# Patient Record
Sex: FEMALE | Race: WHITE | Employment: OTHER | URBAN - METROPOLITAN AREA
[De-identification: names, ages, dates, MRNs, and addresses within clinical notes are randomized per-mention and may not be internally consistent; named-entity substitution may affect disease eponyms.]

---

## 2018-05-30 ENCOUNTER — HOSPITAL ENCOUNTER (EMERGENCY)
Age: 63
Discharge: HOME | End: 2018-05-30
Attending: EMERGENCY MEDICINE
Payer: MEDICARE

## 2018-05-30 VITALS
SYSTOLIC BLOOD PRESSURE: 167 MMHG | DIASTOLIC BLOOD PRESSURE: 71 MMHG | OXYGEN SATURATION: 95 % | WEIGHT: 230 LBS | HEIGHT: 64 IN | TEMPERATURE: 99.1 F | BODY MASS INDEX: 39.27 KG/M2 | HEART RATE: 119 BPM

## 2018-05-30 DIAGNOSIS — F31.9 BIPOLAR 1 DISORDER (HCC): Primary | ICD-10-CM

## 2018-05-30 LAB
ALBUMIN SERPL-MCNC: 4.4 G/DL (ref 3.9–4.9)
ALP BLD-CCNC: 94 U/L (ref 40–130)
ALT SERPL-CCNC: 29 U/L (ref 0–33)
AMPHETAMINE SCREEN, URINE: NORMAL
ANION GAP SERPL CALCULATED.3IONS-SCNC: 16 MEQ/L (ref 7–13)
AST SERPL-CCNC: 26 U/L (ref 0–35)
BARBITURATE SCREEN URINE: NORMAL
BASOPHILS ABSOLUTE: 0.1 K/UL (ref 0–0.2)
BASOPHILS RELATIVE PERCENT: 0.6 %
BENZODIAZEPINE SCREEN, URINE: NORMAL
BILIRUB SERPL-MCNC: 0.4 MG/DL (ref 0–1.2)
BILIRUBIN URINE: NEGATIVE
BLOOD, URINE: NEGATIVE
BUN BLDV-MCNC: 4 MG/DL (ref 8–23)
CALCIUM SERPL-MCNC: 9 MG/DL (ref 8.6–10.2)
CANNABINOID SCREEN URINE: NORMAL
CHLORIDE BLD-SCNC: 95 MEQ/L (ref 98–107)
CLARITY: CLEAR
CO2: 24 MEQ/L (ref 22–29)
COCAINE METABOLITE SCREEN URINE: NORMAL
COLOR: YELLOW
CREAT SERPL-MCNC: 0.47 MG/DL (ref 0.5–0.9)
EOSINOPHILS ABSOLUTE: 0.1 K/UL (ref 0–0.7)
EOSINOPHILS RELATIVE PERCENT: 0.8 %
ETHANOL PERCENT: NORMAL G/DL
ETHANOL: <10 MG/DL (ref 0–0.08)
GFR AFRICAN AMERICAN: >60
GFR NON-AFRICAN AMERICAN: >60
GLOBULIN: 2.6 G/DL (ref 2.3–3.5)
GLUCOSE BLD-MCNC: 131 MG/DL (ref 74–109)
GLUCOSE URINE: NEGATIVE MG/DL
HCT VFR BLD CALC: 41.8 % (ref 37–47)
HEMOGLOBIN: 14.1 G/DL (ref 12–16)
KETONES, URINE: NEGATIVE MG/DL
LEUKOCYTE ESTERASE, URINE: NEGATIVE
LYMPHOCYTES ABSOLUTE: 3.3 K/UL (ref 1–4.8)
LYMPHOCYTES RELATIVE PERCENT: 27.1 %
Lab: NORMAL
MCH RBC QN AUTO: 29.2 PG (ref 27–31.3)
MCHC RBC AUTO-ENTMCNC: 33.8 % (ref 33–37)
MCV RBC AUTO: 86.5 FL (ref 82–100)
MONOCYTES ABSOLUTE: 0.7 K/UL (ref 0.2–0.8)
MONOCYTES RELATIVE PERCENT: 5.7 %
NEUTROPHILS ABSOLUTE: 8 K/UL (ref 1.4–6.5)
NEUTROPHILS RELATIVE PERCENT: 65.8 %
NITRITE, URINE: NEGATIVE
OPIATE SCREEN URINE: NORMAL
PDW BLD-RTO: 13.7 % (ref 11.5–14.5)
PH UA: 5.5 (ref 5–9)
PHENCYCLIDINE SCREEN URINE: NORMAL
PLATELET # BLD: 273 K/UL (ref 130–400)
POTASSIUM SERPL-SCNC: 3.7 MEQ/L (ref 3.5–5.1)
PROTEIN UA: NEGATIVE MG/DL
RBC # BLD: 4.84 M/UL (ref 4.2–5.4)
SODIUM BLD-SCNC: 135 MEQ/L (ref 132–144)
SPECIFIC GRAVITY UA: 1.01 (ref 1–1.03)
TOTAL CK: 128 U/L (ref 0–170)
TOTAL PROTEIN: 7 G/DL (ref 6.4–8.1)
TSH SERPL DL<=0.05 MIU/L-ACNC: 2.6 UIU/ML (ref 0.27–4.2)
URINE REFLEX TO CULTURE: NORMAL
UROBILINOGEN, URINE: 0.2 E.U./DL
WBC # BLD: 12.1 K/UL (ref 4.8–10.8)

## 2018-05-30 PROCEDURE — 80053 COMPREHEN METABOLIC PANEL: CPT

## 2018-05-30 PROCEDURE — 85025 COMPLETE CBC W/AUTO DIFF WBC: CPT

## 2018-05-30 PROCEDURE — 36415 COLL VENOUS BLD VENIPUNCTURE: CPT

## 2018-05-30 PROCEDURE — 6370000000 HC RX 637 (ALT 250 FOR IP): Performed by: EMERGENCY MEDICINE

## 2018-05-30 PROCEDURE — 99285 EMERGENCY DEPT VISIT HI MDM: CPT

## 2018-05-30 PROCEDURE — 81003 URINALYSIS AUTO W/O SCOPE: CPT

## 2018-05-30 PROCEDURE — 84443 ASSAY THYROID STIM HORMONE: CPT

## 2018-05-30 PROCEDURE — 82550 ASSAY OF CK (CPK): CPT

## 2018-05-30 PROCEDURE — G0480 DRUG TEST DEF 1-7 CLASSES: HCPCS

## 2018-05-30 PROCEDURE — 80307 DRUG TEST PRSMV CHEM ANLYZR: CPT

## 2018-05-30 RX ORDER — LORAZEPAM 1 MG/1
2 TABLET ORAL ONCE
Status: COMPLETED | OUTPATIENT
Start: 2018-05-30 | End: 2018-05-30

## 2018-05-30 RX ORDER — NICOTINE 21 MG/24HR
1 PATCH, TRANSDERMAL 24 HOURS TRANSDERMAL ONCE
Status: DISCONTINUED | OUTPATIENT
Start: 2018-05-30 | End: 2018-05-31 | Stop reason: HOSPADM

## 2018-05-30 RX ADMIN — LORAZEPAM 2 MG: 1 TABLET ORAL at 13:19

## 2018-05-30 ASSESSMENT — ENCOUNTER SYMPTOMS
RHINORRHEA: 0
PHOTOPHOBIA: 0
EYE DISCHARGE: 0
ABDOMINAL DISTENTION: 0
VOMITING: 0
SHORTNESS OF BREATH: 0
FACIAL SWELLING: 0
ABDOMINAL PAIN: 0
WHEEZING: 0
COLOR CHANGE: 0

## 2019-02-27 ENCOUNTER — HOSPITAL ENCOUNTER (INPATIENT)
Age: 64
LOS: 3 days | Discharge: HOME | DRG: 641 | End: 2019-03-03
Attending: EMERGENCY MEDICINE | Admitting: INTERNAL MEDICINE
Payer: MEDICARE

## 2019-02-27 DIAGNOSIS — R41.0 DELIRIUM: ICD-10-CM

## 2019-02-27 DIAGNOSIS — E87.1 HYPONATREMIA: Primary | ICD-10-CM

## 2019-02-27 LAB
ACETAMINOPHEN LEVEL: <5 UG/ML (ref 10–30)
ALBUMIN SERPL-MCNC: 4.5 G/DL (ref 3.5–4.6)
ALP BLD-CCNC: 108 U/L (ref 40–130)
ALT SERPL-CCNC: 27 U/L (ref 0–33)
AMPHETAMINE SCREEN, URINE: NORMAL
ANION GAP SERPL CALCULATED.3IONS-SCNC: 13 MEQ/L (ref 9–15)
ANION GAP SERPL CALCULATED.3IONS-SCNC: 14 MEQ/L (ref 9–15)
AST SERPL-CCNC: 23 U/L (ref 0–35)
BARBITURATE SCREEN URINE: NORMAL
BASOPHILS ABSOLUTE: 0.1 K/UL (ref 0–0.2)
BASOPHILS RELATIVE PERCENT: 0.8 %
BENZODIAZEPINE SCREEN, URINE: NORMAL
BILIRUB SERPL-MCNC: 0.6 MG/DL (ref 0.2–0.7)
BILIRUBIN URINE: NEGATIVE
BLOOD, URINE: NEGATIVE
BUN BLDV-MCNC: 10 MG/DL (ref 8–23)
BUN BLDV-MCNC: 8 MG/DL (ref 8–23)
CALCIUM SERPL-MCNC: 8.6 MG/DL (ref 8.5–9.9)
CALCIUM SERPL-MCNC: 9.7 MG/DL (ref 8.5–9.9)
CANNABINOID SCREEN URINE: NORMAL
CHLORIDE BLD-SCNC: 86 MEQ/L (ref 95–107)
CHLORIDE BLD-SCNC: 91 MEQ/L (ref 95–107)
CLARITY: CLEAR
CO2: 22 MEQ/L (ref 20–31)
CO2: 25 MEQ/L (ref 20–31)
COCAINE METABOLITE SCREEN URINE: NORMAL
COLOR: YELLOW
CREAT SERPL-MCNC: 0.66 MG/DL (ref 0.5–0.9)
CREAT SERPL-MCNC: 0.77 MG/DL (ref 0.5–0.9)
EOSINOPHILS ABSOLUTE: 0.1 K/UL (ref 0–0.7)
EOSINOPHILS RELATIVE PERCENT: 0.5 %
ETHANOL PERCENT: NORMAL G/DL
ETHANOL: <10 MG/DL (ref 0–0.08)
GFR AFRICAN AMERICAN: >60
GFR AFRICAN AMERICAN: >60
GFR NON-AFRICAN AMERICAN: >60
GFR NON-AFRICAN AMERICAN: >60
GLOBULIN: 3.3 G/DL (ref 2.3–3.5)
GLUCOSE BLD-MCNC: 139 MG/DL (ref 70–99)
GLUCOSE BLD-MCNC: 145 MG/DL (ref 70–99)
GLUCOSE URINE: NEGATIVE MG/DL
HCG(URINE) PREGNANCY TEST: NEGATIVE
HCT VFR BLD CALC: 36 % (ref 37–47)
HEMOGLOBIN: 12.5 G/DL (ref 12–16)
KETONES, URINE: NEGATIVE MG/DL
LEUKOCYTE ESTERASE, URINE: NEGATIVE
LYMPHOCYTES ABSOLUTE: 3.7 K/UL (ref 1–4.8)
LYMPHOCYTES RELATIVE PERCENT: 26.6 %
Lab: NORMAL
MCH RBC QN AUTO: 29.4 PG (ref 27–31.3)
MCHC RBC AUTO-ENTMCNC: 34.8 % (ref 33–37)
MCV RBC AUTO: 84.3 FL (ref 82–100)
MONOCYTES ABSOLUTE: 0.9 K/UL (ref 0.2–0.8)
MONOCYTES RELATIVE PERCENT: 6.6 %
NEUTROPHILS ABSOLUTE: 9.2 K/UL (ref 1.4–6.5)
NEUTROPHILS RELATIVE PERCENT: 65.5 %
NITRITE, URINE: NEGATIVE
OPIATE SCREEN URINE: NORMAL
PDW BLD-RTO: 13.7 % (ref 11.5–14.5)
PH UA: 5 (ref 5–9)
PHENCYCLIDINE SCREEN URINE: NORMAL
PLATELET # BLD: 336 K/UL (ref 130–400)
POTASSIUM SERPL-SCNC: 3.3 MEQ/L (ref 3.4–4.9)
POTASSIUM SERPL-SCNC: 3.6 MEQ/L (ref 3.4–4.9)
PROTEIN UA: NEGATIVE MG/DL
RBC # BLD: 4.27 M/UL (ref 4.2–5.4)
SALICYLATE, SERUM: <0.3 MG/DL (ref 15–30)
SODIUM BLD-SCNC: 125 MEQ/L (ref 135–144)
SODIUM BLD-SCNC: 126 MEQ/L (ref 135–144)
SPECIFIC GRAVITY UA: 1.01 (ref 1–1.03)
TOTAL CK: 134 U/L (ref 0–170)
TOTAL PROTEIN: 7.8 G/DL (ref 6.3–8)
TSH SERPL DL<=0.05 MIU/L-ACNC: 3.25 UIU/ML (ref 0.44–3.86)
URINE REFLEX TO CULTURE: NORMAL
UROBILINOGEN, URINE: 0.2 E.U./DL
WBC # BLD: 14 K/UL (ref 4.8–10.8)

## 2019-02-27 PROCEDURE — 81003 URINALYSIS AUTO W/O SCOPE: CPT

## 2019-02-27 PROCEDURE — G0480 DRUG TEST DEF 1-7 CLASSES: HCPCS

## 2019-02-27 PROCEDURE — 6360000002 HC RX W HCPCS: Performed by: PHYSICIAN ASSISTANT

## 2019-02-27 PROCEDURE — 96374 THER/PROPH/DIAG INJ IV PUSH: CPT

## 2019-02-27 PROCEDURE — 36415 COLL VENOUS BLD VENIPUNCTURE: CPT

## 2019-02-27 PROCEDURE — 2580000003 HC RX 258: Performed by: EMERGENCY MEDICINE

## 2019-02-27 PROCEDURE — 82550 ASSAY OF CK (CPK): CPT

## 2019-02-27 PROCEDURE — 99285 EMERGENCY DEPT VISIT HI MDM: CPT

## 2019-02-27 PROCEDURE — 2580000003 HC RX 258: Performed by: PHYSICIAN ASSISTANT

## 2019-02-27 PROCEDURE — 85025 COMPLETE CBC W/AUTO DIFF WBC: CPT

## 2019-02-27 PROCEDURE — 84703 CHORIONIC GONADOTROPIN ASSAY: CPT

## 2019-02-27 PROCEDURE — 84443 ASSAY THYROID STIM HORMONE: CPT

## 2019-02-27 PROCEDURE — 80053 COMPREHEN METABOLIC PANEL: CPT

## 2019-02-27 PROCEDURE — 80307 DRUG TEST PRSMV CHEM ANLYZR: CPT

## 2019-02-27 RX ORDER — SODIUM CHLORIDE 0.9 % (FLUSH) 0.9 %
3 SYRINGE (ML) INJECTION EVERY 8 HOURS
Status: DISCONTINUED | OUTPATIENT
Start: 2019-02-27 | End: 2019-02-28 | Stop reason: ALTCHOICE

## 2019-02-27 RX ORDER — OXCARBAZEPINE 300 MG/1
300 TABLET, FILM COATED ORAL 2 TIMES DAILY
Status: ON HOLD | COMMUNITY
End: 2019-03-07 | Stop reason: HOSPADM

## 2019-02-27 RX ORDER — LORAZEPAM 2 MG/ML
2 INJECTION INTRAMUSCULAR ONCE
Status: COMPLETED | OUTPATIENT
Start: 2019-02-27 | End: 2019-02-27

## 2019-02-27 RX ORDER — TRAZODONE HYDROCHLORIDE 50 MG/1
50 TABLET ORAL NIGHTLY
Status: ON HOLD | COMMUNITY
End: 2019-03-07 | Stop reason: HOSPADM

## 2019-02-27 RX ORDER — 0.9 % SODIUM CHLORIDE 0.9 %
1000 INTRAVENOUS SOLUTION INTRAVENOUS ONCE
Status: DISCONTINUED | OUTPATIENT
Start: 2019-02-27 | End: 2019-02-28

## 2019-02-27 RX ORDER — ARIPIPRAZOLE 20 MG/1
20 TABLET ORAL NIGHTLY
Status: ON HOLD | COMMUNITY
End: 2019-03-07 | Stop reason: SDUPTHER

## 2019-02-27 RX ORDER — 0.9 % SODIUM CHLORIDE 0.9 %
1500 INTRAVENOUS SOLUTION INTRAVENOUS ONCE
Status: COMPLETED | OUTPATIENT
Start: 2019-02-27 | End: 2019-02-27

## 2019-02-27 RX ADMIN — SODIUM CHLORIDE 1500 ML: 9 INJECTION, SOLUTION INTRAVENOUS at 20:31

## 2019-02-27 RX ADMIN — LORAZEPAM 2 MG: 2 INJECTION INTRAMUSCULAR; INTRAVENOUS at 22:26

## 2019-02-27 RX ADMIN — SODIUM CHLORIDE 1000 ML: 9 INJECTION, SOLUTION INTRAVENOUS at 23:48

## 2019-02-27 RX ADMIN — Medication 3 ML: at 20:33

## 2019-02-27 ASSESSMENT — ENCOUNTER SYMPTOMS
COUGH: 0
VOMITING: 0
SORE THROAT: 0
DIARRHEA: 0
SHORTNESS OF BREATH: 0
BACK PAIN: 0
NAUSEA: 0
ABDOMINAL PAIN: 0

## 2019-02-28 PROBLEM — E87.1 HYPONATREMIA: Status: ACTIVE | Noted: 2019-02-28

## 2019-02-28 LAB
ANION GAP SERPL CALCULATED.3IONS-SCNC: 12 MEQ/L (ref 9–15)
ANION GAP SERPL CALCULATED.3IONS-SCNC: 14 MEQ/L (ref 9–15)
BUN BLDV-MCNC: 7 MG/DL (ref 8–23)
BUN BLDV-MCNC: 8 MG/DL (ref 8–23)
C DIFFICILE TOXIN, EIA: NORMAL
CALCIUM SERPL-MCNC: 8.7 MG/DL (ref 8.5–9.9)
CALCIUM SERPL-MCNC: 8.9 MG/DL (ref 8.5–9.9)
CHLORIDE BLD-SCNC: 94 MEQ/L (ref 95–107)
CHLORIDE BLD-SCNC: 99 MEQ/L (ref 95–107)
CO2: 23 MEQ/L (ref 20–31)
CO2: 24 MEQ/L (ref 20–31)
CREAT SERPL-MCNC: 0.51 MG/DL (ref 0.5–0.9)
CREAT SERPL-MCNC: 0.55 MG/DL (ref 0.5–0.9)
CREATININE URINE: 219.6 MG/DL
GFR AFRICAN AMERICAN: >60
GFR AFRICAN AMERICAN: >60
GFR NON-AFRICAN AMERICAN: >60
GFR NON-AFRICAN AMERICAN: >60
GLUCOSE BLD-MCNC: 110 MG/DL (ref 70–99)
GLUCOSE BLD-MCNC: 132 MG/DL (ref 70–99)
POTASSIUM REFLEX MAGNESIUM: 3.8 MEQ/L (ref 3.4–4.9)
POTASSIUM REFLEX MAGNESIUM: 4.1 MEQ/L (ref 3.4–4.9)
RAPID INFLUENZA  B AGN: NEGATIVE
RAPID INFLUENZA A AGN: NEGATIVE
SODIUM BLD-SCNC: 131 MEQ/L (ref 135–144)
SODIUM BLD-SCNC: 135 MEQ/L (ref 135–144)
SODIUM URINE: <20 MEQ/L
UREA NITROGEN, UR: 834 MG/DL

## 2019-02-28 PROCEDURE — 6360000002 HC RX W HCPCS: Performed by: NURSE PRACTITIONER

## 2019-02-28 PROCEDURE — 84540 ASSAY OF URINE/UREA-N: CPT

## 2019-02-28 PROCEDURE — 6370000000 HC RX 637 (ALT 250 FOR IP): Performed by: NURSE PRACTITIONER

## 2019-02-28 PROCEDURE — 82570 ASSAY OF URINE CREATININE: CPT

## 2019-02-28 PROCEDURE — 87324 CLOSTRIDIUM AG IA: CPT

## 2019-02-28 PROCEDURE — 6370000000 HC RX 637 (ALT 250 FOR IP): Performed by: INTERNAL MEDICINE

## 2019-02-28 PROCEDURE — 84300 ASSAY OF URINE SODIUM: CPT

## 2019-02-28 PROCEDURE — 83935 ASSAY OF URINE OSMOLALITY: CPT

## 2019-02-28 PROCEDURE — 87449 NOS EACH ORGANISM AG IA: CPT

## 2019-02-28 PROCEDURE — 2580000003 HC RX 258: Performed by: NURSE PRACTITIONER

## 2019-02-28 PROCEDURE — 36415 COLL VENOUS BLD VENIPUNCTURE: CPT

## 2019-02-28 PROCEDURE — 1210000000 HC MED SURG R&B

## 2019-02-28 PROCEDURE — 81003 URINALYSIS AUTO W/O SCOPE: CPT

## 2019-02-28 PROCEDURE — 80048 BASIC METABOLIC PNL TOTAL CA: CPT

## 2019-02-28 PROCEDURE — 99222 1ST HOSP IP/OBS MODERATE 55: CPT | Performed by: PSYCHIATRY & NEUROLOGY

## 2019-02-28 PROCEDURE — 87804 INFLUENZA ASSAY W/OPTIC: CPT

## 2019-02-28 RX ORDER — SODIUM CHLORIDE 0.9 % (FLUSH) 0.9 %
10 SYRINGE (ML) INJECTION PRN
Status: DISCONTINUED | OUTPATIENT
Start: 2019-02-28 | End: 2019-03-03 | Stop reason: HOSPADM

## 2019-02-28 RX ORDER — LOPERAMIDE HYDROCHLORIDE 2 MG/1
2 CAPSULE ORAL 4 TIMES DAILY PRN
Status: DISCONTINUED | OUTPATIENT
Start: 2019-02-28 | End: 2019-03-03 | Stop reason: HOSPADM

## 2019-02-28 RX ORDER — ONDANSETRON 2 MG/ML
4 INJECTION INTRAMUSCULAR; INTRAVENOUS EVERY 6 HOURS PRN
Status: DISCONTINUED | OUTPATIENT
Start: 2019-02-28 | End: 2019-03-03 | Stop reason: HOSPADM

## 2019-02-28 RX ORDER — OXCARBAZEPINE 300 MG/1
300 TABLET, FILM COATED ORAL 2 TIMES DAILY
Status: DISCONTINUED | OUTPATIENT
Start: 2019-02-28 | End: 2019-03-03 | Stop reason: HOSPADM

## 2019-02-28 RX ORDER — CETIRIZINE HYDROCHLORIDE 10 MG/1
10 TABLET ORAL DAILY
Status: DISCONTINUED | OUTPATIENT
Start: 2019-02-28 | End: 2019-03-03 | Stop reason: HOSPADM

## 2019-02-28 RX ORDER — ACETAMINOPHEN 325 MG/1
650 TABLET ORAL EVERY 4 HOURS PRN
Status: DISCONTINUED | OUTPATIENT
Start: 2019-02-28 | End: 2019-03-03 | Stop reason: HOSPADM

## 2019-02-28 RX ORDER — CITALOPRAM 20 MG/1
20 TABLET ORAL DAILY
Status: DISCONTINUED | OUTPATIENT
Start: 2019-02-28 | End: 2019-03-03 | Stop reason: HOSPADM

## 2019-02-28 RX ORDER — NICOTINE 21 MG/24HR
1 PATCH, TRANSDERMAL 24 HOURS TRANSDERMAL DAILY
Status: DISCONTINUED | OUTPATIENT
Start: 2019-02-28 | End: 2019-03-03 | Stop reason: HOSPADM

## 2019-02-28 RX ORDER — FLUTICASONE PROPIONATE 50 MCG
2 SPRAY, SUSPENSION (ML) NASAL DAILY
Status: DISCONTINUED | OUTPATIENT
Start: 2019-02-28 | End: 2019-03-03 | Stop reason: HOSPADM

## 2019-02-28 RX ORDER — ARIPIPRAZOLE 10 MG/1
20 TABLET ORAL NIGHTLY
Status: DISCONTINUED | OUTPATIENT
Start: 2019-02-28 | End: 2019-03-03 | Stop reason: HOSPADM

## 2019-02-28 RX ORDER — TRAZODONE HYDROCHLORIDE 50 MG/1
50 TABLET ORAL NIGHTLY
Status: DISCONTINUED | OUTPATIENT
Start: 2019-02-28 | End: 2019-03-03 | Stop reason: HOSPADM

## 2019-02-28 RX ORDER — SODIUM CHLORIDE 0.9 % (FLUSH) 0.9 %
10 SYRINGE (ML) INJECTION EVERY 12 HOURS SCHEDULED
Status: DISCONTINUED | OUTPATIENT
Start: 2019-02-28 | End: 2019-03-03 | Stop reason: HOSPADM

## 2019-02-28 RX ADMIN — ACETAMINOPHEN 650 MG: 325 TABLET ORAL at 15:20

## 2019-02-28 RX ADMIN — NICOTINE POLACRILEX 2 MG: 2 GUM, CHEWING BUCCAL at 15:07

## 2019-02-28 RX ADMIN — LOPERAMIDE HYDROCHLORIDE 2 MG: 2 CAPSULE ORAL at 11:45

## 2019-02-28 RX ADMIN — CITALOPRAM HYDROBROMIDE 20 MG: 20 TABLET ORAL at 15:07

## 2019-02-28 RX ADMIN — Medication 10 ML: at 09:49

## 2019-02-28 RX ADMIN — TRAZODONE HYDROCHLORIDE 50 MG: 50 TABLET ORAL at 20:49

## 2019-02-28 RX ADMIN — ENOXAPARIN SODIUM 40 MG: 40 INJECTION SUBCUTANEOUS at 08:38

## 2019-02-28 RX ADMIN — LOPERAMIDE HYDROCHLORIDE 2 MG: 2 CAPSULE ORAL at 09:43

## 2019-02-28 RX ADMIN — ACETAMINOPHEN 650 MG: 325 TABLET ORAL at 06:50

## 2019-02-28 RX ADMIN — NICOTINE POLACRILEX 2 MG: 2 GUM, CHEWING BUCCAL at 17:40

## 2019-02-28 RX ADMIN — LOPERAMIDE HYDROCHLORIDE 2 MG: 2 CAPSULE ORAL at 18:58

## 2019-02-28 RX ADMIN — CETIRIZINE HYDROCHLORIDE 10 MG: 10 TABLET, FILM COATED ORAL at 16:09

## 2019-02-28 RX ADMIN — ONDANSETRON 4 MG: 2 INJECTION INTRAMUSCULAR; INTRAVENOUS at 14:22

## 2019-02-28 RX ADMIN — FLUTICASONE PROPIONATE 2 SPRAY: 50 SPRAY, METERED NASAL at 16:09

## 2019-02-28 RX ADMIN — ARIPIPRAZOLE 20 MG: 10 TABLET ORAL at 20:49

## 2019-02-28 RX ADMIN — ACETAMINOPHEN 650 MG: 325 TABLET ORAL at 09:17

## 2019-02-28 RX ADMIN — Medication 10 ML: at 20:50

## 2019-02-28 RX ADMIN — OXCARBAZEPINE 300 MG: 300 TABLET, FILM COATED ORAL at 20:49

## 2019-02-28 ASSESSMENT — PAIN DESCRIPTION - ORIENTATION: ORIENTATION: MID

## 2019-02-28 ASSESSMENT — PAIN SCALES - GENERAL
PAINLEVEL_OUTOF10: 5
PAINLEVEL_OUTOF10: 3
PAINLEVEL_OUTOF10: 5
PAINLEVEL_OUTOF10: 0

## 2019-02-28 ASSESSMENT — PAIN DESCRIPTION - PAIN TYPE: TYPE: CHRONIC PAIN

## 2019-02-28 ASSESSMENT — PAIN DESCRIPTION - DESCRIPTORS: DESCRIPTORS: ACHING

## 2019-02-28 ASSESSMENT — PAIN DESCRIPTION - FREQUENCY: FREQUENCY: INTERMITTENT

## 2019-02-28 ASSESSMENT — PAIN DESCRIPTION - LOCATION: LOCATION: BACK

## 2019-03-01 ENCOUNTER — APPOINTMENT (OUTPATIENT)
Dept: GENERAL RADIOLOGY | Age: 64
DRG: 641 | End: 2019-03-01
Payer: MEDICARE

## 2019-03-01 LAB
ALBUMIN SERPL-MCNC: 3.7 G/DL (ref 3.5–4.6)
ALP BLD-CCNC: 81 U/L (ref 40–130)
ALT SERPL-CCNC: 23 U/L (ref 0–33)
ANION GAP SERPL CALCULATED.3IONS-SCNC: 11 MEQ/L (ref 9–15)
AST SERPL-CCNC: 24 U/L (ref 0–35)
BACTERIA: ABNORMAL /HPF
BILIRUB SERPL-MCNC: <0.2 MG/DL (ref 0.2–0.7)
BILIRUBIN DIRECT: <0.2 MG/DL (ref 0–0.4)
BILIRUBIN URINE: NEGATIVE
BILIRUBIN, INDIRECT: NORMAL MG/DL (ref 0–0.6)
BLOOD, URINE: NEGATIVE
BUN BLDV-MCNC: 6 MG/DL (ref 8–23)
CALCIUM SERPL-MCNC: 8.7 MG/DL (ref 8.5–9.9)
CHLORIDE BLD-SCNC: 97 MEQ/L (ref 95–107)
CLARITY: ABNORMAL
CO2: 27 MEQ/L (ref 20–31)
COLOR: ABNORMAL
CREAT SERPL-MCNC: 0.54 MG/DL (ref 0.5–0.9)
EPITHELIAL CELLS, UA: ABNORMAL /HPF (ref 0–5)
GFR AFRICAN AMERICAN: >60
GFR NON-AFRICAN AMERICAN: >60
GLUCOSE BLD-MCNC: 124 MG/DL (ref 70–99)
GLUCOSE URINE: NEGATIVE MG/DL
HCT VFR BLD CALC: 31.6 % (ref 37–47)
HEMOGLOBIN: 10.8 G/DL (ref 12–16)
HYALINE CASTS: ABNORMAL /HPF (ref 0–5)
KETONES, URINE: ABNORMAL MG/DL
LEUKOCYTE ESTERASE, URINE: NEGATIVE
MCH RBC QN AUTO: 29.4 PG (ref 27–31.3)
MCHC RBC AUTO-ENTMCNC: 34 % (ref 33–37)
MCV RBC AUTO: 86.5 FL (ref 82–100)
NITRITE, URINE: NEGATIVE
OSMOLALITY URINE: 596 MOSM/KG (ref 300–900)
PDW BLD-RTO: 13.7 % (ref 11.5–14.5)
PH UA: 5.5 (ref 5–9)
PLATELET # BLD: 269 K/UL (ref 130–400)
POTASSIUM REFLEX MAGNESIUM: 4.4 MEQ/L (ref 3.4–4.9)
PROCALCITONIN: 0.06 NG/ML (ref 0–0.15)
PROTEIN UA: 30 MG/DL
RBC # BLD: 3.66 M/UL (ref 4.2–5.4)
SODIUM BLD-SCNC: 135 MEQ/L (ref 135–144)
SPECIFIC GRAVITY UA: 1.02 (ref 1–1.03)
SPECIFIC GRAVITY UA: 1.04 (ref 1–1.03)
TOTAL PROTEIN: 6.3 G/DL (ref 6.3–8)
URINE REFLEX TO CULTURE: YES
UROBILINOGEN, URINE: 1 E.U./DL
WBC # BLD: 8.5 K/UL (ref 4.8–10.8)
WBC UA: ABNORMAL /HPF (ref 0–5)

## 2019-03-01 PROCEDURE — 87040 BLOOD CULTURE FOR BACTERIA: CPT

## 2019-03-01 PROCEDURE — 6370000000 HC RX 637 (ALT 250 FOR IP): Performed by: NURSE PRACTITIONER

## 2019-03-01 PROCEDURE — 85027 COMPLETE CBC AUTOMATED: CPT

## 2019-03-01 PROCEDURE — 80076 HEPATIC FUNCTION PANEL: CPT

## 2019-03-01 PROCEDURE — 81001 URINALYSIS AUTO W/SCOPE: CPT

## 2019-03-01 PROCEDURE — 93010 ELECTROCARDIOGRAM REPORT: CPT | Performed by: INTERNAL MEDICINE

## 2019-03-01 PROCEDURE — 87077 CULTURE AEROBIC IDENTIFY: CPT

## 2019-03-01 PROCEDURE — 36415 COLL VENOUS BLD VENIPUNCTURE: CPT

## 2019-03-01 PROCEDURE — 93005 ELECTROCARDIOGRAM TRACING: CPT

## 2019-03-01 PROCEDURE — 87186 SC STD MICRODIL/AGAR DIL: CPT

## 2019-03-01 PROCEDURE — 87086 URINE CULTURE/COLONY COUNT: CPT

## 2019-03-01 PROCEDURE — 71045 X-RAY EXAM CHEST 1 VIEW: CPT

## 2019-03-01 PROCEDURE — 2580000003 HC RX 258: Performed by: NURSE PRACTITIONER

## 2019-03-01 PROCEDURE — 84145 PROCALCITONIN (PCT): CPT

## 2019-03-01 PROCEDURE — 6370000000 HC RX 637 (ALT 250 FOR IP): Performed by: INTERNAL MEDICINE

## 2019-03-01 PROCEDURE — 6360000002 HC RX W HCPCS: Performed by: NURSE PRACTITIONER

## 2019-03-01 PROCEDURE — 1210000000 HC MED SURG R&B

## 2019-03-01 PROCEDURE — 80048 BASIC METABOLIC PNL TOTAL CA: CPT

## 2019-03-01 RX ORDER — AZITHROMYCIN 250 MG/1
250 TABLET, FILM COATED ORAL DAILY
Status: DISCONTINUED | OUTPATIENT
Start: 2019-03-02 | End: 2019-03-03

## 2019-03-01 RX ORDER — AZITHROMYCIN 500 MG/1
500 TABLET, FILM COATED ORAL ONCE
Status: COMPLETED | OUTPATIENT
Start: 2019-03-01 | End: 2019-03-01

## 2019-03-01 RX ADMIN — ARIPIPRAZOLE 20 MG: 10 TABLET ORAL at 20:39

## 2019-03-01 RX ADMIN — CITALOPRAM HYDROBROMIDE 20 MG: 20 TABLET ORAL at 09:11

## 2019-03-01 RX ADMIN — TRAZODONE HYDROCHLORIDE 50 MG: 50 TABLET ORAL at 20:39

## 2019-03-01 RX ADMIN — ACETAMINOPHEN 650 MG: 325 TABLET ORAL at 18:10

## 2019-03-01 RX ADMIN — Medication 10 ML: at 20:40

## 2019-03-01 RX ADMIN — ACETAMINOPHEN 650 MG: 325 TABLET ORAL at 05:22

## 2019-03-01 RX ADMIN — Medication 10 ML: at 10:24

## 2019-03-01 RX ADMIN — AZITHROMYCIN MONOHYDRATE 500 MG: 500 TABLET ORAL at 14:20

## 2019-03-01 RX ADMIN — CETIRIZINE HYDROCHLORIDE 10 MG: 10 TABLET, FILM COATED ORAL at 09:11

## 2019-03-01 RX ADMIN — FLUTICASONE PROPIONATE 2 SPRAY: 50 SPRAY, METERED NASAL at 09:11

## 2019-03-01 RX ADMIN — NICOTINE POLACRILEX 2 MG: 2 GUM, CHEWING BUCCAL at 14:24

## 2019-03-01 RX ADMIN — OXCARBAZEPINE 300 MG: 300 TABLET, FILM COATED ORAL at 09:11

## 2019-03-01 RX ADMIN — NICOTINE POLACRILEX 2 MG: 2 GUM, CHEWING BUCCAL at 20:39

## 2019-03-01 RX ADMIN — ACETAMINOPHEN 650 MG: 325 TABLET ORAL at 10:23

## 2019-03-01 RX ADMIN — ENOXAPARIN SODIUM 40 MG: 40 INJECTION SUBCUTANEOUS at 09:14

## 2019-03-01 RX ADMIN — OXCARBAZEPINE 300 MG: 300 TABLET, FILM COATED ORAL at 20:39

## 2019-03-01 ASSESSMENT — PAIN SCALES - GENERAL
PAINLEVEL_OUTOF10: 10
PAINLEVEL_OUTOF10: 8
PAINLEVEL_OUTOF10: 0

## 2019-03-02 LAB
ANION GAP SERPL CALCULATED.3IONS-SCNC: 12 MEQ/L (ref 9–15)
BASOPHILS ABSOLUTE: 0.1 K/UL (ref 0–0.2)
BASOPHILS RELATIVE PERCENT: 0.9 %
BUN BLDV-MCNC: 6 MG/DL (ref 8–23)
CALCIUM SERPL-MCNC: 8.1 MG/DL (ref 8.5–9.9)
CHLORIDE BLD-SCNC: 97 MEQ/L (ref 95–107)
CO2: 26 MEQ/L (ref 20–31)
CREAT SERPL-MCNC: 0.5 MG/DL (ref 0.5–0.9)
EOSINOPHILS ABSOLUTE: 0.1 K/UL (ref 0–0.7)
EOSINOPHILS RELATIVE PERCENT: 1.2 %
GFR AFRICAN AMERICAN: >60
GFR NON-AFRICAN AMERICAN: >60
GLUCOSE BLD-MCNC: 120 MG/DL (ref 70–99)
HCT VFR BLD CALC: 29.5 % (ref 37–47)
HEMOGLOBIN: 10 G/DL (ref 12–16)
LYMPHOCYTES ABSOLUTE: 3.1 K/UL (ref 1–4.8)
LYMPHOCYTES RELATIVE PERCENT: 37.7 %
MAGNESIUM: 1.6 MG/DL (ref 1.7–2.4)
MCH RBC QN AUTO: 29.1 PG (ref 27–31.3)
MCHC RBC AUTO-ENTMCNC: 33.8 % (ref 33–37)
MCV RBC AUTO: 86 FL (ref 82–100)
MONOCYTES ABSOLUTE: 0.6 K/UL (ref 0.2–0.8)
MONOCYTES RELATIVE PERCENT: 7.7 %
NEUTROPHILS ABSOLUTE: 4.4 K/UL (ref 1.4–6.5)
NEUTROPHILS RELATIVE PERCENT: 52.5 %
PDW BLD-RTO: 13.5 % (ref 11.5–14.5)
PLATELET # BLD: 267 K/UL (ref 130–400)
POTASSIUM REFLEX MAGNESIUM: 3.7 MEQ/L (ref 3.4–4.9)
RBC # BLD: 3.42 M/UL (ref 4.2–5.4)
SODIUM BLD-SCNC: 135 MEQ/L (ref 135–144)
WBC # BLD: 8.3 K/UL (ref 4.8–10.8)

## 2019-03-02 PROCEDURE — 6360000002 HC RX W HCPCS: Performed by: NURSE PRACTITIONER

## 2019-03-02 PROCEDURE — 1210000000 HC MED SURG R&B

## 2019-03-02 PROCEDURE — 83735 ASSAY OF MAGNESIUM: CPT

## 2019-03-02 PROCEDURE — 6370000000 HC RX 637 (ALT 250 FOR IP): Performed by: INTERNAL MEDICINE

## 2019-03-02 PROCEDURE — 99233 SBSQ HOSP IP/OBS HIGH 50: CPT | Performed by: INTERNAL MEDICINE

## 2019-03-02 PROCEDURE — 6370000000 HC RX 637 (ALT 250 FOR IP): Performed by: NURSE PRACTITIONER

## 2019-03-02 PROCEDURE — 36415 COLL VENOUS BLD VENIPUNCTURE: CPT

## 2019-03-02 PROCEDURE — 93005 ELECTROCARDIOGRAM TRACING: CPT

## 2019-03-02 PROCEDURE — 2580000003 HC RX 258: Performed by: NURSE PRACTITIONER

## 2019-03-02 PROCEDURE — 85025 COMPLETE CBC W/AUTO DIFF WBC: CPT

## 2019-03-02 PROCEDURE — 80048 BASIC METABOLIC PNL TOTAL CA: CPT

## 2019-03-02 PROCEDURE — 6360000002 HC RX W HCPCS: Performed by: INTERNAL MEDICINE

## 2019-03-02 RX ORDER — OXCARBAZEPINE 300 MG/1
300 TABLET, FILM COATED ORAL 2 TIMES DAILY
Status: CANCELLED | OUTPATIENT
Start: 2019-03-02

## 2019-03-02 RX ORDER — CETIRIZINE HYDROCHLORIDE 10 MG/1
10 TABLET ORAL DAILY
Status: CANCELLED | OUTPATIENT
Start: 2019-03-03

## 2019-03-02 RX ORDER — AZITHROMYCIN 250 MG/1
250 TABLET, FILM COATED ORAL DAILY
Status: CANCELLED | OUTPATIENT
Start: 2019-03-03 | End: 2019-03-06

## 2019-03-02 RX ORDER — ONDANSETRON 2 MG/ML
4 INJECTION INTRAMUSCULAR; INTRAVENOUS EVERY 6 HOURS PRN
Status: CANCELLED | OUTPATIENT
Start: 2019-03-02

## 2019-03-02 RX ORDER — ACETAMINOPHEN 325 MG/1
650 TABLET ORAL EVERY 4 HOURS PRN
Status: CANCELLED | OUTPATIENT
Start: 2019-03-02

## 2019-03-02 RX ORDER — LOPERAMIDE HYDROCHLORIDE 2 MG/1
2 CAPSULE ORAL 4 TIMES DAILY PRN
Status: CANCELLED | OUTPATIENT
Start: 2019-03-02

## 2019-03-02 RX ORDER — POTASSIUM CHLORIDE 20 MEQ/1
40 TABLET, EXTENDED RELEASE ORAL ONCE
Status: COMPLETED | OUTPATIENT
Start: 2019-03-02 | End: 2019-03-02

## 2019-03-02 RX ORDER — MAGNESIUM SULFATE IN WATER 40 MG/ML
4 INJECTION, SOLUTION INTRAVENOUS ONCE
Status: COMPLETED | OUTPATIENT
Start: 2019-03-02 | End: 2019-03-02

## 2019-03-02 RX ORDER — FLUTICASONE PROPIONATE 50 MCG
2 SPRAY, SUSPENSION (ML) NASAL DAILY
Status: CANCELLED | OUTPATIENT
Start: 2019-03-03

## 2019-03-02 RX ORDER — TRAZODONE HYDROCHLORIDE 50 MG/1
50 TABLET ORAL NIGHTLY
Status: CANCELLED | OUTPATIENT
Start: 2019-03-02

## 2019-03-02 RX ORDER — ARIPIPRAZOLE 10 MG/1
20 TABLET ORAL NIGHTLY
Status: CANCELLED | OUTPATIENT
Start: 2019-03-02

## 2019-03-02 RX ORDER — CITALOPRAM 20 MG/1
20 TABLET ORAL DAILY
Status: CANCELLED | OUTPATIENT
Start: 2019-03-03

## 2019-03-02 RX ADMIN — OXCARBAZEPINE 300 MG: 300 TABLET, FILM COATED ORAL at 21:25

## 2019-03-02 RX ADMIN — TRAZODONE HYDROCHLORIDE 50 MG: 50 TABLET ORAL at 21:25

## 2019-03-02 RX ADMIN — AZITHROMYCIN 250 MG: 250 TABLET, FILM COATED ORAL at 07:33

## 2019-03-02 RX ADMIN — OXCARBAZEPINE 300 MG: 300 TABLET, FILM COATED ORAL at 07:33

## 2019-03-02 RX ADMIN — Medication 10 ML: at 21:25

## 2019-03-02 RX ADMIN — ENOXAPARIN SODIUM 40 MG: 40 INJECTION SUBCUTANEOUS at 07:33

## 2019-03-02 RX ADMIN — NICOTINE POLACRILEX 2 MG: 2 GUM, CHEWING BUCCAL at 16:37

## 2019-03-02 RX ADMIN — CITALOPRAM HYDROBROMIDE 20 MG: 20 TABLET ORAL at 07:33

## 2019-03-02 RX ADMIN — MAGNESIUM SULFATE HEPTAHYDRATE 4 G: 40 INJECTION, SOLUTION INTRAVENOUS at 15:03

## 2019-03-02 RX ADMIN — ARIPIPRAZOLE 20 MG: 10 TABLET ORAL at 21:24

## 2019-03-02 RX ADMIN — NICOTINE POLACRILEX 2 MG: 2 GUM, CHEWING BUCCAL at 00:15

## 2019-03-02 RX ADMIN — METOPROLOL TARTRATE 25 MG: 25 TABLET ORAL at 21:25

## 2019-03-02 RX ADMIN — Medication 10 ML: at 07:34

## 2019-03-02 RX ADMIN — MELATONIN TAB 3 MG 5 MG: 3 TAB at 23:51

## 2019-03-02 RX ADMIN — POTASSIUM CHLORIDE 40 MEQ: 20 TABLET, EXTENDED RELEASE ORAL at 11:09

## 2019-03-02 RX ADMIN — CETIRIZINE HYDROCHLORIDE 10 MG: 10 TABLET, FILM COATED ORAL at 07:33

## 2019-03-02 RX ADMIN — FLUTICASONE PROPIONATE 2 SPRAY: 50 SPRAY, METERED NASAL at 07:25

## 2019-03-02 ASSESSMENT — PAIN SCALES - GENERAL
PAINLEVEL_OUTOF10: 0

## 2019-03-03 ENCOUNTER — HOSPITAL ENCOUNTER (INPATIENT)
Age: 64
LOS: 4 days | Discharge: HOME | DRG: 885 | End: 2019-03-07
Attending: PSYCHIATRY & NEUROLOGY | Admitting: PSYCHIATRY & NEUROLOGY
Payer: MEDICARE

## 2019-03-03 VITALS
HEART RATE: 91 BPM | SYSTOLIC BLOOD PRESSURE: 155 MMHG | WEIGHT: 230 LBS | BODY MASS INDEX: 39.27 KG/M2 | TEMPERATURE: 97.9 F | RESPIRATION RATE: 18 BRPM | HEIGHT: 64 IN | OXYGEN SATURATION: 98 % | DIASTOLIC BLOOD PRESSURE: 68 MMHG

## 2019-03-03 PROBLEM — F31.9 BIPOLAR DISORDER (HCC): Status: ACTIVE | Noted: 2019-03-03

## 2019-03-03 LAB
ANION GAP SERPL CALCULATED.3IONS-SCNC: 12 MEQ/L (ref 9–15)
BUN BLDV-MCNC: 6 MG/DL (ref 8–23)
CALCIUM SERPL-MCNC: 8.8 MG/DL (ref 8.5–9.9)
CHLORIDE BLD-SCNC: 93 MEQ/L (ref 95–107)
CO2: 26 MEQ/L (ref 20–31)
CREAT SERPL-MCNC: 0.49 MG/DL (ref 0.5–0.9)
EKG ATRIAL RATE: 129 BPM
EKG ATRIAL RATE: 72 BPM
EKG ATRIAL RATE: 81 BPM
EKG P AXIS: 49 DEGREES
EKG P AXIS: 55 DEGREES
EKG P AXIS: 58 DEGREES
EKG P-R INTERVAL: 134 MS
EKG P-R INTERVAL: 150 MS
EKG P-R INTERVAL: 176 MS
EKG Q-T INTERVAL: 298 MS
EKG Q-T INTERVAL: 390 MS
EKG Q-T INTERVAL: 412 MS
EKG QRS DURATION: 82 MS
EKG QRS DURATION: 88 MS
EKG QRS DURATION: 88 MS
EKG QTC CALCULATION (BAZETT): 436 MS
EKG QTC CALCULATION (BAZETT): 451 MS
EKG QTC CALCULATION (BAZETT): 453 MS
EKG R AXIS: -10 DEGREES
EKG R AXIS: 17 DEGREES
EKG R AXIS: 8 DEGREES
EKG T AXIS: 35 DEGREES
EKG T AXIS: 49 DEGREES
EKG T AXIS: 68 DEGREES
EKG VENTRICULAR RATE: 129 BPM
EKG VENTRICULAR RATE: 72 BPM
EKG VENTRICULAR RATE: 81 BPM
GFR AFRICAN AMERICAN: >60
GFR NON-AFRICAN AMERICAN: >60
GLUCOSE BLD-MCNC: 132 MG/DL (ref 70–99)
LV EF: 60 %
LVEF MODALITY: NORMAL
MAGNESIUM: 2 MG/DL (ref 1.7–2.4)
POTASSIUM REFLEX MAGNESIUM: 4.1 MEQ/L (ref 3.4–4.9)
SODIUM BLD-SCNC: 131 MEQ/L (ref 135–144)

## 2019-03-03 PROCEDURE — 2580000003 HC RX 258: Performed by: NURSE PRACTITIONER

## 2019-03-03 PROCEDURE — 93306 TTE W/DOPPLER COMPLETE: CPT

## 2019-03-03 PROCEDURE — 83735 ASSAY OF MAGNESIUM: CPT

## 2019-03-03 PROCEDURE — 6370000000 HC RX 637 (ALT 250 FOR IP): Performed by: INTERNAL MEDICINE

## 2019-03-03 PROCEDURE — 6360000002 HC RX W HCPCS: Performed by: NURSE PRACTITIONER

## 2019-03-03 PROCEDURE — 6370000000 HC RX 637 (ALT 250 FOR IP): Performed by: NURSE PRACTITIONER

## 2019-03-03 PROCEDURE — 1240000000 HC EMOTIONAL WELLNESS R&B

## 2019-03-03 PROCEDURE — 93005 ELECTROCARDIOGRAM TRACING: CPT

## 2019-03-03 PROCEDURE — 36415 COLL VENOUS BLD VENIPUNCTURE: CPT

## 2019-03-03 PROCEDURE — 6370000000 HC RX 637 (ALT 250 FOR IP): Performed by: PSYCHIATRY & NEUROLOGY

## 2019-03-03 PROCEDURE — 93000 ELECTROCARDIOGRAM COMPLETE: CPT | Performed by: NURSE PRACTITIONER

## 2019-03-03 PROCEDURE — 80048 BASIC METABOLIC PNL TOTAL CA: CPT

## 2019-03-03 RX ORDER — SULFAMETHOXAZOLE AND TRIMETHOPRIM 800; 160 MG/1; MG/1
1 TABLET ORAL EVERY 12 HOURS SCHEDULED
Status: DISCONTINUED | OUTPATIENT
Start: 2019-03-03 | End: 2019-03-03 | Stop reason: HOSPADM

## 2019-03-03 RX ORDER — SULFAMETHOXAZOLE AND TRIMETHOPRIM 800; 160 MG/1; MG/1
1 TABLET ORAL EVERY 12 HOURS SCHEDULED
Status: COMPLETED | OUTPATIENT
Start: 2019-03-03 | End: 2019-03-06

## 2019-03-03 RX ORDER — ARIPIPRAZOLE 20 MG/1
20 TABLET ORAL NIGHTLY
Status: DISCONTINUED | OUTPATIENT
Start: 2019-03-03 | End: 2019-03-07 | Stop reason: HOSPADM

## 2019-03-03 RX ORDER — TRAZODONE HYDROCHLORIDE 50 MG/1
50 TABLET ORAL NIGHTLY
Status: DISCONTINUED | OUTPATIENT
Start: 2019-03-03 | End: 2019-03-07 | Stop reason: HOSPADM

## 2019-03-03 RX ORDER — CITALOPRAM 20 MG/1
20 TABLET ORAL DAILY
Status: DISCONTINUED | OUTPATIENT
Start: 2019-03-04 | End: 2019-03-06

## 2019-03-03 RX ORDER — CETIRIZINE HYDROCHLORIDE 10 MG/1
10 TABLET ORAL DAILY
Status: DISCONTINUED | OUTPATIENT
Start: 2019-03-04 | End: 2019-03-07 | Stop reason: HOSPADM

## 2019-03-03 RX ORDER — OXCARBAZEPINE 300 MG/1
300 TABLET, FILM COATED ORAL 2 TIMES DAILY
Status: DISCONTINUED | OUTPATIENT
Start: 2019-03-03 | End: 2019-03-04

## 2019-03-03 RX ORDER — SULFAMETHOXAZOLE AND TRIMETHOPRIM 800; 160 MG/1; MG/1
1 TABLET ORAL EVERY 12 HOURS SCHEDULED
Status: CANCELLED | OUTPATIENT
Start: 2019-03-03 | End: 2019-03-06

## 2019-03-03 RX ORDER — ONDANSETRON 2 MG/ML
4 INJECTION INTRAMUSCULAR; INTRAVENOUS EVERY 6 HOURS PRN
Status: DISCONTINUED | OUTPATIENT
Start: 2019-03-03 | End: 2019-03-07 | Stop reason: HOSPADM

## 2019-03-03 RX ORDER — LOPERAMIDE HYDROCHLORIDE 2 MG/1
2 CAPSULE ORAL 4 TIMES DAILY PRN
Status: DISCONTINUED | OUTPATIENT
Start: 2019-03-03 | End: 2019-03-07 | Stop reason: HOSPADM

## 2019-03-03 RX ORDER — ACETAMINOPHEN 325 MG/1
650 TABLET ORAL EVERY 4 HOURS PRN
Status: DISCONTINUED | OUTPATIENT
Start: 2019-03-03 | End: 2019-03-07 | Stop reason: HOSPADM

## 2019-03-03 RX ORDER — FLUTICASONE PROPIONATE 50 MCG
2 SPRAY, SUSPENSION (ML) NASAL DAILY
Status: DISCONTINUED | OUTPATIENT
Start: 2019-03-04 | End: 2019-03-07 | Stop reason: HOSPADM

## 2019-03-03 RX ADMIN — OXCARBAZEPINE 300 MG: 300 TABLET, FILM COATED ORAL at 08:00

## 2019-03-03 RX ADMIN — ARIPIPRAZOLE 20 MG: 20 TABLET ORAL at 20:33

## 2019-03-03 RX ADMIN — METOPROLOL TARTRATE 25 MG: 25 TABLET ORAL at 20:33

## 2019-03-03 RX ADMIN — ACETAMINOPHEN 650 MG: 325 TABLET ORAL at 18:41

## 2019-03-03 RX ADMIN — CETIRIZINE HYDROCHLORIDE 10 MG: 10 TABLET, FILM COATED ORAL at 08:01

## 2019-03-03 RX ADMIN — NICOTINE POLACRILEX 2 MG: 2 GUM, CHEWING BUCCAL at 14:38

## 2019-03-03 RX ADMIN — FLUTICASONE PROPIONATE 2 SPRAY: 50 SPRAY, METERED NASAL at 10:15

## 2019-03-03 RX ADMIN — Medication 10 ML: at 10:16

## 2019-03-03 RX ADMIN — SULFAMETHOXAZOLE AND TRIMETHOPRIM 1 TABLET: 800; 160 TABLET ORAL at 20:33

## 2019-03-03 RX ADMIN — METOPROLOL TARTRATE 25 MG: 25 TABLET ORAL at 08:00

## 2019-03-03 RX ADMIN — NICOTINE POLACRILEX 2 MG: 2 GUM, CHEWING BUCCAL at 10:16

## 2019-03-03 RX ADMIN — ACETAMINOPHEN 650 MG: 325 TABLET ORAL at 05:28

## 2019-03-03 RX ADMIN — NICOTINE POLACRILEX 4 MG: 4 GUM, CHEWING BUCCAL at 18:16

## 2019-03-03 RX ADMIN — AZITHROMYCIN 250 MG: 250 TABLET, FILM COATED ORAL at 08:00

## 2019-03-03 RX ADMIN — OXCARBAZEPINE 300 MG: 300 TABLET, FILM COATED ORAL at 20:33

## 2019-03-03 RX ADMIN — TRAZODONE HYDROCHLORIDE 50 MG: 50 TABLET ORAL at 20:33

## 2019-03-03 RX ADMIN — ENOXAPARIN SODIUM 40 MG: 40 INJECTION SUBCUTANEOUS at 08:00

## 2019-03-03 RX ADMIN — CITALOPRAM HYDROBROMIDE 20 MG: 20 TABLET ORAL at 08:01

## 2019-03-03 ASSESSMENT — SLEEP AND FATIGUE QUESTIONNAIRES
AVERAGE NUMBER OF SLEEP HOURS: 3
RESTFUL SLEEP: NO
DIFFICULTY FALLING ASLEEP: YES
DO YOU HAVE DIFFICULTY SLEEPING: YES
DO YOU USE A SLEEP AID: COMMENT
SLEEP PATTERN: INSOMNIA
DIFFICULTY STAYING ASLEEP: YES
DIFFICULTY ARISING: YES

## 2019-03-03 ASSESSMENT — PAIN SCALES - GENERAL
PAINLEVEL_OUTOF10: 0
PAINLEVEL_OUTOF10: 2
PAINLEVEL_OUTOF10: 6

## 2019-03-04 PROBLEM — F31.10 BIPOLAR AFFECTIVE DISORDER, CURRENT EPISODE MANIC (HCC): Status: ACTIVE | Noted: 2019-03-03

## 2019-03-04 LAB
ORGANISM: ABNORMAL
URINE CULTURE, ROUTINE: ABNORMAL
URINE CULTURE, ROUTINE: ABNORMAL

## 2019-03-04 PROCEDURE — 6370000000 HC RX 637 (ALT 250 FOR IP): Performed by: PSYCHIATRY & NEUROLOGY

## 2019-03-04 PROCEDURE — 99222 1ST HOSP IP/OBS MODERATE 55: CPT | Performed by: PSYCHIATRY & NEUROLOGY

## 2019-03-04 PROCEDURE — 1240000000 HC EMOTIONAL WELLNESS R&B

## 2019-03-04 PROCEDURE — 6370000000 HC RX 637 (ALT 250 FOR IP): Performed by: INTERNAL MEDICINE

## 2019-03-04 PROCEDURE — 93010 ELECTROCARDIOGRAM REPORT: CPT | Performed by: INTERNAL MEDICINE

## 2019-03-04 RX ORDER — HYDROXYZINE PAMOATE 25 MG/1
25 CAPSULE ORAL EVERY 6 HOURS PRN
Status: DISCONTINUED | OUTPATIENT
Start: 2019-03-04 | End: 2019-03-07 | Stop reason: HOSPADM

## 2019-03-04 RX ADMIN — HYDROXYZINE PAMOATE 25 MG: 25 CAPSULE ORAL at 22:38

## 2019-03-04 RX ADMIN — METOPROLOL TARTRATE 25 MG: 25 TABLET ORAL at 21:04

## 2019-03-04 RX ADMIN — OXCARBAZEPINE 300 MG: 300 TABLET, FILM COATED ORAL at 08:55

## 2019-03-04 RX ADMIN — ACETAMINOPHEN 650 MG: 325 TABLET ORAL at 12:13

## 2019-03-04 RX ADMIN — LOPERAMIDE HYDROCHLORIDE 2 MG: 2 CAPSULE ORAL at 10:40

## 2019-03-04 RX ADMIN — METOPROLOL TARTRATE 25 MG: 25 TABLET ORAL at 08:55

## 2019-03-04 RX ADMIN — TRAZODONE HYDROCHLORIDE 50 MG: 50 TABLET ORAL at 21:04

## 2019-03-04 RX ADMIN — ARIPIPRAZOLE 20 MG: 20 TABLET ORAL at 21:04

## 2019-03-04 RX ADMIN — OXCARBAZEPINE 450 MG: 300 TABLET, FILM COATED ORAL at 21:04

## 2019-03-04 RX ADMIN — SULFAMETHOXAZOLE AND TRIMETHOPRIM 1 TABLET: 800; 160 TABLET ORAL at 21:04

## 2019-03-04 RX ADMIN — SULFAMETHOXAZOLE AND TRIMETHOPRIM 1 TABLET: 800; 160 TABLET ORAL at 08:55

## 2019-03-04 RX ADMIN — FLUTICASONE PROPIONATE 2 SPRAY: 50 SPRAY, METERED NASAL at 08:56

## 2019-03-04 RX ADMIN — CITALOPRAM HYDROBROMIDE 20 MG: 20 TABLET ORAL at 08:56

## 2019-03-04 RX ADMIN — NICOTINE POLACRILEX 4 MG: 4 GUM, CHEWING BUCCAL at 18:15

## 2019-03-04 RX ADMIN — CETIRIZINE HYDROCHLORIDE 10 MG: 10 TABLET, FILM COATED ORAL at 09:18

## 2019-03-05 LAB
ANION GAP SERPL CALCULATED.3IONS-SCNC: 11 MEQ/L (ref 9–15)
BUN BLDV-MCNC: 5 MG/DL (ref 8–23)
CALCIUM SERPL-MCNC: 9.3 MG/DL (ref 8.5–9.9)
CHLORIDE BLD-SCNC: 96 MEQ/L (ref 95–107)
CO2: 28 MEQ/L (ref 20–31)
CREAT SERPL-MCNC: 0.59 MG/DL (ref 0.5–0.9)
GFR AFRICAN AMERICAN: >60
GFR NON-AFRICAN AMERICAN: >60
GLUCOSE BLD-MCNC: 121 MG/DL (ref 70–99)
POTASSIUM SERPL-SCNC: 4.8 MEQ/L (ref 3.4–4.9)
SODIUM BLD-SCNC: 135 MEQ/L (ref 135–144)

## 2019-03-05 PROCEDURE — 6370000000 HC RX 637 (ALT 250 FOR IP): Performed by: PSYCHIATRY & NEUROLOGY

## 2019-03-05 PROCEDURE — 36415 COLL VENOUS BLD VENIPUNCTURE: CPT

## 2019-03-05 PROCEDURE — 99232 SBSQ HOSP IP/OBS MODERATE 35: CPT | Performed by: PSYCHIATRY & NEUROLOGY

## 2019-03-05 PROCEDURE — 6370000000 HC RX 637 (ALT 250 FOR IP): Performed by: INTERNAL MEDICINE

## 2019-03-05 PROCEDURE — 80048 BASIC METABOLIC PNL TOTAL CA: CPT

## 2019-03-05 PROCEDURE — 1240000000 HC EMOTIONAL WELLNESS R&B

## 2019-03-05 RX ADMIN — METOPROLOL TARTRATE 25 MG: 25 TABLET ORAL at 21:30

## 2019-03-05 RX ADMIN — OXCARBAZEPINE 450 MG: 300 TABLET, FILM COATED ORAL at 21:29

## 2019-03-05 RX ADMIN — METOPROLOL TARTRATE 25 MG: 25 TABLET ORAL at 09:08

## 2019-03-05 RX ADMIN — OXCARBAZEPINE 450 MG: 300 TABLET, FILM COATED ORAL at 09:07

## 2019-03-05 RX ADMIN — SULFAMETHOXAZOLE AND TRIMETHOPRIM 1 TABLET: 800; 160 TABLET ORAL at 09:08

## 2019-03-05 RX ADMIN — NICOTINE POLACRILEX 4 MG: 4 GUM, CHEWING BUCCAL at 09:43

## 2019-03-05 RX ADMIN — FLUTICASONE PROPIONATE 2 SPRAY: 50 SPRAY, METERED NASAL at 09:11

## 2019-03-05 RX ADMIN — CITALOPRAM HYDROBROMIDE 20 MG: 20 TABLET ORAL at 09:08

## 2019-03-05 RX ADMIN — NICOTINE POLACRILEX 4 MG: 4 GUM, CHEWING BUCCAL at 16:38

## 2019-03-05 RX ADMIN — CETIRIZINE HYDROCHLORIDE 10 MG: 10 TABLET, FILM COATED ORAL at 11:01

## 2019-03-05 RX ADMIN — TRAZODONE HYDROCHLORIDE 50 MG: 50 TABLET ORAL at 21:30

## 2019-03-05 RX ADMIN — ARIPIPRAZOLE 20 MG: 20 TABLET ORAL at 21:29

## 2019-03-05 RX ADMIN — HYDROXYZINE PAMOATE 25 MG: 25 CAPSULE ORAL at 17:43

## 2019-03-05 RX ADMIN — SULFAMETHOXAZOLE AND TRIMETHOPRIM 1 TABLET: 800; 160 TABLET ORAL at 21:29

## 2019-03-05 RX ADMIN — ACETAMINOPHEN 650 MG: 325 TABLET ORAL at 19:44

## 2019-03-05 ASSESSMENT — LIFESTYLE VARIABLES: HISTORY_ALCOHOL_USE: NO

## 2019-03-05 ASSESSMENT — PAIN SCALES - GENERAL: PAINLEVEL_OUTOF10: 8

## 2019-03-06 LAB
BLOOD CULTURE, ROUTINE: NORMAL
CULTURE, BLOOD 2: NORMAL

## 2019-03-06 PROCEDURE — 6370000000 HC RX 637 (ALT 250 FOR IP): Performed by: INTERNAL MEDICINE

## 2019-03-06 PROCEDURE — 6370000000 HC RX 637 (ALT 250 FOR IP): Performed by: PSYCHIATRY & NEUROLOGY

## 2019-03-06 PROCEDURE — 1240000000 HC EMOTIONAL WELLNESS R&B

## 2019-03-06 PROCEDURE — 99232 SBSQ HOSP IP/OBS MODERATE 35: CPT | Performed by: PSYCHIATRY & NEUROLOGY

## 2019-03-06 RX ORDER — CITALOPRAM 10 MG/1
10 TABLET ORAL DAILY
Status: DISCONTINUED | OUTPATIENT
Start: 2019-03-07 | End: 2019-03-07 | Stop reason: HOSPADM

## 2019-03-06 RX ADMIN — CETIRIZINE HYDROCHLORIDE 10 MG: 10 TABLET, FILM COATED ORAL at 09:31

## 2019-03-06 RX ADMIN — CITALOPRAM HYDROBROMIDE 20 MG: 20 TABLET ORAL at 09:31

## 2019-03-06 RX ADMIN — HYDROXYZINE PAMOATE 25 MG: 25 CAPSULE ORAL at 22:36

## 2019-03-06 RX ADMIN — FLUTICASONE PROPIONATE 2 SPRAY: 50 SPRAY, METERED NASAL at 09:35

## 2019-03-06 RX ADMIN — ARIPIPRAZOLE 20 MG: 20 TABLET ORAL at 21:20

## 2019-03-06 RX ADMIN — TRAZODONE HYDROCHLORIDE 50 MG: 50 TABLET ORAL at 21:21

## 2019-03-06 RX ADMIN — METOPROLOL TARTRATE 25 MG: 25 TABLET ORAL at 09:31

## 2019-03-06 RX ADMIN — HYDROXYZINE PAMOATE 25 MG: 25 CAPSULE ORAL at 00:15

## 2019-03-06 RX ADMIN — METOPROLOL TARTRATE 25 MG: 25 TABLET ORAL at 21:20

## 2019-03-06 RX ADMIN — OXCARBAZEPINE 450 MG: 300 TABLET, FILM COATED ORAL at 09:31

## 2019-03-06 RX ADMIN — ACETAMINOPHEN 650 MG: 325 TABLET ORAL at 00:14

## 2019-03-06 RX ADMIN — OXCARBAZEPINE 450 MG: 300 TABLET, FILM COATED ORAL at 21:20

## 2019-03-06 RX ADMIN — SULFAMETHOXAZOLE AND TRIMETHOPRIM 1 TABLET: 800; 160 TABLET ORAL at 09:31

## 2019-03-06 RX ADMIN — NICOTINE POLACRILEX 4 MG: 4 GUM, CHEWING BUCCAL at 20:11

## 2019-03-06 ASSESSMENT — PAIN SCALES - GENERAL
PAINLEVEL_OUTOF10: 5
PAINLEVEL_OUTOF10: 0

## 2019-03-07 VITALS
WEIGHT: 229.94 LBS | SYSTOLIC BLOOD PRESSURE: 178 MMHG | DIASTOLIC BLOOD PRESSURE: 89 MMHG | OXYGEN SATURATION: 92 % | BODY MASS INDEX: 39.26 KG/M2 | TEMPERATURE: 97 F | HEIGHT: 64 IN | RESPIRATION RATE: 16 BRPM | HEART RATE: 70 BPM

## 2019-03-07 PROCEDURE — 6370000000 HC RX 637 (ALT 250 FOR IP): Performed by: INTERNAL MEDICINE

## 2019-03-07 PROCEDURE — 99239 HOSP IP/OBS DSCHRG MGMT >30: CPT | Performed by: PSYCHIATRY & NEUROLOGY

## 2019-03-07 PROCEDURE — 6370000000 HC RX 637 (ALT 250 FOR IP): Performed by: PSYCHIATRY & NEUROLOGY

## 2019-03-07 RX ORDER — ARIPIPRAZOLE 20 MG/1
20 TABLET ORAL NIGHTLY
Qty: 30 TABLET | Refills: 1 | Status: ON HOLD | OUTPATIENT
Start: 2019-03-07 | End: 2020-01-21 | Stop reason: HOSPADM

## 2019-03-07 RX ORDER — CITALOPRAM 10 MG/1
10 TABLET ORAL DAILY
Qty: 15 TABLET | Refills: 2 | Status: ON HOLD | OUTPATIENT
Start: 2019-03-07 | End: 2019-10-09 | Stop reason: HOSPADM

## 2019-03-07 RX ORDER — OXCARBAZEPINE 150 MG/1
450 TABLET, FILM COATED ORAL 2 TIMES DAILY
Qty: 90 TABLET | Refills: 2 | Status: ON HOLD | OUTPATIENT
Start: 2019-03-07 | End: 2020-01-21 | Stop reason: HOSPADM

## 2019-03-07 RX ADMIN — FLUTICASONE PROPIONATE 2 SPRAY: 50 SPRAY, METERED NASAL at 09:39

## 2019-03-07 RX ADMIN — METOPROLOL TARTRATE 25 MG: 25 TABLET ORAL at 08:57

## 2019-03-07 RX ADMIN — OXCARBAZEPINE 450 MG: 300 TABLET, FILM COATED ORAL at 08:56

## 2019-03-07 RX ADMIN — CITALOPRAM HYDROBROMIDE 10 MG: 10 TABLET ORAL at 08:56

## 2019-03-07 RX ADMIN — ACETAMINOPHEN 650 MG: 325 TABLET ORAL at 11:03

## 2019-03-07 RX ADMIN — CETIRIZINE HYDROCHLORIDE 10 MG: 10 TABLET, FILM COATED ORAL at 08:56

## 2019-03-07 ASSESSMENT — PAIN SCALES - GENERAL: PAINLEVEL_OUTOF10: 6

## 2019-10-04 ENCOUNTER — HOSPITAL ENCOUNTER (INPATIENT)
Age: 64
LOS: 5 days | Discharge: HOME | DRG: 885 | End: 2019-10-09
Attending: EMERGENCY MEDICINE | Admitting: PSYCHIATRY & NEUROLOGY
Payer: MEDICARE

## 2019-10-04 DIAGNOSIS — F31.10 BIPOLAR AFFECTIVE DISORDER, CURRENT EPISODE MANIC, CURRENT EPISODE SEVERITY UNSPECIFIED (HCC): ICD-10-CM

## 2019-10-04 DIAGNOSIS — I10 ESSENTIAL HYPERTENSION: Primary | ICD-10-CM

## 2019-10-04 PROBLEM — F31.12 BIPOLAR 1 DISORDER, MANIC, MODERATE (HCC): Status: ACTIVE | Noted: 2019-10-04

## 2019-10-04 LAB
ACETAMINOPHEN LEVEL: 7 UG/ML (ref 10–30)
ALBUMIN SERPL-MCNC: 4.4 G/DL (ref 3.5–4.6)
ALP BLD-CCNC: 106 U/L (ref 40–130)
ALT SERPL-CCNC: 39 U/L (ref 0–33)
AMPHETAMINE SCREEN, URINE: NORMAL
ANION GAP SERPL CALCULATED.3IONS-SCNC: 13 MEQ/L (ref 9–15)
AST SERPL-CCNC: 36 U/L (ref 0–35)
BACTERIA: NEGATIVE /HPF
BARBITURATE SCREEN URINE: NORMAL
BASOPHILS ABSOLUTE: 0.1 K/UL (ref 0–0.2)
BASOPHILS RELATIVE PERCENT: 1.2 %
BENZODIAZEPINE SCREEN, URINE: NORMAL
BILIRUB SERPL-MCNC: 0.4 MG/DL (ref 0.2–0.7)
BILIRUBIN URINE: NEGATIVE
BLOOD, URINE: NEGATIVE
BUN BLDV-MCNC: 5 MG/DL (ref 8–23)
CALCIUM SERPL-MCNC: 9.7 MG/DL (ref 8.5–9.9)
CANNABINOID SCREEN URINE: NORMAL
CHLORIDE BLD-SCNC: 97 MEQ/L (ref 95–107)
CLARITY: CLEAR
CO2: 26 MEQ/L (ref 20–31)
COCAINE METABOLITE SCREEN URINE: NORMAL
COLOR: YELLOW
CREAT SERPL-MCNC: 0.58 MG/DL (ref 0.5–0.9)
EOSINOPHILS ABSOLUTE: 0.1 K/UL (ref 0–0.7)
EOSINOPHILS RELATIVE PERCENT: 1.1 %
EPITHELIAL CELLS, UA: NORMAL /HPF (ref 0–5)
ETHANOL PERCENT: NORMAL G/DL
ETHANOL: <10 MG/DL (ref 0–0.08)
GFR AFRICAN AMERICAN: >60
GFR NON-AFRICAN AMERICAN: >60
GLOBULIN: 3.2 G/DL (ref 2.3–3.5)
GLUCOSE BLD-MCNC: 134 MG/DL (ref 70–99)
GLUCOSE URINE: NEGATIVE MG/DL
HCT VFR BLD CALC: 39 % (ref 37–47)
HEMOGLOBIN: 13.3 G/DL (ref 12–16)
HYALINE CASTS: NORMAL /HPF (ref 0–5)
KETONES, URINE: NEGATIVE MG/DL
LEUKOCYTE ESTERASE, URINE: ABNORMAL
LYMPHOCYTES ABSOLUTE: 3.8 K/UL (ref 1–4.8)
LYMPHOCYTES RELATIVE PERCENT: 32.4 %
Lab: NORMAL
MCH RBC QN AUTO: 29.3 PG (ref 27–31.3)
MCHC RBC AUTO-ENTMCNC: 34.2 % (ref 33–37)
MCV RBC AUTO: 85.7 FL (ref 82–100)
METHADONE SCREEN, URINE: NORMAL
MONOCYTES ABSOLUTE: 0.9 K/UL (ref 0.2–0.8)
MONOCYTES RELATIVE PERCENT: 7.2 %
NEUTROPHILS ABSOLUTE: 6.9 K/UL (ref 1.4–6.5)
NEUTROPHILS RELATIVE PERCENT: 58.1 %
NITRITE, URINE: NEGATIVE
OPIATE SCREEN URINE: NORMAL
OXYCODONE URINE: NORMAL
PDW BLD-RTO: 14 % (ref 11.5–14.5)
PH UA: 5.5 (ref 5–9)
PHENCYCLIDINE SCREEN URINE: NORMAL
PLATELET # BLD: 287 K/UL (ref 130–400)
POTASSIUM SERPL-SCNC: 3.9 MEQ/L (ref 3.4–4.9)
PROPOXYPHENE SCREEN: NORMAL
PROTEIN UA: NEGATIVE MG/DL
RBC # BLD: 4.55 M/UL (ref 4.2–5.4)
RBC UA: NORMAL /HPF (ref 0–5)
SALICYLATE, SERUM: <0.3 MG/DL (ref 15–30)
SODIUM BLD-SCNC: 136 MEQ/L (ref 135–144)
SPECIFIC GRAVITY UA: 1.01 (ref 1–1.03)
TOTAL CK: 71 U/L (ref 0–170)
TOTAL PROTEIN: 7.6 G/DL (ref 6.3–8)
URINE REFLEX TO CULTURE: YES
UROBILINOGEN, URINE: 0.2 E.U./DL
WBC # BLD: 11.9 K/UL (ref 4.8–10.8)
WBC UA: NORMAL /HPF (ref 0–5)

## 2019-10-04 PROCEDURE — 80307 DRUG TEST PRSMV CHEM ANLYZR: CPT

## 2019-10-04 PROCEDURE — 1240000000 HC EMOTIONAL WELLNESS R&B

## 2019-10-04 PROCEDURE — 82550 ASSAY OF CK (CPK): CPT

## 2019-10-04 PROCEDURE — 85025 COMPLETE CBC W/AUTO DIFF WBC: CPT

## 2019-10-04 PROCEDURE — 36415 COLL VENOUS BLD VENIPUNCTURE: CPT

## 2019-10-04 PROCEDURE — G0480 DRUG TEST DEF 1-7 CLASSES: HCPCS

## 2019-10-04 PROCEDURE — 87086 URINE CULTURE/COLONY COUNT: CPT

## 2019-10-04 PROCEDURE — 80053 COMPREHEN METABOLIC PANEL: CPT

## 2019-10-04 PROCEDURE — 99285 EMERGENCY DEPT VISIT HI MDM: CPT

## 2019-10-04 PROCEDURE — 6370000000 HC RX 637 (ALT 250 FOR IP): Performed by: EMERGENCY MEDICINE

## 2019-10-04 PROCEDURE — 81001 URINALYSIS AUTO W/SCOPE: CPT

## 2019-10-04 PROCEDURE — 6370000000 HC RX 637 (ALT 250 FOR IP): Performed by: PHYSICIAN ASSISTANT

## 2019-10-04 RX ORDER — METOPROLOL TARTRATE 50 MG/1
50 TABLET, FILM COATED ORAL ONCE
Status: COMPLETED | OUTPATIENT
Start: 2019-10-04 | End: 2019-10-04

## 2019-10-04 RX ORDER — ACETAMINOPHEN 500 MG
1000 TABLET ORAL ONCE
Status: COMPLETED | OUTPATIENT
Start: 2019-10-04 | End: 2019-10-04

## 2019-10-04 RX ORDER — LOPERAMIDE HYDROCHLORIDE 2 MG/1
2 CAPSULE ORAL ONCE
Status: COMPLETED | OUTPATIENT
Start: 2019-10-04 | End: 2019-10-04

## 2019-10-04 RX ADMIN — LOPERAMIDE HYDROCHLORIDE 2 MG: 2 CAPSULE ORAL at 21:09

## 2019-10-04 RX ADMIN — ACETAMINOPHEN 1000 MG: 500 TABLET ORAL at 15:08

## 2019-10-04 RX ADMIN — METOPROLOL TARTRATE 50 MG: 50 TABLET ORAL at 15:08

## 2019-10-05 LAB
CHOLESTEROL, TOTAL: 151 MG/DL (ref 0–199)
HDLC SERPL-MCNC: 34 MG/DL (ref 40–59)
LDL CHOLESTEROL CALCULATED: 96 MG/DL (ref 0–129)
TRIGL SERPL-MCNC: 105 MG/DL (ref 0–150)

## 2019-10-05 PROCEDURE — 6370000000 HC RX 637 (ALT 250 FOR IP): Performed by: PSYCHIATRY & NEUROLOGY

## 2019-10-05 PROCEDURE — 1240000000 HC EMOTIONAL WELLNESS R&B

## 2019-10-05 PROCEDURE — 6370000000 HC RX 637 (ALT 250 FOR IP): Performed by: NURSE PRACTITIONER

## 2019-10-05 PROCEDURE — 36415 COLL VENOUS BLD VENIPUNCTURE: CPT

## 2019-10-05 PROCEDURE — 80061 LIPID PANEL: CPT

## 2019-10-05 RX ORDER — HALOPERIDOL 5 MG/ML
5 INJECTION INTRAMUSCULAR EVERY 6 HOURS PRN
Status: DISCONTINUED | OUTPATIENT
Start: 2019-10-05 | End: 2019-10-09 | Stop reason: HOSPADM

## 2019-10-05 RX ORDER — ARIPIPRAZOLE 20 MG/1
20 TABLET ORAL NIGHTLY
Status: DISCONTINUED | OUTPATIENT
Start: 2019-10-05 | End: 2019-10-09 | Stop reason: HOSPADM

## 2019-10-05 RX ORDER — TRAZODONE HYDROCHLORIDE 50 MG/1
50 TABLET ORAL NIGHTLY PRN
Status: DISCONTINUED | OUTPATIENT
Start: 2019-10-05 | End: 2019-10-09 | Stop reason: HOSPADM

## 2019-10-05 RX ORDER — CITALOPRAM 10 MG/1
10 TABLET ORAL DAILY
Status: DISCONTINUED | OUTPATIENT
Start: 2019-10-05 | End: 2019-10-05

## 2019-10-05 RX ORDER — ACETAMINOPHEN 325 MG/1
650 TABLET ORAL EVERY 4 HOURS PRN
Status: DISCONTINUED | OUTPATIENT
Start: 2019-10-05 | End: 2019-10-09 | Stop reason: HOSPADM

## 2019-10-05 RX ORDER — FLUTICASONE PROPIONATE 50 MCG
1 SPRAY, SUSPENSION (ML) NASAL DAILY
COMMUNITY

## 2019-10-05 RX ORDER — FLUTICASONE PROPIONATE 50 MCG
1 SPRAY, SUSPENSION (ML) NASAL DAILY
Status: DISCONTINUED | OUTPATIENT
Start: 2019-10-05 | End: 2019-10-09 | Stop reason: HOSPADM

## 2019-10-05 RX ORDER — LOPERAMIDE HYDROCHLORIDE 2 MG/1
2 CAPSULE ORAL EVERY 8 HOURS PRN
Status: DISCONTINUED | OUTPATIENT
Start: 2019-10-05 | End: 2019-10-09 | Stop reason: HOSPADM

## 2019-10-05 RX ORDER — NICOTINE 21 MG/24HR
1 PATCH, TRANSDERMAL 24 HOURS TRANSDERMAL DAILY
Status: DISCONTINUED | OUTPATIENT
Start: 2019-10-05 | End: 2019-10-09 | Stop reason: HOSPADM

## 2019-10-05 RX ORDER — HALOPERIDOL 5 MG
5 TABLET ORAL EVERY 6 HOURS PRN
Status: DISCONTINUED | OUTPATIENT
Start: 2019-10-05 | End: 2019-10-09 | Stop reason: HOSPADM

## 2019-10-05 RX ORDER — HYDROXYZINE HYDROCHLORIDE 50 MG/ML
50 INJECTION, SOLUTION INTRAMUSCULAR EVERY 6 HOURS PRN
Status: DISCONTINUED | OUTPATIENT
Start: 2019-10-05 | End: 2019-10-09 | Stop reason: HOSPADM

## 2019-10-05 RX ORDER — HYDROXYZINE PAMOATE 50 MG/1
50 CAPSULE ORAL EVERY 6 HOURS PRN
Status: DISCONTINUED | OUTPATIENT
Start: 2019-10-05 | End: 2019-10-09 | Stop reason: HOSPADM

## 2019-10-05 RX ORDER — BENZTROPINE MESYLATE 1 MG/ML
2 INJECTION INTRAMUSCULAR; INTRAVENOUS 2 TIMES DAILY PRN
Status: DISCONTINUED | OUTPATIENT
Start: 2019-10-05 | End: 2019-10-09 | Stop reason: HOSPADM

## 2019-10-05 RX ADMIN — ACETAMINOPHEN 650 MG: 325 TABLET ORAL at 17:07

## 2019-10-05 RX ADMIN — OXCARBAZEPINE 450 MG: 300 TABLET, FILM COATED ORAL at 08:26

## 2019-10-05 RX ADMIN — ARIPIPRAZOLE 20 MG: 20 TABLET ORAL at 00:39

## 2019-10-05 RX ADMIN — OXCARBAZEPINE 450 MG: 300 TABLET, FILM COATED ORAL at 00:39

## 2019-10-05 RX ADMIN — CITALOPRAM HYDROBROMIDE 10 MG: 10 TABLET ORAL at 08:26

## 2019-10-05 RX ADMIN — OXCARBAZEPINE 450 MG: 300 TABLET, FILM COATED ORAL at 22:04

## 2019-10-05 RX ADMIN — FLUTICASONE PROPIONATE 1 SPRAY: 50 SPRAY, METERED NASAL at 17:07

## 2019-10-05 RX ADMIN — ARIPIPRAZOLE 20 MG: 20 TABLET ORAL at 22:04

## 2019-10-05 RX ADMIN — HYDROXYZINE PAMOATE 50 MG: 50 CAPSULE ORAL at 17:23

## 2019-10-05 RX ADMIN — METOPROLOL TARTRATE 25 MG: 25 TABLET ORAL at 13:24

## 2019-10-05 ASSESSMENT — ENCOUNTER SYMPTOMS
RESPIRATORY NEGATIVE: 1
ALLERGIC/IMMUNOLOGIC NEGATIVE: 1
EYES NEGATIVE: 1
GASTROINTESTINAL NEGATIVE: 1

## 2019-10-05 ASSESSMENT — SLEEP AND FATIGUE QUESTIONNAIRES
DO YOU HAVE DIFFICULTY SLEEPING: YES
DIFFICULTY STAYING ASLEEP: NO
SLEEP PATTERN: DIFFICULTY FALLING ASLEEP
DIFFICULTY ARISING: NO
AVERAGE NUMBER OF SLEEP HOURS: 0
DIFFICULTY FALLING ASLEEP: YES
RESTFUL SLEEP: NO
DO YOU USE A SLEEP AID: NO

## 2019-10-05 ASSESSMENT — PAIN SCALES - GENERAL
PAINLEVEL_OUTOF10: 4
PAINLEVEL_OUTOF10: 0

## 2019-10-05 ASSESSMENT — LIFESTYLE VARIABLES: HISTORY_ALCOHOL_USE: NO

## 2019-10-06 LAB
EKG ATRIAL RATE: 70 BPM
EKG P AXIS: 65 DEGREES
EKG P-R INTERVAL: 150 MS
EKG Q-T INTERVAL: 418 MS
EKG QRS DURATION: 92 MS
EKG QTC CALCULATION (BAZETT): 451 MS
EKG R AXIS: 21 DEGREES
EKG T AXIS: 55 DEGREES
EKG VENTRICULAR RATE: 70 BPM

## 2019-10-06 PROCEDURE — 1240000000 HC EMOTIONAL WELLNESS R&B

## 2019-10-06 PROCEDURE — 6370000000 HC RX 637 (ALT 250 FOR IP): Performed by: NURSE PRACTITIONER

## 2019-10-06 PROCEDURE — 93005 ELECTROCARDIOGRAM TRACING: CPT | Performed by: PSYCHIATRY & NEUROLOGY

## 2019-10-06 PROCEDURE — 6370000000 HC RX 637 (ALT 250 FOR IP): Performed by: PSYCHIATRY & NEUROLOGY

## 2019-10-06 RX ORDER — LIDOCAINE 4 G/G
1 PATCH TOPICAL DAILY PRN
Status: DISCONTINUED | OUTPATIENT
Start: 2019-10-06 | End: 2019-10-09 | Stop reason: HOSPADM

## 2019-10-06 RX ADMIN — FLUTICASONE PROPIONATE 1 SPRAY: 50 SPRAY, METERED NASAL at 22:22

## 2019-10-06 RX ADMIN — ACETAMINOPHEN 650 MG: 325 TABLET ORAL at 21:27

## 2019-10-06 RX ADMIN — OXCARBAZEPINE 450 MG: 300 TABLET, FILM COATED ORAL at 21:24

## 2019-10-06 RX ADMIN — OXCARBAZEPINE 450 MG: 300 TABLET, FILM COATED ORAL at 09:44

## 2019-10-06 RX ADMIN — ACETAMINOPHEN 650 MG: 325 TABLET ORAL at 11:26

## 2019-10-06 RX ADMIN — TRAZODONE HYDROCHLORIDE 50 MG: 50 TABLET ORAL at 23:51

## 2019-10-06 RX ADMIN — MAGNESIUM HYDROXIDE 30 ML: 400 SUSPENSION ORAL at 21:27

## 2019-10-06 RX ADMIN — HYDROXYZINE PAMOATE 50 MG: 50 CAPSULE ORAL at 13:22

## 2019-10-06 RX ADMIN — BENZOCAINE AND MENTHOL 1 LOZENGE: 15; 3.6 LOZENGE ORAL at 11:26

## 2019-10-06 RX ADMIN — ARIPIPRAZOLE 20 MG: 20 TABLET ORAL at 21:24

## 2019-10-06 ASSESSMENT — PAIN SCALES - GENERAL
PAINLEVEL_OUTOF10: 5
PAINLEVEL_OUTOF10: 7

## 2019-10-07 PROBLEM — E87.1 HYPONATREMIA: Status: RESOLVED | Noted: 2019-02-28 | Resolved: 2019-10-07

## 2019-10-07 PROBLEM — F31.2 SEVERE MANIC BIPOLAR 1 DISORDER WITH PSYCHOTIC BEHAVIOR (HCC): Status: ACTIVE | Noted: 2019-10-07

## 2019-10-07 PROBLEM — F31.12 BIPOLAR 1 DISORDER, MANIC, MODERATE (HCC): Status: RESOLVED | Noted: 2019-10-04 | Resolved: 2019-10-07

## 2019-10-07 PROBLEM — F31.10 BIPOLAR AFFECTIVE DISORDER, CURRENT EPISODE MANIC (HCC): Status: RESOLVED | Noted: 2019-03-03 | Resolved: 2019-10-07

## 2019-10-07 LAB — URINE CULTURE, ROUTINE: NORMAL

## 2019-10-07 PROCEDURE — 99222 1ST HOSP IP/OBS MODERATE 55: CPT | Performed by: INTERNAL MEDICINE

## 2019-10-07 PROCEDURE — 6370000000 HC RX 637 (ALT 250 FOR IP): Performed by: PSYCHIATRY & NEUROLOGY

## 2019-10-07 PROCEDURE — 99232 SBSQ HOSP IP/OBS MODERATE 35: CPT | Performed by: PSYCHIATRY & NEUROLOGY

## 2019-10-07 PROCEDURE — 93010 ELECTROCARDIOGRAM REPORT: CPT | Performed by: INTERNAL MEDICINE

## 2019-10-07 PROCEDURE — 1240000000 HC EMOTIONAL WELLNESS R&B

## 2019-10-07 PROCEDURE — 6370000000 HC RX 637 (ALT 250 FOR IP): Performed by: INTERNAL MEDICINE

## 2019-10-07 RX ADMIN — METOPROLOL TARTRATE 25 MG: 25 TABLET ORAL at 14:39

## 2019-10-07 RX ADMIN — FLUTICASONE PROPIONATE 1 SPRAY: 50 SPRAY, METERED NASAL at 10:04

## 2019-10-07 RX ADMIN — OXCARBAZEPINE 450 MG: 300 TABLET, FILM COATED ORAL at 10:02

## 2019-10-07 RX ADMIN — METOPROLOL TARTRATE 25 MG: 25 TABLET ORAL at 21:14

## 2019-10-07 RX ADMIN — ARIPIPRAZOLE 20 MG: 20 TABLET ORAL at 21:14

## 2019-10-07 RX ADMIN — HYDROXYZINE PAMOATE 50 MG: 50 CAPSULE ORAL at 03:06

## 2019-10-07 RX ADMIN — OXCARBAZEPINE 450 MG: 300 TABLET, FILM COATED ORAL at 21:13

## 2019-10-08 PROCEDURE — 1240000000 HC EMOTIONAL WELLNESS R&B

## 2019-10-08 PROCEDURE — 6370000000 HC RX 637 (ALT 250 FOR IP): Performed by: INTERNAL MEDICINE

## 2019-10-08 PROCEDURE — 6370000000 HC RX 637 (ALT 250 FOR IP): Performed by: PSYCHIATRY & NEUROLOGY

## 2019-10-08 PROCEDURE — 6370000000 HC RX 637 (ALT 250 FOR IP): Performed by: NURSE PRACTITIONER

## 2019-10-08 RX ADMIN — METOPROLOL TARTRATE 25 MG: 25 TABLET ORAL at 10:12

## 2019-10-08 RX ADMIN — ACETAMINOPHEN 650 MG: 325 TABLET ORAL at 10:11

## 2019-10-08 RX ADMIN — METOPROLOL TARTRATE 25 MG: 25 TABLET ORAL at 20:37

## 2019-10-08 RX ADMIN — OXCARBAZEPINE 450 MG: 300 TABLET, FILM COATED ORAL at 20:35

## 2019-10-08 RX ADMIN — HYDROXYZINE PAMOATE 50 MG: 50 CAPSULE ORAL at 20:37

## 2019-10-08 RX ADMIN — OXCARBAZEPINE 450 MG: 300 TABLET, FILM COATED ORAL at 10:11

## 2019-10-08 RX ADMIN — BENZOCAINE AND MENTHOL 1 LOZENGE: 15; 3.6 LOZENGE ORAL at 10:12

## 2019-10-08 RX ADMIN — BENZOCAINE AND MENTHOL 1 LOZENGE: 15; 3.6 LOZENGE ORAL at 02:43

## 2019-10-08 RX ADMIN — ARIPIPRAZOLE 20 MG: 20 TABLET ORAL at 20:37

## 2019-10-08 RX ADMIN — FLUTICASONE PROPIONATE 1 SPRAY: 50 SPRAY, METERED NASAL at 10:09

## 2019-10-08 ASSESSMENT — PAIN SCALES - GENERAL
PAINLEVEL_OUTOF10: 4
PAINLEVEL_OUTOF10: 0

## 2019-10-09 VITALS
DIASTOLIC BLOOD PRESSURE: 78 MMHG | SYSTOLIC BLOOD PRESSURE: 125 MMHG | TEMPERATURE: 98 F | RESPIRATION RATE: 18 BRPM | BODY MASS INDEX: 39.27 KG/M2 | WEIGHT: 230 LBS | HEART RATE: 83 BPM | HEIGHT: 64 IN | OXYGEN SATURATION: 93 %

## 2019-10-09 PROCEDURE — 6370000000 HC RX 637 (ALT 250 FOR IP): Performed by: INTERNAL MEDICINE

## 2019-10-09 PROCEDURE — 6370000000 HC RX 637 (ALT 250 FOR IP): Performed by: NURSE PRACTITIONER

## 2019-10-09 PROCEDURE — 6370000000 HC RX 637 (ALT 250 FOR IP): Performed by: PSYCHIATRY & NEUROLOGY

## 2019-10-09 PROCEDURE — 99239 HOSP IP/OBS DSCHRG MGMT >30: CPT | Performed by: PSYCHIATRY & NEUROLOGY

## 2019-10-09 RX ADMIN — FLUTICASONE PROPIONATE 1 SPRAY: 50 SPRAY, METERED NASAL at 09:51

## 2019-10-09 RX ADMIN — BENZOCAINE AND MENTHOL 1 LOZENGE: 15; 3.6 LOZENGE ORAL at 04:15

## 2019-10-09 RX ADMIN — METOPROLOL TARTRATE 25 MG: 25 TABLET ORAL at 09:51

## 2019-10-09 RX ADMIN — OXCARBAZEPINE 450 MG: 300 TABLET, FILM COATED ORAL at 09:51

## 2020-01-11 ENCOUNTER — HOSPITAL ENCOUNTER (INPATIENT)
Age: 65
LOS: 10 days | Discharge: HOME | DRG: 885 | End: 2020-01-21
Attending: EMERGENCY MEDICINE | Admitting: PSYCHIATRY & NEUROLOGY
Payer: MEDICARE

## 2020-01-11 PROBLEM — F31.9 BIPOLAR 1 DISORDER (HCC): Status: ACTIVE | Noted: 2020-01-11

## 2020-01-11 LAB
ACETAMINOPHEN LEVEL: <5 UG/ML (ref 10–30)
ALBUMIN SERPL-MCNC: 3.9 G/DL (ref 3.5–4.6)
ALP BLD-CCNC: 97 U/L (ref 40–130)
ALT SERPL-CCNC: 33 U/L (ref 0–33)
AMPHETAMINE SCREEN, URINE: NORMAL
ANION GAP SERPL CALCULATED.3IONS-SCNC: 13 MEQ/L (ref 9–15)
AST SERPL-CCNC: 30 U/L (ref 0–35)
BACTERIA: ABNORMAL /HPF
BARBITURATE SCREEN URINE: NORMAL
BASOPHILS ABSOLUTE: 0.1 K/UL (ref 0–0.2)
BASOPHILS RELATIVE PERCENT: 0.8 %
BENZODIAZEPINE SCREEN, URINE: NORMAL
BILIRUB SERPL-MCNC: 0.4 MG/DL (ref 0.2–0.7)
BILIRUBIN URINE: NEGATIVE
BLOOD, URINE: NEGATIVE
BUN BLDV-MCNC: 5 MG/DL (ref 8–23)
CALCIUM SERPL-MCNC: 9.5 MG/DL (ref 8.5–9.9)
CANNABINOID SCREEN URINE: NORMAL
CHLORIDE BLD-SCNC: 98 MEQ/L (ref 95–107)
CLARITY: ABNORMAL
CO2: 27 MEQ/L (ref 20–31)
COCAINE METABOLITE SCREEN URINE: NORMAL
COLOR: YELLOW
CREAT SERPL-MCNC: 0.58 MG/DL (ref 0.5–0.9)
EKG ATRIAL RATE: 99 BPM
EKG P AXIS: 56 DEGREES
EKG P-R INTERVAL: 136 MS
EKG Q-T INTERVAL: 362 MS
EKG QRS DURATION: 88 MS
EKG QTC CALCULATION (BAZETT): 464 MS
EKG R AXIS: 25 DEGREES
EKG T AXIS: 93 DEGREES
EKG VENTRICULAR RATE: 99 BPM
EOSINOPHILS ABSOLUTE: 0.1 K/UL (ref 0–0.7)
EOSINOPHILS RELATIVE PERCENT: 0.8 %
EPITHELIAL CELLS, UA: ABNORMAL /HPF (ref 0–5)
ETHANOL PERCENT: NORMAL G/DL
ETHANOL: <10 MG/DL (ref 0–0.08)
GFR AFRICAN AMERICAN: >60
GFR NON-AFRICAN AMERICAN: >60
GLOBULIN: 3.1 G/DL (ref 2.3–3.5)
GLUCOSE BLD-MCNC: 144 MG/DL (ref 70–99)
GLUCOSE URINE: NEGATIVE MG/DL
HCG(URINE) PREGNANCY TEST: NEGATIVE
HCT VFR BLD CALC: 37.9 % (ref 37–47)
HEMOGLOBIN: 12.7 G/DL (ref 12–16)
HYALINE CASTS: ABNORMAL /HPF (ref 0–5)
KETONES, URINE: NEGATIVE MG/DL
LEUKOCYTE ESTERASE, URINE: ABNORMAL
LYMPHOCYTES ABSOLUTE: 2.9 K/UL (ref 1–4.8)
LYMPHOCYTES RELATIVE PERCENT: 28.7 %
Lab: NORMAL
MCH RBC QN AUTO: 28.6 PG (ref 27–31.3)
MCHC RBC AUTO-ENTMCNC: 33.6 % (ref 33–37)
MCV RBC AUTO: 85.3 FL (ref 82–100)
METHADONE SCREEN, URINE: NORMAL
MONOCYTES ABSOLUTE: 0.7 K/UL (ref 0.2–0.8)
MONOCYTES RELATIVE PERCENT: 7 %
NEUTROPHILS ABSOLUTE: 6.4 K/UL (ref 1.4–6.5)
NEUTROPHILS RELATIVE PERCENT: 62.7 %
NITRITE, URINE: NEGATIVE
OPIATE SCREEN URINE: NORMAL
OXYCODONE URINE: NORMAL
PDW BLD-RTO: 13.9 % (ref 11.5–14.5)
PH UA: 5.5 (ref 5–9)
PHENCYCLIDINE SCREEN URINE: NORMAL
PLATELET # BLD: 275 K/UL (ref 130–400)
POTASSIUM SERPL-SCNC: 4 MEQ/L (ref 3.4–4.9)
PROPOXYPHENE SCREEN: NORMAL
PROTEIN UA: NEGATIVE MG/DL
RBC # BLD: 4.44 M/UL (ref 4.2–5.4)
RBC UA: ABNORMAL /HPF (ref 0–5)
SALICYLATE, SERUM: <0.3 MG/DL (ref 15–30)
SODIUM BLD-SCNC: 138 MEQ/L (ref 135–144)
SPECIFIC GRAVITY UA: 1.01 (ref 1–1.03)
TOTAL CK: 54 U/L (ref 0–170)
TOTAL PROTEIN: 7 G/DL (ref 6.3–8)
TSH SERPL DL<=0.05 MIU/L-ACNC: 3.04 UIU/ML (ref 0.44–3.86)
URINE REFLEX TO CULTURE: YES
UROBILINOGEN, URINE: 0.2 E.U./DL
WBC # BLD: 10.2 K/UL (ref 4.8–10.8)
WBC UA: ABNORMAL /HPF (ref 0–5)

## 2020-01-11 PROCEDURE — G0480 DRUG TEST DEF 1-7 CLASSES: HCPCS

## 2020-01-11 PROCEDURE — 6370000000 HC RX 637 (ALT 250 FOR IP): Performed by: EMERGENCY MEDICINE

## 2020-01-11 PROCEDURE — 6360000002 HC RX W HCPCS: Performed by: EMERGENCY MEDICINE

## 2020-01-11 PROCEDURE — 36415 COLL VENOUS BLD VENIPUNCTURE: CPT

## 2020-01-11 PROCEDURE — 82550 ASSAY OF CK (CPK): CPT

## 2020-01-11 PROCEDURE — 81003 URINALYSIS AUTO W/O SCOPE: CPT

## 2020-01-11 PROCEDURE — 84703 CHORIONIC GONADOTROPIN ASSAY: CPT

## 2020-01-11 PROCEDURE — 1240000000 HC EMOTIONAL WELLNESS R&B

## 2020-01-11 PROCEDURE — 96372 THER/PROPH/DIAG INJ SC/IM: CPT

## 2020-01-11 PROCEDURE — 93005 ELECTROCARDIOGRAM TRACING: CPT | Performed by: PSYCHIATRY & NEUROLOGY

## 2020-01-11 PROCEDURE — 87086 URINE CULTURE/COLONY COUNT: CPT

## 2020-01-11 PROCEDURE — 84443 ASSAY THYROID STIM HORMONE: CPT

## 2020-01-11 PROCEDURE — 99285 EMERGENCY DEPT VISIT HI MDM: CPT

## 2020-01-11 PROCEDURE — 80307 DRUG TEST PRSMV CHEM ANLYZR: CPT

## 2020-01-11 PROCEDURE — 80053 COMPREHEN METABOLIC PANEL: CPT

## 2020-01-11 PROCEDURE — 85025 COMPLETE CBC W/AUTO DIFF WBC: CPT

## 2020-01-11 PROCEDURE — 2580000003 HC RX 258

## 2020-01-11 RX ORDER — NICOTINE 21 MG/24HR
1 PATCH, TRANSDERMAL 24 HOURS TRANSDERMAL DAILY
Status: DISCONTINUED | OUTPATIENT
Start: 2020-01-12 | End: 2020-01-21 | Stop reason: HOSPADM

## 2020-01-11 RX ORDER — HYDROXYZINE PAMOATE 50 MG/1
50 CAPSULE ORAL EVERY 6 HOURS PRN
Status: DISCONTINUED | OUTPATIENT
Start: 2020-01-11 | End: 2020-01-21 | Stop reason: HOSPADM

## 2020-01-11 RX ORDER — CITALOPRAM 20 MG/1
20 TABLET ORAL DAILY
Status: ON HOLD | COMMUNITY
End: 2020-01-21 | Stop reason: HOSPADM

## 2020-01-11 RX ORDER — FLUTICASONE PROPIONATE 50 MCG
1 SPRAY, SUSPENSION (ML) NASAL DAILY
Status: DISCONTINUED | OUTPATIENT
Start: 2020-01-12 | End: 2020-01-21 | Stop reason: HOSPADM

## 2020-01-11 RX ORDER — ARIPIPRAZOLE 10 MG/1
10 TABLET ORAL NIGHTLY
Status: DISCONTINUED | OUTPATIENT
Start: 2020-01-11 | End: 2020-01-15

## 2020-01-11 RX ORDER — OXCARBAZEPINE 300 MG/1
300 TABLET, FILM COATED ORAL 2 TIMES DAILY
Status: DISCONTINUED | OUTPATIENT
Start: 2020-01-11 | End: 2020-01-21 | Stop reason: HOSPADM

## 2020-01-11 RX ORDER — DIPHENHYDRAMINE HYDROCHLORIDE 50 MG/ML
25 INJECTION INTRAMUSCULAR; INTRAVENOUS ONCE
Status: COMPLETED | OUTPATIENT
Start: 2020-01-11 | End: 2020-01-11

## 2020-01-11 RX ORDER — HYDROXYZINE HYDROCHLORIDE 50 MG/ML
50 INJECTION, SOLUTION INTRAMUSCULAR EVERY 6 HOURS PRN
Status: DISCONTINUED | OUTPATIENT
Start: 2020-01-11 | End: 2020-01-21 | Stop reason: HOSPADM

## 2020-01-11 RX ORDER — ACETAMINOPHEN 325 MG/1
650 TABLET ORAL EVERY 4 HOURS PRN
Status: DISCONTINUED | OUTPATIENT
Start: 2020-01-11 | End: 2020-01-21 | Stop reason: HOSPADM

## 2020-01-11 RX ORDER — ACETAMINOPHEN 500 MG
1000 TABLET ORAL ONCE
Status: COMPLETED | OUTPATIENT
Start: 2020-01-11 | End: 2020-01-11

## 2020-01-11 RX ORDER — ZIPRASIDONE MESYLATE 20 MG/ML
20 INJECTION, POWDER, LYOPHILIZED, FOR SOLUTION INTRAMUSCULAR ONCE
Status: COMPLETED | OUTPATIENT
Start: 2020-01-11 | End: 2020-01-11

## 2020-01-11 RX ORDER — LORAZEPAM 2 MG/ML
2 INJECTION INTRAMUSCULAR ONCE
Status: COMPLETED | OUTPATIENT
Start: 2020-01-11 | End: 2020-01-11

## 2020-01-11 RX ORDER — NICOTINE 21 MG/24HR
1 PATCH, TRANSDERMAL 24 HOURS TRANSDERMAL ONCE
Status: COMPLETED | OUTPATIENT
Start: 2020-01-11 | End: 2020-01-12

## 2020-01-11 RX ORDER — LANOLIN ALCOHOL/MO/W.PET/CERES
3 CREAM (GRAM) TOPICAL NIGHTLY PRN
Status: DISCONTINUED | OUTPATIENT
Start: 2020-01-11 | End: 2020-01-21 | Stop reason: HOSPADM

## 2020-01-11 RX ADMIN — WATER 1.2 ML: 1 INJECTION INTRAMUSCULAR; INTRAVENOUS; SUBCUTANEOUS at 14:27

## 2020-01-11 RX ADMIN — DIPHENHYDRAMINE HYDROCHLORIDE 25 MG: 50 INJECTION, SOLUTION INTRAMUSCULAR; INTRAVENOUS at 14:25

## 2020-01-11 RX ADMIN — LORAZEPAM 2 MG: 2 INJECTION INTRAMUSCULAR; INTRAVENOUS at 14:26

## 2020-01-11 RX ADMIN — ZIPRASIDONE MESYLATE 20 MG: 20 INJECTION, POWDER, LYOPHILIZED, FOR SOLUTION INTRAMUSCULAR at 14:27

## 2020-01-11 RX ADMIN — ACETAMINOPHEN 1000 MG: 500 TABLET ORAL at 14:24

## 2020-01-11 ASSESSMENT — ENCOUNTER SYMPTOMS
VOICE CHANGE: 0
ANAL BLEEDING: 0
TROUBLE SWALLOWING: 0
ABDOMINAL PAIN: 0
EYE DISCHARGE: 0
CHOKING: 0
SHORTNESS OF BREATH: 0
BLOOD IN STOOL: 0
CHEST TIGHTNESS: 0
WHEEZING: 0
STRIDOR: 0
VOMITING: 0
EYE PAIN: 0
NAUSEA: 0
PHOTOPHOBIA: 0
FACIAL SWELLING: 0
DIARRHEA: 0
COUGH: 0
EYE ITCHING: 0
ABDOMINAL DISTENTION: 0
CONSTIPATION: 0
EYE REDNESS: 0
COLOR CHANGE: 0
SORE THROAT: 0
RHINORRHEA: 0
BACK PAIN: 0
SINUS PRESSURE: 0

## 2020-01-11 ASSESSMENT — PAIN DESCRIPTION - PAIN TYPE: TYPE: ACUTE PAIN

## 2020-01-11 ASSESSMENT — PAIN SCALES - GENERAL
PAINLEVEL_OUTOF10: 1
PAINLEVEL_OUTOF10: 9

## 2020-01-11 ASSESSMENT — PAIN DESCRIPTION - FREQUENCY: FREQUENCY: CONTINUOUS

## 2020-01-11 ASSESSMENT — PAIN DESCRIPTION - LOCATION: LOCATION: HEAD

## 2020-01-11 ASSESSMENT — PAIN DESCRIPTION - DESCRIPTORS: DESCRIPTORS: HEADACHE

## 2020-01-11 NOTE — ED NOTES
Pt is in bed area quiet and cooperative with no problems and has even respirations with no C/O any kind expressed. Pt's medication given to her was effective and has reduced anxiety and no further acting out behaviors.      Storm Clement RN  01/11/20 0483

## 2020-01-11 NOTE — ED NOTES
Per Anthony Flower at [facility] if pt is admitted to [facility] they will issue a bed for her at [facility].  [facility] staff that pt has medicare but if it is not accurate then [facility] will pay for the indigent bed as [facility] assessed her and felt she needed to be hospitalized.      Reshma Barrow RN  01/11/20 3638

## 2020-01-11 NOTE — ED NOTES
Pt was awakened for dinner but she refused to get up helped pt to the middle of the bed as she was laying on the edge of the bed, she refused to move any further in the bed, and currently refused dinner  She has even respirations with no problems or C/O any kind expressed. Pt has even respirations with no SOB or respiratory distress noted.      Nuno Valdez RN  01/11/20 6724

## 2020-01-11 NOTE — ED TRIAGE NOTES
Pt states she was brought from the [facility] against her will. Per family pt has been increasingly aggressive verbally. Per pt she has been without her medication and she has been under a lot of stress and has not been sleeping well. There has been a lot of recent deaths in the family as well.  Pt all is taking care of daughter at home who has ALzheimers

## 2020-01-11 NOTE — ED NOTES
Pt has three bottles of OTC diarrhea medication and 3 Imodium tablets in individual packets and will send to the pharmacy no other medications was with the pt.      Chelsea Carson RN  01/11/20 4817

## 2020-01-11 NOTE — ED NOTES
Dinner tray received Pt remains sleeping soundly snoring respirations     Swathi Dewey [facility]  01/11/20 5477

## 2020-01-11 NOTE — ED NOTES
2nd attempt to contact Dr Renate Flores. Unable to leave  at this time.       Perry Escobedo RN  01/11/20 9294

## 2020-01-11 NOTE — ED NOTES
The chart states that the pt has BC/BS [employer] advised Uday Liu at [facility] that the pt has insurance     Gia Patel RN  01/11/20 9754

## 2020-01-11 NOTE — ED NOTES
Lab at bedside to draw labs.  Pt [facility]     [facility]  01/11/20 2719

## 2020-01-11 NOTE — ED NOTES
Dispo discussed with Dr Johan Bauer ok to admit to [address] Dx Bipolar      Mukund Meyer, DEVYN  01/11/20 1668

## 2020-01-11 NOTE — ED PROVIDER NOTES
urinating, dysuria, enuresis, flank pain, frequency, genital sores, hematuria and urgency. Musculoskeletal: Negative for arthralgias, back pain, gait problem, joint swelling, myalgias, neck pain and neck stiffness. Skin: Negative for color change, pallor, rash and wound. Allergic/Immunologic: Negative for environmental allergies and food allergies. Neurological: Negative for dizziness, tremors, seizures, syncope, facial asymmetry, speech difficulty, weakness, light-headedness, numbness and headaches. Hematological: Negative for adenopathy. Does not bruise/bleed easily. Psychiatric/Behavioral: Positive for agitation, behavioral problems, confusion and decreased concentration. Negative for dysphoric mood, hallucinations, self-injury, sleep disturbance and suicidal ideas. The patient is nervous/anxious and is hyperactive. Except as noted above the remainder of the review of systems was reviewed and negative. PAST MEDICAL HISTORY     Past Medical History:   Diagnosis Date    Allergic rhinitis     Bipolar 1 disorder ([facility] [address]     Depression     Hyperlipidemia     Hypertension     Irritable bowel syndrome     PTSD (post-traumatic stress disorder)          SURGICAL HISTORY       Past Surgical History:   Procedure Laterality Date    CHOLECYSTECTOMY      HYSTERECTOMY           CURRENT MEDICATIONS       Previous Medications    ARIPIPRAZOLE (ABILIFY) 20 MG TABLET    Take 1 tablet by mouth nightly    CITALOPRAM (CELEXA) 20 MG TABLET    Take 20 mg by mouth daily    FLUTICASONE (FLONASE) 50 MCG/ACT NASAL SPRAY    1 spray by Each Nostril route daily Indications: Per [facility] medication list 10/5/2019    METOPROLOL TARTRATE (LOPRESSOR) 25 MG TABLET    Take 1 tablet by mouth 2 times daily    OXCARBAZEPINE (TRILEPTAL) 150 MG TABLET    Take 3 tablets by mouth 2 times daily       ALLERGIES     Patient has no known allergies.     FAMILY HISTORY       Family History   Problem Relation Age of Onset    Cancer exudate. Eyes:      General: No scleral icterus. Right eye: No discharge. Left eye: No discharge. Conjunctiva/sclera: Conjunctivae normal.      Pupils: Pupils are equal, round, and reactive to light. Neck:      Musculoskeletal: Normal range of motion and neck supple. Thyroid: No thyromegaly. Vascular: No JVD. Trachea: No tracheal deviation. Cardiovascular:      Rate and Rhythm: Normal rate and regular rhythm. Heart sounds: Normal heart sounds. No murmur. No friction rub. No gallop. Pulmonary:      Effort: Pulmonary effort is normal. No respiratory distress. Breath sounds: Normal breath sounds. No stridor. No wheezing or rales. Chest:      Chest wall: No tenderness. Abdominal:      General: Bowel sounds are normal. There is no distension. Palpations: Abdomen is soft. There is no mass. Tenderness: There is no tenderness. There is no guarding or rebound. Musculoskeletal: Normal range of motion. General: No tenderness. Lymphadenopathy:      Cervical: No cervical adenopathy. Skin:     General: Skin is warm and dry. Coloration: Skin is not pale. Findings: No erythema or rash. Neurological:      Mental Status: She is alert and oriented to person, place, and time. Cranial Nerves: No cranial nerve deficit. Motor: No abnormal muscle tone. Coordination: Coordination normal.      Deep Tendon Reflexes: Reflexes are normal and symmetric. Reflexes normal.   Psychiatric:      Comments: Patient is extremely verbally abusive to me. Her thought content is absolutely not normal.  She is extremely manic, very verbose and will follow commands but protests verbally.            DIAGNOSTIC RESULTS     EKG: All EKG's are interpreted by the Emergency Department Physician who either signs or Co-signs this chart in the absence of a cardiologist.    No EKG was indicated or ordered    RADIOLOGY:   Non-plain film images such as CT, Ultrasound and MRI are read by the radiologist. Plain radiographic images are visualized and preliminarily interpreted by the emergency physician with the below findings:    Diagnostic imaging was indicated or ordered    Interpretation per the Radiologist below, if available at the time of this note:    No orders to display         ED BEDSIDE ULTRASOUND:   Performed by ED Physician - none    LABS:  Labs Reviewed   COMPREHENSIVE METABOLIC PANEL - Abnormal; Notable for the following components:       Result Value    Glucose 144 (*)     BUN 5 (*)     All other components within normal limits   ACETAMINOPHEN LEVEL - Abnormal; Notable for the following components:    Acetaminophen Level <5 (*)     All other components within normal limits   SALICYLATE LEVEL - Abnormal; Notable for the following components:    Salicylate, Serum <4.1 (*)     All other components within normal limits   URINE RT REFLEX TO CULTURE - Abnormal; Notable for the following components:    Clarity, UA CLOUDY (*)     Leukocyte Esterase, Urine TRACE (*)     All other components within normal limits   URINE CULTURE   ETHANOL   CK   CBC WITH AUTO DIFFERENTIAL   TSH WITHOUT REFLEX   URINE DRUG SCREEN   PREGNANCY, URINE   MICROSCOPIC URINALYSIS       All other labs were within normal range or not returned as of this dictation. EMERGENCY DEPARTMENT COURSE and DIFFERENTIAL DIAGNOSIS/MDM:   Vitals:    Vitals:    01/11/20 1212   BP: (!) 168/114   Pulse: 120   Resp: 18   Temp: 98 °F (36.7 °C)   TempSrc: Temporal   SpO2: 100%   Weight: 250 lb (113.4 kg)   Height: 5' 4\" (1.626 m)       Patient is medically cleared for psychiatric assessment. Patient was chemically restrained with Geodon Benadryl and Ativan for the protection of the staff and to minimize disruption by this patient to the other patients in the unit.     MDM      CRITICAL CARE TIME     CONSULTS:  None    PROCEDURES:  Unless otherwise noted below, none     Procedures    FINAL IMPRESSION      1.

## 2020-01-11 NOTE — ED NOTES
Pt is having an EKG completed on the pt and has been cooperative but remains asleep while the testing is being completed     Priscilla Garcia RN  01/11/20 9556

## 2020-01-11 NOTE — ED NOTES
This pt did accept the three injections into her left and right upper arm with a loud voice but did let the nursing staff medicate her ans security are here with her currently  Pt in bed area eating some of her lunch and drinking fluids     Cristhian Renee RN  01/11/20 2858

## 2020-01-11 NOTE — ED NOTES
Pt changed into [address] clothing observed for contraband. Pt refusing to provide urine at this time but verbalized understanding needs specimen for medical clearance.      Julissa Turner RN  01/11/20 4110
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
## 2020-01-11 NOTE — ED NOTES
Pt is in the bed area quiet and cooperative with no problems noted  Medication was effective no further loud or acting out behaviors expressed.   Pt is resting in bed quiet and cooperative with even respirations no problems or  C/O any kind expressed     Matt Alfred RN  01/11/20 8719

## 2020-01-12 PROCEDURE — 1240000000 HC EMOTIONAL WELLNESS R&B

## 2020-01-12 PROCEDURE — 6360000002 HC RX W HCPCS

## 2020-01-12 PROCEDURE — 6370000000 HC RX 637 (ALT 250 FOR IP): Performed by: PSYCHIATRY & NEUROLOGY

## 2020-01-12 PROCEDURE — 6370000000 HC RX 637 (ALT 250 FOR IP): Performed by: NURSE PRACTITIONER

## 2020-01-12 PROCEDURE — 93005 ELECTROCARDIOGRAM TRACING: CPT | Performed by: PSYCHIATRY & NEUROLOGY

## 2020-01-12 RX ORDER — LORAZEPAM 2 MG/ML
2 INJECTION INTRAMUSCULAR ONCE
Status: COMPLETED | OUTPATIENT
Start: 2020-01-12 | End: 2020-01-12

## 2020-01-12 RX ORDER — DIPHENHYDRAMINE HYDROCHLORIDE 50 MG/ML
INJECTION INTRAMUSCULAR; INTRAVENOUS
Status: COMPLETED
Start: 2020-01-12 | End: 2020-01-12

## 2020-01-12 RX ORDER — LORAZEPAM 2 MG/ML
INJECTION INTRAMUSCULAR
Status: COMPLETED
Start: 2020-01-12 | End: 2020-01-12

## 2020-01-12 RX ORDER — PRAVASTATIN SODIUM 10 MG
20 TABLET ORAL NIGHTLY
Status: DISCONTINUED | OUTPATIENT
Start: 2020-01-12 | End: 2020-01-21 | Stop reason: HOSPADM

## 2020-01-12 RX ORDER — DIPHENHYDRAMINE HYDROCHLORIDE 50 MG/ML
50 INJECTION INTRAMUSCULAR; INTRAVENOUS ONCE
Status: COMPLETED | OUTPATIENT
Start: 2020-01-12 | End: 2020-01-12

## 2020-01-12 RX ADMIN — DIPHENHYDRAMINE HYDROCHLORIDE 50 MG: 50 INJECTION, SOLUTION INTRAMUSCULAR; INTRAVENOUS at 09:58

## 2020-01-12 RX ADMIN — OXCARBAZEPINE 300 MG: 300 TABLET, FILM COATED ORAL at 09:01

## 2020-01-12 RX ADMIN — HYDROXYZINE PAMOATE 50 MG: 50 CAPSULE ORAL at 05:03

## 2020-01-12 RX ADMIN — LORAZEPAM 2 MG: 2 INJECTION INTRAMUSCULAR; INTRAVENOUS at 09:56

## 2020-01-12 RX ADMIN — ARIPIPRAZOLE 10 MG: 10 TABLET ORAL at 20:45

## 2020-01-12 RX ADMIN — PRAVASTATIN SODIUM 20 MG: 10 TABLET ORAL at 20:45

## 2020-01-12 RX ADMIN — LORAZEPAM 2 MG: 2 INJECTION INTRAMUSCULAR at 09:56

## 2020-01-12 RX ADMIN — DIPHENHYDRAMINE HYDROCHLORIDE 50 MG: 50 INJECTION INTRAMUSCULAR; INTRAVENOUS at 09:58

## 2020-01-12 RX ADMIN — HYDROXYZINE PAMOATE 50 MG: 50 CAPSULE ORAL at 20:44

## 2020-01-12 RX ADMIN — METOPROLOL TARTRATE 25 MG: 25 TABLET ORAL at 09:01

## 2020-01-12 RX ADMIN — OXCARBAZEPINE 300 MG: 300 TABLET, FILM COATED ORAL at 20:45

## 2020-01-12 RX ADMIN — ACETAMINOPHEN 650 MG: 325 TABLET ORAL at 05:03

## 2020-01-12 ASSESSMENT — PAIN SCALES - GENERAL: PAINLEVEL_OUTOF10: 4

## 2020-01-12 NOTE — PROGRESS NOTES
Pt. attended the 0900 community meeting.  Electronically signed by Mati Dawson on 1/12/2020 at 11:19 AM

## 2020-01-12 NOTE — PROGRESS NOTES
Report received from Adelina RN/ DENISE. Patient is 71 Female admitted under care of Dr. Toña Hawkins with Dx Bipolar Disorder. Patient pink slipped after her family reported patient was verbally aggressive with them on phone and patient was not taking her medications, they also stated she was paranoid. Patient presenting as hypomanic in ER and getting into verbal altercations with other patients. Pt medicated in ER. Precipitating stressors are patient cares for her daughter who has Alzheimer's and also has experienced recent losses. Medical Hx: HTN, IBS, Choley, and Hysterectomy. Labs WNL and Tox Screen Negative.

## 2020-01-12 NOTE — CONSULTS
[address] MEDICINE    HISTORY AND PHYSICAL EXAM    PATIENT NAME:  Juani Feldman    MRN:  [id number]  SERVICE DATE:  1/12/2020   SERVICE TIME:  12:13 PM    Primary Care Physician: Wilhemina Rubinstein, APRN - CNP         SUBJECTIVE  CHIEF COMPLAINT:  Medical clear for inpatient psychiatry admission. Consult for medical H/P encounter. HPI:  This is a 71 y.o. female admitted to  for violent outburst, agitation and maniac. She however denies nay SOB, CP, N/V, fever, chills, constipation or diarrhea.      PAST MEDICAL HISTORY:    Past Medical History:   Diagnosis Date    Allergic rhinitis     Bipolar 1 disorder ([facility] [address]     Depression     Hyperlipidemia     Hypertension     Irritable bowel syndrome     PTSD (post-traumatic stress disorder)      PAST SURGICAL HISTORY:    Past Surgical History:   Procedure Laterality Date    CHOLECYSTECTOMY      HYSTERECTOMY       FAMILY HISTORY:    Family History   Problem Relation Age of Onset    Cancer Father         Throat     SOCIAL HISTORY:    Social History     Socioeconomic History    Marital status:      Spouse name: Not on file    Number of children: Not on file    Years of education: Not on file    Highest education level: Not on file   Occupational History    Not on file   Social Needs    Financial resource strain: Not on file    Food insecurity:     Worry: Not on file     Inability: Not on file    Transportation needs:     Medical: Not on file     Non-medical: Not on file   Tobacco Use    Smoking status: Current Every Day Smoker     Packs/day: 1.00    Smokeless tobacco: Never Used    Tobacco comment: 1-2 ppd   Substance and Sexual Activity    Alcohol use: Yes     Comment: socially    Drug use: No    Sexual activity: Not on file   Lifestyle    Physical activity:     Days per week: Not on file     Minutes per session: Not on file    Stress: Not on file   Relationships    Social connections:     Talks on phone: Not on file     Gets together: Not on file     Attends Anabaptism service: Not on file     Active member of club or organization: Not on file     Attends meetings of clubs or organizations: Not on file     Relationship status: Not on file    Intimate partner violence:     Fear of current or ex partner: Not on file     Emotionally abused: Not on file     Physically abused: Not on file     Forced sexual activity: Not on file   Other Topics Concern    Not on file   Social History Narrative    Not on file     MEDICATIONS:    Current Facility-Administered Medications   Medication Dose Route Frequency Provider Last Rate Last Dose    nicotine (NICODERM CQ) 21 MG/24HR 1 patch  1 patch Transdermal Once Tracie Hartman MD   1 patch at 01/11/20 1430    ARIPiprazole (ABILIFY) tablet 10 mg  10 mg Oral Nightly Heidi Enriquez MD        fluticasone Toro Frederic) 50 MCG/ACT nasal spray 1 spray  1 spray Each Nostril Daily Heidi Enriquez MD        metoprolol tartrate (LOPRESSOR) tablet 25 mg  25 mg Oral Daily Heidi Enriquez MD   25 mg at 01/12/20 0901    OXcarbazepine (TRILEPTAL) tablet 300 mg  300 mg Oral BID Heidi Enriquez MD   300 mg at 01/12/20 0901    acetaminophen (TYLENOL) tablet 650 mg  650 mg Oral Q4H PRN Heidi Enriquez MD   650 mg at 01/12/20 0503    magnesium hydroxide (MILK OF MAGNESIA) 400 MG/5ML suspension 30 mL  30 mL Oral Daily PRN Heidi Enriquze MD        nicotine (NICODERM CQ) 21 MG/24HR 1 patch  1 patch Transdermal Daily Heidi Enriquez MD   1 patch at 01/12/20 0901    melatonin tablet 3 mg  3 mg Oral Nightly PRN Heidi Enriquez MD        hydrOXYzine (VISTARIL) capsule 50 mg  50 mg Oral Q6H PRN Heidi Enriquez MD   50 mg at 01/12/20 0503    Or    hydrOXYzine (VISTARIL) injection 50 mg  50 mg Intramuscular Q6H PRN Heidi Enriquez MD           ALLERGIES: Patient has no known allergies. REVIEW OF SYSTEM:   ROS as noted in HPI, 12 point ROS reviewed and otherwise negative.     OBJECTIVE  PHYSICAL EXAM: BP (!) 147/84 Comment: RN notified  Pulse 118   Temp 97 °F (36.1 °C) (Oral)   Resp 16   Ht 5' 4\" (1.626 m)   Wt 250 lb (113.4 kg)   LMP  (LMP Unknown)   SpO2 93%   BMI 42.91 kg/m²   CONSTITUTIONAL:  awake, alert, cooperative, no apparent distress, and appears stated age  EYES:  Lids and lashes normal, pupils equal, round and reactive to light, extra ocular muscles intact, sclera clear, conjunctiva normal  ENT:  Normocephalic, without obvious abnormality, atraumatic, sinuses nontender on palpation, external ears without lesions, oral pharynx with moist mucus membranes, tonsils without erythema or exudates, gums normal and good dentition. NECK:  Supple, symmetrical, trachea midline, no adenopathy, thyroid symmetric, not enlarged and no tenderness, skin normal  LUNGS:  No increased work of breathing, good air exchange, clear to auscultation bilaterally, no crackles or wheezing  CARDIOVASCULAR:  Normal apical impulse, regular rate and rhythm, normal S1 and S2, no S3 or S4, and no murmur noted  ABDOMEN:  No scars, normal bowel sounds, soft, non-distended, non-tender, no masses palpated, no hepatosplenomegally  MUSCULOSKELETAL:  There is no redness, warmth, or swelling of the joints. Full range of motion noted. Motor strength is 5 out of 5 all extremities bilaterally. Tone is normal.  NEUROLOGIC:  Awake, alert, oriented to name, place and time. Cranial nerves II-XII are grossly intact. Motor is 5 out of 5 bilaterally. Cerebellar finger to nose, heel to shin intact. Sensory is intact. Babinski down going, Romberg negative, and gait is normal.  SKIN:  no bruising or bleeding, normal skin color, texture, turgor, no redness, warmth, or swelling and no jaundice    DATA:     Diagnostic tests reviewed for today's visit:    Most recent labs and imaging results reviewed.        ASSESSMENT AND PLAN   Bipolar 1 disorder [facility]): continue current medication and management by psychiatrist    Encounter for other general exam     HTN: conitnue metoprolol and totrate per b/p reading     HLD: resume daily statin     VTE Prophylaxis: low risk, ambulation encouraged   Code status: full    This is only a history and physical examination and not medical management. The patient is to contact and follow up with their primary care physician and go over any abnormal labs, imaging, findings, medical concerns, or conditions that we have and have not addressed during this encounter.     Plan of care discussed with: patient    SIGNATURE: MAGALI Peng CNP  DATE: January 12, 2020  TIME: 12:13 PM
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
## 2020-01-12 NOTE — PROGRESS NOTES
Medicated with Vistaril and Tylenol.   Upset that she is in the hospital.  Feels like she was tricked to come to the hospital.

## 2020-01-12 NOTE — PROGRESS NOTES
Ct reports living in own home and had stopped taking medicine. Unable to care for sister when taking the medicine, reporting \"It is too much\". States, \"All I want to do is sleep and eat\". \"I do need the Celexa for depression but it is not ordered here\". Will speak to the psychiatrist tomorrow. Shares having a lot of family issues and has a .

## 2020-01-12 NOTE — PROGRESS NOTES
Pt. presents with rapid pressured speech. Tearful at times. Familiar with [facility] and this writer from past admissions. Reports many recent deaths in family and being overwhelmed with grief. Meds got mixed up and pt reached out to her brother for help and \"here I am, locked up and cant smoke. \"  vague with any other details of admission. 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 Reports many health issues and concerns. Poor appetite and poor sleep. Denies AH/VH and has no si/hi. Denies ETOH and does not smoke marijuana or use any other drugs.   Stressors include  1.recent deaths and grieving  2.can't smoke here  3.managing all  family members possessions  *sing karaoke and dance, go for drives, entertain and have parties  Electronically signed by JESSICA Melvin on 2020 at 8:21 AM
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
## 2020-01-12 NOTE — PROGRESS NOTES
Pt requests police visit. Notified Gabriel supervisor. [id number] [facility] police notified and will make a round up here.  Electronically signed by Sunitha Richardson RN on 1/12/2020 at 9:30 AM

## 2020-01-13 LAB — URINE CULTURE, ROUTINE: NORMAL

## 2020-01-13 PROCEDURE — 6370000000 HC RX 637 (ALT 250 FOR IP): Performed by: PSYCHIATRY & NEUROLOGY

## 2020-01-13 PROCEDURE — G0396 ALCOHOL/SUBS INTERV 15-30MN: HCPCS

## 2020-01-13 PROCEDURE — 99232 SBSQ HOSP IP/OBS MODERATE 35: CPT | Performed by: PSYCHIATRY & NEUROLOGY

## 2020-01-13 PROCEDURE — 93010 ELECTROCARDIOGRAM REPORT: CPT | Performed by: INTERNAL MEDICINE

## 2020-01-13 PROCEDURE — 6370000000 HC RX 637 (ALT 250 FOR IP): Performed by: NURSE PRACTITIONER

## 2020-01-13 PROCEDURE — 1240000000 HC EMOTIONAL WELLNESS R&B

## 2020-01-13 RX ORDER — HALOPERIDOL 5 MG/ML
5 INJECTION INTRAMUSCULAR EVERY 6 HOURS PRN
Status: DISCONTINUED | OUTPATIENT
Start: 2020-01-13 | End: 2020-01-21 | Stop reason: HOSPADM

## 2020-01-13 RX ORDER — HALOPERIDOL 5 MG
5 TABLET ORAL EVERY 6 HOURS PRN
Status: DISCONTINUED | OUTPATIENT
Start: 2020-01-13 | End: 2020-01-21 | Stop reason: HOSPADM

## 2020-01-13 RX ADMIN — PRAVASTATIN SODIUM 20 MG: 10 TABLET ORAL at 20:20

## 2020-01-13 RX ADMIN — ARIPIPRAZOLE 10 MG: 10 TABLET ORAL at 20:20

## 2020-01-13 RX ADMIN — MELATONIN 3 MG: at 20:20

## 2020-01-13 RX ADMIN — HYDROXYZINE PAMOATE 50 MG: 50 CAPSULE ORAL at 14:32

## 2020-01-13 RX ADMIN — HYDROXYZINE PAMOATE 50 MG: 50 CAPSULE ORAL at 06:13

## 2020-01-13 RX ADMIN — OXCARBAZEPINE 300 MG: 300 TABLET, FILM COATED ORAL at 20:20

## 2020-01-13 RX ADMIN — OXCARBAZEPINE 300 MG: 300 TABLET, FILM COATED ORAL at 11:04

## 2020-01-13 RX ADMIN — METOPROLOL TARTRATE 25 MG: 25 TABLET ORAL at 11:03

## 2020-01-13 RX ADMIN — HALOPERIDOL 5 MG: 5 TABLET ORAL at 14:32

## 2020-01-13 RX ADMIN — FLUTICASONE PROPIONATE 1 SPRAY: 50 SPRAY, METERED NASAL at 11:03

## 2020-01-13 ASSESSMENT — SLEEP AND FATIGUE QUESTIONNAIRES
DO YOU HAVE DIFFICULTY SLEEPING: YES
DIFFICULTY FALLING ASLEEP: NO
DO YOU USE A SLEEP AID: YES
DIFFICULTY STAYING ASLEEP: YES
RESTFUL SLEEP: NO
SLEEP PATTERN: DISTURBED/INTERRUPTED SLEEP
DIFFICULTY ARISING: NO

## 2020-01-13 ASSESSMENT — LIFESTYLE VARIABLES: HISTORY_ALCOHOL_USE: NO

## 2020-01-13 NOTE — PROGRESS NOTES
Pt. refused to attend the 1000 skills group, despite staff encouragement.  Electronically signed by Mj Chandra on 1/13/2020 at 1:23 PM

## 2020-01-13 NOTE — PROGRESS NOTES
Pt up to desk requesting to be \"knocked out. \" When asked why, pt states she is sick of this place. Pt states angrily \"I am manic depressive, have panic disorder, anxiety. I am going through withdrawal from cigarettes and caffeine. You won't give me coffee or pop. \" Explained to pt that she can have coffee or pop with meals and at snack time. Pt not due for PRN anxiety medication.  Pt let into shower upon request. Electronically signed by Avtar Kearney RN on 1/13/20 at 7:20 AM

## 2020-01-13 NOTE — PROGRESS NOTES
[facility] FOLLOW-UP NOTE     1/13/2020     Patient was seen and examined in person, Chart reviewed   Patient's case discussed with staff/team    Chief Complaint: angry psychosis    Interim History:     Pt is very angry and irritable  Demanding discharge  Was non compliant with medication  Pt is very labile and report that she is w/d from nicotine  Denies alcohol use  Racing thoughts, hyperactive  Minimizing all symptoms  Poor insight and Judgment  Appetite:   [] Normal/Unchanged  [] Increased  [x] Decreased      Sleep:       [] Normal/Unchanged  [] Fair       [x] Poor              Energy:    [] Normal/Unchanged  [] Increased  [x] Decreased        SI [] Present  [] Absent    HI  []Present  [] Absent     Aggression:  [x] yes  [] no    Patient is [] able  [x] unable to CONTRACT FOR SAFETY     PAST MEDICAL/PSYCHIATRIC HISTORY:   Past Medical History:   Diagnosis Date    Allergic rhinitis     Bipolar 1 disorder ([facility] [address]     Depression     Hyperlipidemia     Hypertension     Irritable bowel syndrome     PTSD (post-traumatic stress disorder)        FAMILY/SOCIAL HISTORY:  Family History   Problem Relation Age of Onset    Cancer Father         Throat     Social History     Socioeconomic History    Marital status:      Spouse name: Not on file    Number of children: Not on file    Years of education: Not on file    Highest education level: Not on file   Occupational History    Not on file   Social Needs    Financial resource strain: Not on file    Food insecurity:     Worry: Not on file     Inability: Not on file    Transportation needs:     Medical: Not on file     Non-medical: Not on file   Tobacco Use    Smoking status: Current Every Day Smoker     Packs/day: 1.00    Smokeless tobacco: Never Used    Tobacco comment: 1-2 ppd   Substance and Sexual Activity    Alcohol use: Yes     Comment: socially    Drug use: No    Sexual activity: Not on file   Lifestyle    Physical activity:

## 2020-01-13 NOTE — PROGRESS NOTES
Patient went over medication education paper with this nurse. Patient stated, \"I am also taking citalopram, 20 mg. If I take too much of it I get manic. If I don't take it, I get severely depressed. Maybe that's why I have been feeling depressed lately if I haven't been taking it. \" Patient is still focused on not being able to have pop, coffee, or cigarettes.

## 2020-01-13 NOTE — BH NOTE
Leidy Ruggiero has been labile and loud at times. She voices anger with being hospitalized and feels she is being kept here unjustly. She admits that she was going through a lot but felt that she was handling it. She reports multiple deaths recently, the most impactful being her sister who had pancreatic cancer. She also cares caring for a sister currently who has Alzheimer's. She reports she was depressed because of all she is going through. The final blow was learning that she was being \"catfished\" (decieved). She is upset with family for contributing to her admission. Her largest complaint was not having cigarettes, coffee or pop to the quantity she is used too. She also C/O feeling closed in. She also reports eating large quantities of food. She denies being suicidal or homicidal. She reports high anxiety. She asked to speak with and met with an advocate. She is disheveled and retreats to her room and saves snacks and beverages. She is evasive at times and demonstrated no obvious delusional thought to this writer.

## 2020-01-13 NOTE — PROGRESS NOTES
Pt accepted PRN vistaril for complaint of anxiety at 6395.  Electronically signed by Dariel Fischer RN on 1/13/20 at 6:48 AM

## 2020-01-14 PROCEDURE — 1240000000 HC EMOTIONAL WELLNESS R&B

## 2020-01-14 PROCEDURE — 6370000000 HC RX 637 (ALT 250 FOR IP): Performed by: PSYCHIATRY & NEUROLOGY

## 2020-01-14 PROCEDURE — 6370000000 HC RX 637 (ALT 250 FOR IP): Performed by: INTERNAL MEDICINE

## 2020-01-14 PROCEDURE — 99232 SBSQ HOSP IP/OBS MODERATE 35: CPT | Performed by: PSYCHIATRY & NEUROLOGY

## 2020-01-14 PROCEDURE — 6370000000 HC RX 637 (ALT 250 FOR IP): Performed by: NURSE PRACTITIONER

## 2020-01-14 RX ORDER — IBUPROFEN 400 MG/1
400 TABLET ORAL EVERY 6 HOURS PRN
Status: DISCONTINUED | OUTPATIENT
Start: 2020-01-14 | End: 2020-01-21 | Stop reason: HOSPADM

## 2020-01-14 RX ADMIN — ACETAMINOPHEN 650 MG: 325 TABLET ORAL at 20:59

## 2020-01-14 RX ADMIN — OXCARBAZEPINE 300 MG: 300 TABLET, FILM COATED ORAL at 21:00

## 2020-01-14 RX ADMIN — IBUPROFEN 400 MG: 400 TABLET, FILM COATED ORAL at 23:09

## 2020-01-14 RX ADMIN — OXCARBAZEPINE 300 MG: 300 TABLET, FILM COATED ORAL at 09:06

## 2020-01-14 RX ADMIN — HALOPERIDOL 5 MG: 5 TABLET ORAL at 10:29

## 2020-01-14 RX ADMIN — METOPROLOL TARTRATE 25 MG: 25 TABLET ORAL at 09:06

## 2020-01-14 RX ADMIN — ACETAMINOPHEN 650 MG: 325 TABLET ORAL at 13:56

## 2020-01-14 RX ADMIN — HYDROXYZINE PAMOATE 50 MG: 50 CAPSULE ORAL at 16:49

## 2020-01-14 RX ADMIN — PRAVASTATIN SODIUM 20 MG: 10 TABLET ORAL at 21:00

## 2020-01-14 RX ADMIN — HYDROXYZINE PAMOATE 50 MG: 50 CAPSULE ORAL at 09:46

## 2020-01-14 RX ADMIN — ARIPIPRAZOLE 10 MG: 10 TABLET ORAL at 21:00

## 2020-01-14 RX ADMIN — MELATONIN 3 MG: at 21:00

## 2020-01-14 RX ADMIN — FLUTICASONE PROPIONATE 1 SPRAY: 50 SPRAY, METERED NASAL at 09:05

## 2020-01-14 ASSESSMENT — PAIN SCALES - GENERAL
PAINLEVEL_OUTOF10: 5
PAINLEVEL_OUTOF10: 7
PAINLEVEL_OUTOF10: 2
PAINLEVEL_OUTOF10: 3
PAINLEVEL_OUTOF10: 8

## 2020-01-14 NOTE — PROGRESS NOTES
Pt expressed being upset agitated stated her insurance wont pay for her stay, all am meds were explained and given this am, pt is unsure if the trileptal is new, offered med print out, pt wondered if trileptal would help with her nerves, pt stated she would try it and wanted a vistaril if it doesn't work, vistaril 50mg was just given as pt is waiting to see the doctor.

## 2020-01-14 NOTE — PROGRESS NOTES
[facility] FOLLOW-UP NOTE     1/14/2020     Patient was seen and examined in person, Chart reviewed   Patient's case discussed with staff/team    Chief Complaint: Psychosis    Interim History:     Pt continue to remain angry and irritable  Disorganized thinking  Going through nicotine W/D  Has been smoking about 5 PPD  Pt lacks insight  Does not believe that she needed any treatment    Appetite:   [] Normal/Unchanged  [] Increased  [x] Decreased      Sleep:       [] Normal/Unchanged  [] Fair       [x] Poor              Energy:    [] Normal/Unchanged  [] Increased  [x] Decreased        SI [] Present  [] Absent    HI  []Present  [] Absent     Aggression:  [x] verbal- yes  [] no    Patient is [] able  [x] unable to CONTRACT FOR SAFETY     PAST MEDICAL/PSYCHIATRIC HISTORY:   Past Medical History:   Diagnosis Date    Allergic rhinitis     [facility]     Depression     Hyperlipidemia     Hypertension     Irritable bowel syndrome     PTSD (post-traumatic stress disorder)        FAMILY/SOCIAL HISTORY:  Family History   Problem Relation Age of Onset    Cancer Father         Throat     Social History     Socioeconomic History    Marital status:      Spouse name: Not on file    Number of children: Not on file    Years of education: Not on file    Highest education level: Not on file   Occupational History    Not on file   Social Needs    Financial resource strain: Not on file    Food insecurity:     Worry: Not on file     Inability: Not on file    Transportation needs:     Medical: Not on file     Non-medical: Not on file   Tobacco Use    Smoking status: Current Every Day Smoker     Packs/day: 1.00    Smokeless tobacco: Never Used    Tobacco comment: 1-2 ppd   Substance and Sexual Activity    Alcohol use: Yes     Comment: socially    Drug use: No    Sexual activity: Not on file   Lifestyle    Physical activity:     Days per week: Not on file     Minutes per session: Not on file    Stress: Not on file   Relationships    Social connections:     Talks on phone: Not on file     Gets together: Not on file     Attends Sikh service: Not on file     Active member of club or organization: Not on file     Attends meetings of clubs or organizations: Not on file     Relationship status: Not on file    Intimate partner violence:     Fear of current or ex partner: Not on file     Emotionally abused: Not on file     Physically abused: Not on file     Forced sexual activity: Not on file   Other Topics Concern    Not on file   Social History Narrative    Not on file           ROS:  [x] All negative/unchanged except if checked.  Explain positive(checked items) below:  [] Constitutional  [] Eyes  [] Ear/Nose/Mouth/Throat  [] Respiratory  [] CV  [] GI  []   [] Musculoskeletal  [] Skin/Breast  [] Neurological  [] Endocrine  [] Heme/Lymph  [] Allergic/Immunologic    Explanation:     MEDICATIONS:    Current Facility-Administered Medications:     haloperidol (HALDOL) tablet 5 mg, 5 mg, Oral, Q6H PRN, 5 mg at 01/14/20 1029 **OR** haloperidol lactate (HALDOL) injection 5 mg, 5 mg, Intramuscular, Q6H PRN, Arie Guo MD    pravastatin (PRAVACHOL) tablet 20 mg, 20 mg, Oral, Nightly, MAGALI Murray - CNP, 20 mg at 01/13/20 2020    ARIPiprazole (ABILIFY) tablet 10 mg, 10 mg, Oral, Nightly, Luci Gilbert MD, 10 mg at 01/13/20 2020    fluticasone (FLONASE) 50 MCG/ACT nasal spray 1 spray, 1 spray, Each Nostril, Daily, Luci Gilbert MD, 1 spray at 01/14/20 0905    metoprolol tartrate (LOPRESSOR) tablet 25 mg, 25 mg, Oral, Daily, Luci Gilbert MD, 25 mg at 01/14/20 7314    OXcarbazepine (TRILEPTAL) tablet 300 mg, 300 mg, Oral, BID, Luci Gilbert MD, 300 mg at 01/14/20 2908    acetaminophen (TYLENOL) tablet 650 mg, 650 mg, Oral, Q4H PRN, Luci Gilbert MD, 650 mg at 01/14/20 1356    magnesium hydroxide (MILK OF MAGNESIA) 400 MG/5ML suspension 30 mL, 30 mL, Oral, Daily PRN, Venecia Templeton No results found for: LITHIUM  Lab Results   Component Value Date    VALPROATE 53.3 06/30/2016       Treatment Plan:  Reviewed current Medications with the patient. Medication as ordered  Risks, benefits, side effects, drug-to-drug interactions and alternatives to treatment were discussed. Collateral information: pending  CD evaluation  Encourage patient to attend group and other milieu activities.   Discharge planning discussed with the patient and treatment team.    PSYCHOTHERAPY/COUNSELING:  [x] Therapeutic interview  [x] Supportive  [] CBT  [] Ongoing  [] Other    [x] Patient continues to need, on a daily basis, active treatment furnished directly by or requiring the supervision of inpatient psychiatric personnel      Anticipated Length of stay:            Electronically signed by Collette Aguayo MD on 1/14/2020 at 3:31 PM

## 2020-01-14 NOTE — GROUP NOTE
Group Therapy Note    Date: 1/13/2020    Group Start Time: 1900  Group End Time: 1945  Group Topic: Recreational    [facility]    Lucian Mclean        Group Therapy Note    Attendees: 7         Patient's Goal:  To participate in group activity. Notes:  Patient played game with the group.     Status After Intervention:  Unchanged    Participation Level: Interactive    Participation Quality: Appropriate and Attentive      Speech:  normal      Thought Process/Content: Linear      Affective Functioning: Flat      Mood: euthymic      Level of consciousness:  Alert and Attentive      Response to Learning: Progressing to goal      Endings: None Reported    Modes of Intervention: Activity      Discipline Responsible: [address], [employer]      Signature:  Lucian Mclean

## 2020-01-14 NOTE — PROGRESS NOTES
Pt expressed still feeling anxious about being on the unit, offered haldol 5mg for agitation and anxiety, encouraged pt to give new meds a chance to help her with her mood.

## 2020-01-14 NOTE — PROGRESS NOTES
Pt. declined to attend the 0900 community meeting, despite staff encouragement. Electronically signed by Chip Miller, [address] on 1/14/2020 at 11:24 AM

## 2020-01-15 PROCEDURE — 6370000000 HC RX 637 (ALT 250 FOR IP): Performed by: PSYCHIATRY & NEUROLOGY

## 2020-01-15 PROCEDURE — 6370000000 HC RX 637 (ALT 250 FOR IP): Performed by: INTERNAL MEDICINE

## 2020-01-15 PROCEDURE — 99232 SBSQ HOSP IP/OBS MODERATE 35: CPT | Performed by: PSYCHIATRY & NEUROLOGY

## 2020-01-15 PROCEDURE — 6370000000 HC RX 637 (ALT 250 FOR IP): Performed by: NURSE PRACTITIONER

## 2020-01-15 PROCEDURE — 1240000000 HC EMOTIONAL WELLNESS R&B

## 2020-01-15 RX ORDER — ARIPIPRAZOLE 15 MG/1
15 TABLET ORAL NIGHTLY
Status: DISCONTINUED | OUTPATIENT
Start: 2020-01-15 | End: 2020-01-21 | Stop reason: HOSPADM

## 2020-01-15 RX ADMIN — OXCARBAZEPINE 300 MG: 300 TABLET, FILM COATED ORAL at 20:57

## 2020-01-15 RX ADMIN — IBUPROFEN 400 MG: 400 TABLET, FILM COATED ORAL at 06:04

## 2020-01-15 RX ADMIN — PRAVASTATIN SODIUM 20 MG: 10 TABLET ORAL at 20:57

## 2020-01-15 RX ADMIN — HYDROXYZINE PAMOATE 50 MG: 50 CAPSULE ORAL at 00:10

## 2020-01-15 RX ADMIN — ARIPIPRAZOLE 15 MG: 15 TABLET ORAL at 20:57

## 2020-01-15 RX ADMIN — HYDROXYZINE PAMOATE 50 MG: 50 CAPSULE ORAL at 09:07

## 2020-01-15 RX ADMIN — MELATONIN 3 MG: at 20:57

## 2020-01-15 RX ADMIN — HALOPERIDOL 5 MG: 5 TABLET ORAL at 00:10

## 2020-01-15 RX ADMIN — ACETAMINOPHEN 650 MG: 325 TABLET ORAL at 02:24

## 2020-01-15 RX ADMIN — FLUTICASONE PROPIONATE 1 SPRAY: 50 SPRAY, METERED NASAL at 09:06

## 2020-01-15 RX ADMIN — IBUPROFEN 400 MG: 400 TABLET, FILM COATED ORAL at 21:00

## 2020-01-15 RX ADMIN — ACETAMINOPHEN 650 MG: 325 TABLET ORAL at 09:42

## 2020-01-15 RX ADMIN — METOPROLOL TARTRATE 25 MG: 25 TABLET ORAL at 09:07

## 2020-01-15 RX ADMIN — OXCARBAZEPINE 300 MG: 300 TABLET, FILM COATED ORAL at 09:06

## 2020-01-15 ASSESSMENT — PAIN SCALES - GENERAL
PAINLEVEL_OUTOF10: 8
PAINLEVEL_OUTOF10: 5
PAINLEVEL_OUTOF10: 6
PAINLEVEL_OUTOF10: 8

## 2020-01-15 NOTE — PROGRESS NOTES
Group Therapy Note    Date: 1/15/2020  Start Time: 1430  End Time:  0476    Number of Participants:6    Type of Group: Cognitive Skills    Patient's Goal:  To participate in mood management group. Notes: Patient declined to attend psychoeducation group at 1430 despite encouragement by staff.      Discipline Responsible: /Counselor    CHIQUITA Mccabe

## 2020-01-15 NOTE — PROGRESS NOTES
Pt out on unit and social with select peers, short time periods. Pt reports showering today. Pt presents with clean and well kept appearance. Pt reports good appetite, increasing  Pt reports poor sleep, due to trouble falling asleep and staying asleep. Pt states, preocupied with discharge, not smoking cigarettes, restless legs. Pt rates anxiety,4 /10. Pt rates depression,5 / 10. Pt reports attending groups. Pt denies SI, HI and A/V hallucinations. No voiced delusions at this time. Pt alert and oriented x 4. No s/s of distress noted. Will continue to monitor.

## 2020-01-15 NOTE — PROGRESS NOTES
Pt to desk, screaming toward staff \"He's a mother fucker. Fuck that doctor. Haul me off to long-term for threatening him. I'd rather be in long-term than be here\". Pt cursing Dr. Joni Franco and [facility]. Pt verbalizes that she will be filing a lawsuit d/t Dr. Joni Franco adjusting/ changing her medications. Pt offered and accepted willingly @ 0010 PRN vistaril 50 mg PO and PRN haldol 5 mg PO for anxiety and agitation. Pt continues yelling in dayroom for a few more moments before retreating to assigned room.

## 2020-01-15 NOTE — PROGRESS NOTES
Patient did not attend 1600 group despite staff encouragement. Electronically signed by Kaleb Walker on 1/15/2020 at 4:47 PM

## 2020-01-15 NOTE — PROGRESS NOTES
Pt. refused to attend the 1000 skills group, despite staff encouragement. Electronically signed by Kale Currie[address] on 1/15/2020 at 1:20 PM

## 2020-01-15 NOTE — PROGRESS NOTES
Pt. declined to attend the 0900 community meeting, despite staff encouragement. Electronically signed by Emeka Mendoza, [address] on 1/15/2020 at 9:51 AM

## 2020-01-15 NOTE — PROGRESS NOTES
[facility] FOLLOW-UP NOTE     1/15/2020     Patient was seen and examined in person, Chart reviewed   Patient's case discussed with staff/team    Chief Complaint: Psychosis    Interim History:   No change in presentation  Focused on getting discharged and getting celexa, which makes her happy\" manic\"  Pt continue to remain angry and irritable  Less Disorganized thinking  Pt lacks insight  Does not believe that she needed any treatment  Mood swings and labile  Pt got catfished for 10 days but she did not loose any money    Appetite:   [] Normal/Unchanged  [] Increased  [x] Decreased      Sleep:       [] Normal/Unchanged  [] Fair       [x] Poor              Energy:    [] Normal/Unchanged  [] Increased  [x] Decreased        SI [] Present  [] Absent    HI  []Present  [] Absent     Aggression:  [x] verbal- yes  [] no    Patient is [x] able  [] unable to CONTRACT FOR SAFETY     PAST MEDICAL/PSYCHIATRIC HISTORY:   Past Medical History:   Diagnosis Date    Allergic rhinitis     B[facility] 1 [facility] [address]     Depression     Hyperlipidemia     Hypertension     Irritable bowel syndrome     PTSD (post-traumatic stress disorder)        FAMILY/SOCIAL HISTORY:  Family History   Problem Relation Age of Onset    Cancer Father         Throat     Social History     Socioeconomic History    Marital status:      Spouse name: Not on file    Number of children: Not on file    Years of education: Not on file    Highest education level: Not on file   Occupational History    Not on file   Social Needs    Financial resource strain: Not on file    Food insecurity:     Worry: Not on file     Inability: Not on file    Transportation needs:     Medical: Not on file     Non-medical: Not on file   Tobacco Use    Smoking status: Current Every Day Smoker     Packs/day: 1.00    Smokeless tobacco: Never Used    Tobacco comment: 1-2 ppd   Substance and Sexual Activity    Alcohol use: Yes     Comment: socially    Drug

## 2020-01-15 NOTE — PROGRESS NOTES
Pt is resting in bed, awaken to take am meds , they were explained and pt took them, stated anxiety is a #5, and wanted vistaril for anxiety, 50 mg was given.   Pt was encouraged to take the emptied cups out of her room, and she removed most of them,

## 2020-01-15 NOTE — PROGRESS NOTES
Patient request Tylenol 650mg by mouth, C/O bilateral lower extremity pain @ 7.  Will continue to monitor

## 2020-01-16 PROBLEM — F25.8 OTHER SCHIZOAFFECTIVE DISORDERS (HCC): Status: ACTIVE | Noted: 2019-10-07

## 2020-01-16 PROCEDURE — 99232 SBSQ HOSP IP/OBS MODERATE 35: CPT | Performed by: PSYCHIATRY & NEUROLOGY

## 2020-01-16 PROCEDURE — 6370000000 HC RX 637 (ALT 250 FOR IP): Performed by: PSYCHIATRY & NEUROLOGY

## 2020-01-16 PROCEDURE — 6370000000 HC RX 637 (ALT 250 FOR IP): Performed by: NURSE PRACTITIONER

## 2020-01-16 PROCEDURE — 1240000000 HC EMOTIONAL WELLNESS R&B

## 2020-01-16 PROCEDURE — 6370000000 HC RX 637 (ALT 250 FOR IP): Performed by: PHYSICIAN ASSISTANT

## 2020-01-16 RX ORDER — CITALOPRAM 10 MG/1
10 TABLET ORAL DAILY
Status: DISCONTINUED | OUTPATIENT
Start: 2020-01-16 | End: 2020-01-21 | Stop reason: HOSPADM

## 2020-01-16 RX ORDER — LOPERAMIDE HYDROCHLORIDE 2 MG/1
2 CAPSULE ORAL 4 TIMES DAILY PRN
Status: DISCONTINUED | OUTPATIENT
Start: 2020-01-16 | End: 2020-01-21 | Stop reason: HOSPADM

## 2020-01-16 RX ADMIN — CITALOPRAM HYDROBROMIDE 10 MG: 10 TABLET ORAL at 11:04

## 2020-01-16 RX ADMIN — LOPERAMIDE HYDROCHLORIDE 2 MG: 2 CAPSULE ORAL at 18:55

## 2020-01-16 RX ADMIN — HYDROXYZINE PAMOATE 50 MG: 50 CAPSULE ORAL at 01:43

## 2020-01-16 RX ADMIN — METOPROLOL TARTRATE 25 MG: 25 TABLET ORAL at 10:26

## 2020-01-16 RX ADMIN — OXCARBAZEPINE 300 MG: 300 TABLET, FILM COATED ORAL at 21:31

## 2020-01-16 RX ADMIN — ARIPIPRAZOLE 15 MG: 15 TABLET ORAL at 21:31

## 2020-01-16 RX ADMIN — FLUTICASONE PROPIONATE 1 SPRAY: 50 SPRAY, METERED NASAL at 10:25

## 2020-01-16 RX ADMIN — OXCARBAZEPINE 300 MG: 300 TABLET, FILM COATED ORAL at 10:26

## 2020-01-16 RX ADMIN — HYDROXYZINE PAMOATE 50 MG: 50 CAPSULE ORAL at 10:26

## 2020-01-16 RX ADMIN — MELATONIN 3 MG: at 21:31

## 2020-01-16 RX ADMIN — PRAVASTATIN SODIUM 20 MG: 10 TABLET ORAL at 21:31

## 2020-01-16 NOTE — PROGRESS NOTES
Patient accepted PRN vistaril for complaint of anxiety.  Electronically signed by Izabela Calixto RN on 1/16/20 at 1:45 AM

## 2020-01-16 NOTE — PROGRESS NOTES
Pt. declined to attend the 0900 community meeting, despite staff encouragement. Electronically signed by Chip Miller, [facility] on 1/16/2020 at 10:29 AM

## 2020-01-16 NOTE — GROUP NOTE
Group Therapy Note    Date: 1/16/2020    Group Start Time: 1600  Group End Time: 1640  Group Topic: Healthy Living/Wellness    [facility]    Cosmo Christina        Group Therapy Note    Attendees: 10         Patient's Goal:  To learn about different kinds of coping skills. Notes:  Patient participated in group and learned about positive and negative coping skills.     Status After Intervention:  Unchanged    Participation Level: Minimal    Participation Quality: Attentive and Supportive      Speech:  hesitant      Thought Process/Content: Logical      Affective Functioning: Flat      Mood: euthymic      Level of consciousness:  Alert      Response to Learning: Able to verbalize current knowledge/experience      Endings: None Reported    Modes of Intervention: Education      Discipline Responsible: [address], [facility] [employer]      Signature:  Cosmo Christina

## 2020-01-16 NOTE — FLOWSHEET NOTE
Pt has been visible on unit. Labile mood. Tearful initially and angry/irritable towards end of interview. Feels trapped on unit. Believes the doctor is preventing her from seeing her own doctor. Denies SI but says if she left today the way she feels she would \"probably blow my brains out. \" Admits to mood swinging today. Anxiety is high Denies HI/AVH. Reports sleeping well and eating well. Occasionally comes to the desk to yell about the doctor. Easily redirected.

## 2020-01-16 NOTE — GROUP NOTE
Group Therapy Note    Date: 1/16/2020    Group Start Time: 1100  Group End Time: 1200  Group Topic: Psychotherapy    [facility] Gupta        Group Therapy Note    Attendees: 12         Patient's Goal:  Patient did not share goal with group    Notes:  Patient left group shortly after it began    Status After Intervention:  Unchanged    Participation Level: Minimal    Participation Quality: Resistant      Speech:  normal      Thought Process/Content: Logical      Affective Functioning: Congruent      Mood: quiet      Level of consciousness:  Alert and Oriented x4      Response to Learning: Able to verbalize current knowledge/experience      Endings: None Reported    Modes of Intervention: Support      Discipline Responsible: /Counselor      Signature:  Omer Gupta

## 2020-01-16 NOTE — PROGRESS NOTES
Patient did not attend 2100 wrap up group despite staff encouragement.  Electronically signed by Donn Nieves on 1/15/2020 at 9:29 PM

## 2020-01-16 NOTE — PROGRESS NOTES
[facility] FOLLOW-UP NOTE     1/16/2020     Patient was seen and examined in person, Chart reviewed   Patient's case discussed with staff/team    Chief Complaint: Psychosis    Interim History:     Pt continue to remain labile but less irritable  Pt is focused on getting her celexa back  Help with her panic feeling   Pt is agreeable to take mood stabilizer along with celexa  Pt is discharge focused  Racing thoughts  Sleep better last night    Appetite:   [] Normal/Unchanged  [] Increased  [x] Decreased      Sleep:       [] Normal/Unchanged  [] Fair       [x] Poor              Energy:    [] Normal/Unchanged  [] Increased  [x] Decreased        SI [] Present  [] Absent    HI  []Present  [] Absent     Aggression:  [x] verbal- yes  [] no    Patient is [x] able  [] unable to CONTRACT FOR SAFETY     PAST MEDICAL/PSYCHIATRIC HISTORY:   Past Medical History:   Diagnosis Date    Allergic rhinitis     Bipolar 1 disorder ([facility] [address]     Depression     Hyperlipidemia     Hypertension     Irritable bowel syndrome     PTSD (post-traumatic stress disorder)        FAMILY/SOCIAL HISTORY:  Family History   Problem Relation Age of Onset    Cancer Father         Throat     Social History     Socioeconomic History    Marital status:      Spouse name: Not on file    Number of children: Not on file    Years of education: Not on file    Highest education level: Not on file   Occupational History    Not on file   Social Needs    Financial resource strain: Not on file    Food insecurity:     Worry: Not on file     Inability: Not on file    Transportation needs:     Medical: Not on file     Non-medical: Not on file   Tobacco Use    Smoking status: Current Every Day Smoker     Packs/day: 1.00    Smokeless tobacco: Never Used    Tobacco comment: 1-2 ppd   Substance and Sexual Activity    Alcohol use: Yes     Comment: socially    Drug use: No    Sexual activity: Not on file   Lifestyle    Physical activity: Days per week: Not on file     Minutes per session: Not on file    Stress: Not on file   Relationships    Social connections:     Talks on phone: Not on file     Gets together: Not on file     Attends [employer] service: Not on file     Active member of club or organization: Not on file     Attends meetings of clubs or organizations: Not on file     Relationship status: Not on file    Intimate partner violence:     Fear of current or ex partner: Not on file     Emotionally abused: Not on file     Physically abused: Not on file     Forced sexual activity: Not on file   Other Topics Concern    Not on file   Social History Narrative    Not on file           ROS:  [x] All negative/unchanged except if checked.  Explain positive(checked items) below:  [] Constitutional  [] Eyes  [] Ear/Nose/Mouth/Throat  [] Respiratory  [] CV  [] GI  []   [] Musculoskeletal  [] Skin/Breast  [] Neurological  [] Endocrine  [] Heme/Lymph  [] Allergic/Immunologic    Explanation:     MEDICATIONS:    Current Facility-Administered Medications:     citalopram (CELEXA) tablet 10 mg, 10 mg, Oral, Daily, Kelley Eller MD    ARIPiprazole (ABILIFY) tablet 15 mg, 15 mg, Oral, Nightly, Kelley Eller MD, 15 mg at 01/15/20 2057    ibuprofen (ADVIL;MOTRIN) tablet 400 mg, 400 mg, Oral, Q6H PRN, [facility] DO, 400 mg at 01/15/20 2100    haloperidol (HALDOL) tablet 5 mg, 5 mg, Oral, Q6H PRN, 5 mg at 01/15/20 0010 **OR** haloperidol lactate (HALDOL) injection 5 mg, 5 mg, Intramuscular, Q6H PRN, Kelley Eller MD    pravastatin (PRAVACHOL) tablet 20 mg, 20 mg, Oral, Nightly, MAGALI Murray - CNP, 20 mg at 01/15/20 2057    fluticasone (FLONASE) 50 MCG/ACT nasal spray 1 spray, 1 spray, Each Nostril, Daily, Verona Brock MD, 1 spray at 01/16/20 1025    metoprolol tartrate (LOPRESSOR) tablet 25 mg, 25 mg, Oral, Daily, Verona Brock MD, 25 mg at 01/16/20 1026    OXcarbazepine (TRILEPTAL) tablet 300 mg, 300 mg, Oral, BID, Chaim Yao Luzma Dee MD, 300 mg at 01/16/20 1026    acetaminophen (TYLENOL) tablet 650 mg, 650 mg, Oral, Q4H PRN, Rigo Torres MD, 650 mg at 01/15/20 6634    magnesium hydroxide (MILK OF MAGNESIA) 400 MG/5ML suspension 30 mL, 30 mL, Oral, Daily PRN, Rigo Torres MD    nicotine (NICODERM CQ) 21 MG/24HR 1 patch, 1 patch, Transdermal, Daily, Rigo Torres MD, 1 patch at 01/16/20 1027    melatonin tablet 3 mg, 3 mg, Oral, Nightly PRN, Rigo Torres MD, 3 mg at 01/15/20 2057    hydrOXYzine (VISTARIL) capsule 50 mg, 50 mg, Oral, Q6H PRN, 50 mg at 01/16/20 1026 **OR** hydrOXYzine (VISTARIL) injection 50 mg, 50 mg, Intramuscular, Q6H PRN, Rigo Torres MD      Examination:  BP (!) 151/92 Comment: RN notified  Pulse 98   Temp 97 °F (36.1 °C) (Oral)   Resp 20   Ht 5' 4\" (1.626 m)   Wt 250 lb (113.4 kg)   LMP  (LMP Unknown)   SpO2 92%   BMI 42.91 kg/m²   Gait - steady  Medication side effects(SE): no    Mental Status Examination:    Level of consciousness:  within normal limits   Appearance:  poor grooming and poor hygiene  Behavior/Motor:  psychomotor agitation  Attitude toward examiner:  evasive  Speech:  rapid   Mood: irritable  Affect:  mood incongruent  Thought processes:  illogical   Thought content:  Delusions:  paranoid  Perceptual Disturbance:  auditory  Cognition:  oriented to person, place, and time   Concentration poor  Insight poor   Judgement poor     ASSESSMENT:   Patient symptoms are:  [] Well controlled  [] Improving  [] Worsening  [x] No change      Diagnosis:   Principal Problem:    Other schizoaffective disorders ([facility] [address]  Resolved Problems:    * No resolved hospital problems. *      LABS:    No results for input(s): WBC, HGB, PLT in the last 72 hours. No results for input(s): NA, K, CL, CO2, BUN, CREATININE, GLUCOSE in the last 72 hours. No results for input(s): BILITOT, ALKPHOS, AST, ALT in the last 72 hours.   Lab Results   Component Value Date    LABAMPH Neg 01/11/2020    VI Jimenez 01/11/2020    LABBENZ Neg 01/11/2020    LABMETH Neg 01/11/2020    OPIATESCREENURINE Neg 01/11/2020    PHENCYCLIDINESCREENURINE Neg 01/11/2020    ETOH <10 01/11/2020     Lab Results   Component Value Date    TSH 3.040 01/11/2020     No results found for: LITHIUM  Lab Results   Component Value Date    VALPROATE 53.3 06/30/2016       Treatment Plan:  Reviewed current Medications with the patient. Medication as ordered  Started Celexa 10 mg daily  Pt declined to sign voluntary and not stable to be discharged  Will complete probate papers for hearing  Risks, benefits, side effects, drug-to-drug interactions and alternatives to treatment were discussed. Collateral information: pending  CD evaluation  Encourage patient to attend group and other milieu activities.   Discharge planning discussed with the patient and treatment team.    PSYCHOTHERAPY/COUNSELING:  [x] Therapeutic interview  [x] Supportive  [] CBT  [] Ongoing  [] Other    [x] Patient continues to need, on a daily basis, active treatment furnished directly by or requiring the supervision of inpatient psychiatric personnel      Anticipated Length of stay:            Electronically signed by Meryle Lone, MD on 1/16/2020 at 10:55 AM

## 2020-01-16 NOTE — GROUP NOTE
Group Therapy Note    Date: 1/15/2020    Group Start Time: 1900  Group End Time: 1950  Group Topic: Recreational    [facility]    Isidoro Gowers        Group Therapy Note    Attendees: 10         Patient's Goal:  To participate in group activity. Notes:  Patient played game with the group.     Status After Intervention:  Improved    Participation Level: Interactive    Participation Quality: Appropriate and Attentive      Speech:  normal      Thought Process/Content: Logical      Affective Functioning: Flat      Mood: euthymic      Level of consciousness:  Alert and Attentive      Response to Learning: Progressing to goal      Endings: None Reported    Modes of Intervention: Activity      Discipline Responsible: [employer]      Signature:  Isidoro Gowers

## 2020-01-17 PROCEDURE — 1240000000 HC EMOTIONAL WELLNESS R&B

## 2020-01-17 PROCEDURE — 99232 SBSQ HOSP IP/OBS MODERATE 35: CPT | Performed by: PSYCHIATRY & NEUROLOGY

## 2020-01-17 PROCEDURE — 6370000000 HC RX 637 (ALT 250 FOR IP): Performed by: NURSE PRACTITIONER

## 2020-01-17 PROCEDURE — 6370000000 HC RX 637 (ALT 250 FOR IP): Performed by: PSYCHIATRY & NEUROLOGY

## 2020-01-17 PROCEDURE — 6370000000 HC RX 637 (ALT 250 FOR IP): Performed by: PHYSICIAN ASSISTANT

## 2020-01-17 RX ADMIN — FLUTICASONE PROPIONATE 1 SPRAY: 50 SPRAY, METERED NASAL at 09:03

## 2020-01-17 RX ADMIN — METOPROLOL TARTRATE 25 MG: 25 TABLET ORAL at 09:16

## 2020-01-17 RX ADMIN — MELATONIN 3 MG: at 21:35

## 2020-01-17 RX ADMIN — ACETAMINOPHEN 650 MG: 325 TABLET ORAL at 03:15

## 2020-01-17 RX ADMIN — HYDROXYZINE PAMOATE 50 MG: 50 CAPSULE ORAL at 03:15

## 2020-01-17 RX ADMIN — ARIPIPRAZOLE 15 MG: 15 TABLET ORAL at 21:35

## 2020-01-17 RX ADMIN — OXCARBAZEPINE 300 MG: 300 TABLET, FILM COATED ORAL at 21:35

## 2020-01-17 RX ADMIN — PRAVASTATIN SODIUM 20 MG: 10 TABLET ORAL at 21:35

## 2020-01-17 RX ADMIN — HYDROXYZINE PAMOATE 50 MG: 50 CAPSULE ORAL at 09:04

## 2020-01-17 RX ADMIN — OXCARBAZEPINE 300 MG: 300 TABLET, FILM COATED ORAL at 09:04

## 2020-01-17 RX ADMIN — CITALOPRAM HYDROBROMIDE 10 MG: 10 TABLET ORAL at 09:04

## 2020-01-17 RX ADMIN — LOPERAMIDE HYDROCHLORIDE 2 MG: 2 CAPSULE ORAL at 14:40

## 2020-01-17 ASSESSMENT — PAIN SCALES - GENERAL
PAINLEVEL_OUTOF10: 4
PAINLEVEL_OUTOF10: 2

## 2020-01-17 NOTE — GROUP NOTE
Group Therapy Note    Date: 1/17/2020    Group Start Time: 1000  Group End Time: 1100  Group Topic: Recreational    [facility]    Lizzy Johansen        Group Therapy Note    Attendees: 12         Patient's Goal: To go to groups. Notes:  Pt was attentive and socialized. Status After Intervention:  Improved    Participation Level: Active Listener    Participation Quality: Appropriate      Speech:  normal      Thought Process/Content: Logical      Affective Functioning: Congruent      Mood: calm      Level of consciousness:  Alert      Response to Learning: Able to verbalize current knowledge/experience      Endings: None Reported    Modes of Intervention: Activity      Discipline Responsible: PCA      Signature:   Jonathan Pleitez

## 2020-01-17 NOTE — PROGRESS NOTES
Pt requested and given prn vistaril for increased anxiety and prn tylenol for bilateral leg pain 4/10

## 2020-01-17 NOTE — PROGRESS NOTES
Patient did not attend Wrap-Up Group despite staff encouragement.  Electronically signed by Kaitlin Patel on 1/16/2020 at 9:16 PM

## 2020-01-17 NOTE — CARE COORDINATION
Brief Intervention and Referral to Treatment Summary    Patient was provided PHQ-9, AUDIT and DAST Screening:      PHQ-9 Score: NC  AUDIT Score:  0  DAST Score:  0    Patients substance use is considered     Low Risk/Healthy x  Moderate Risk  Harmful  Dependent    Patients depression is considered: NC    Minimal   Mild   Moderate  Moderately Severe  Severe    Brief Education Was Provided    Patient was receptive x  Patient was not receptive      Brief Intervention Is Provided (Only for AUDIT or DAST)     Patient reports readiness to decrease and/or stop use and a plan was discussed   Patient denies readiness to decrease and/or stop use and a plan was not discussed      Recommendations/Referrals for Brief and/or Specialized Treatment Provided to Patient   Patient denies use of alcohol or drugs. Patients tox was negative. Patient will follow up with [facility].    Electronically signed by Aron Fernando [facility] on 1/13/2020 at 12:21 PM
Patient did not attend group despite staff encouragement.   Electronically signed by Lydia Childs on 1/17/2020 at 11:51 AM
Probate request faxed to probate court
PM

## 2020-01-17 NOTE — GROUP NOTE
Group Therapy Note    Date: 1/16/2020    Group Start Time: 1900  Group End Time: 1950  Group Topic: Recreational    [facility]    Aleksandar Mohan        Group Therapy Note    Attendees: 13         Patient's Goal:  To participate in group recreational activity. Notes:  Patient presented to group late and left early but did engage with group while present.     Status After Intervention:  Improved    Participation Level: Minimal    Participation Quality: Appropriate      Speech:  normal      Thought Process/Content: Logical      Affective Functioning: Congruent      Mood: euthymic      Level of consciousness:  Alert      Response to Learning: Able to verbalize current knowledge/experience      Endings: None Reported    Modes of Intervention: Activity      Discipline Responsible: [address]      Signature:  Aleksandar Mohan

## 2020-01-17 NOTE — PROGRESS NOTES
Patient did not participate in [facility] despite staff encouragement.  Electronically signed by Mirna Duckworth on 1/17/2020 at 5:05 PM

## 2020-01-17 NOTE — PROGRESS NOTES
Pt resting in bed quietly reading a book. Pt denies depression, rates anxiety 1/10. Pt is pleasant and cooperative. Pt denies SI/HI or AVH. Pt reports eating well and fair sleep. Subjective:  As per nursing, no acute issues.    Vital Signs Last 24 Hrs  T(C): 36.9 (2018 08:00), Max: 37 (2018 05:00)  T(F): 98.4 (2018 08:00), Max: 98.6 (2018 05:00)  HR: 70 (2018 11:00) (58 - 80)  BP: --  BP(mean): --  RR: 18 (2018 11:00) (18 - 18)  SpO2: 100% (2018 11:00) (100% - 100%)    I&O's Summary  2018 07:  -  2018 07:00  --------------------------------------------------------  IN: 2531 mL / OUT: 3425 mL / NET: -894 mL    2018 07:01  -  2018 12:06  --------------------------------------------------------  IN: 305.2 mL / OUT: 323 mL / NET: -17.8 mL      Physical Exam:  General: Awake, NAD  Pulmonary: Intubated  Abdominal: soft, mildly distended  Neuro: Motor exam grossly intact in b/l UE and LE.     LABS:                        10.9   8.9   )-----------( 55       ( 2018 10:16 )             31.2     07-28    132<L>  |  94<L>  |  34<H>  ----------------------------<  149<H>  4.3   |  25  |  2.06<H>    Ca    8.9      2018 10:16  Phos  6.5       Mg     2.7         TPro  5.6<L>  /  Alb  3.1<L>  /  TBili  1.8<H>  /  DBili  0.4<H>  /  AST  84<H>  /  ALT  41  /  AlkPhos  70  07-28    PT/INR - ( 2018 10:16 )   PT: 12.1 sec;   INR: 1.09          PTT - ( 2018 10:16 )  PTT:31.3 sec  Urinalysis Basic - ( 2018 16:45 )    Color: Yellow / Appearance: Clear / S.015 / pH: x  Gluc: x / Ketone: NEGATIVE  / Bili: Negative / Urobili: 0.2 E.U./dL   Blood: x / Protein: 100 mg/dL / Nitrite: NEGATIVE   Leuk Esterase: NEGATIVE / RBC: > 10 /HPF / WBC < 5 /HPF   Sq Epi: x / Non Sq Epi: 0-5 /HPF / Bacteria: Present /HPF      LIVER FUNCTIONS - ( 2018 10:16 )  Alb: 3.1 g/dL / Pro: 5.6 g/dL / ALK PHOS: 70 U/L / ALT: 41 U/L / AST: 84 U/L / GGT: x           CAPILLARY BLOOD GLUCOSE      POCT Blood Glucose.: 143 mg/dL (2018 10:13)  POCT Blood Glucose.: 107 mg/dL (2018 05:49)  POCT Blood Glucose.: 110 mg/dL (2018 00:53)  POCT Blood Glucose.: 129 mg/dL (2018 21:37)  POCT Blood Glucose.: 122 mg/dL (2018 18:29)  POCT Blood Glucose.: 106 mg/dL (2018 15:59)  POCT Blood Glucose.: 120 mg/dL (2018 13:24)    70yo F s/p Thoracoadominal aortic aneurysm repair on 18    - As per Dr. Burch and Dr. Monson, keep MAP >110, Spinal drain @ 15ml/hr  - Frequent neuro checks

## 2020-01-17 NOTE — PROGRESS NOTES
[facility] FOLLOW-UP NOTE     1/17/2020     Patient was seen and examined in person, Chart reviewed   Patient's case discussed with staff/team    Chief Complaint: Psychosis    Interim History:     Pt report feeling less depressed and anxious  Feel Celexa is starting to help her  Pt is less irritable and angry  Does not want to be probated  Willing to take depot medication  Denies active SI or HI  Denies hopeless and worthless feeling  Sleep has been disturbed  Appetite:   [] Normal/Unchanged  [] Increased  [x] Decreased      Sleep:       [] Normal/Unchanged  [] Fair       [x] Poor              Energy:    [] Normal/Unchanged  [] Increased  [x] Decreased        SI [] Present  [] Absent    HI  []Present  [] Absent     Aggression:  [x] verbal- yes  [] no    Patient is [x] able  [] unable to CONTRACT FOR SAFETY     PAST MEDICAL/PSYCHIATRIC HISTORY:   Past Medical History:   Diagnosis Date    Allergic rhinitis     Bipolar 1 disorder ([facility] [address]     Depression     Hyperlipidemia     Hypertension     Irritable bowel syndrome     PTSD (post-traumatic stress disorder)        FAMILY/SOCIAL HISTORY:  Family History   Problem Relation Age of Onset    Cancer Father         Throat     Social History     Socioeconomic History    Marital status:      Spouse name: Not on file    Number of children: Not on file    Years of education: Not on file    Highest education level: Not on file   Occupational History    Not on file   Social Needs    Financial resource strain: Not on file    Food insecurity:     Worry: Not on file     Inability: Not on file    Transportation needs:     Medical: Not on file     Non-medical: Not on file   Tobacco Use    Smoking status: Current Every Day Smoker     Packs/day: 1.00    Smokeless tobacco: Never Used    Tobacco comment: 1-2 ppd   Substance and Sexual Activity    Alcohol use: Yes     Comment: socially    Drug use: No    Sexual activity: Not on file   Lifestyle   

## 2020-01-18 PROCEDURE — 1240000000 HC EMOTIONAL WELLNESS R&B

## 2020-01-18 PROCEDURE — 6370000000 HC RX 637 (ALT 250 FOR IP): Performed by: PSYCHIATRY & NEUROLOGY

## 2020-01-18 PROCEDURE — 6370000000 HC RX 637 (ALT 250 FOR IP): Performed by: NURSE PRACTITIONER

## 2020-01-18 RX ADMIN — MELATONIN 3 MG: at 21:40

## 2020-01-18 RX ADMIN — CITALOPRAM HYDROBROMIDE 10 MG: 10 TABLET ORAL at 08:56

## 2020-01-18 RX ADMIN — METOPROLOL TARTRATE 25 MG: 25 TABLET ORAL at 08:56

## 2020-01-18 RX ADMIN — OXCARBAZEPINE 300 MG: 300 TABLET, FILM COATED ORAL at 21:40

## 2020-01-18 RX ADMIN — PRAVASTATIN SODIUM 20 MG: 10 TABLET ORAL at 21:41

## 2020-01-18 RX ADMIN — OXCARBAZEPINE 300 MG: 300 TABLET, FILM COATED ORAL at 08:56

## 2020-01-18 RX ADMIN — ARIPIPRAZOLE 15 MG: 15 TABLET ORAL at 21:40

## 2020-01-18 NOTE — PROGRESS NOTES
Patient did not attend wrap up group despite staff encouragement.  Electronically signed by Patricia Davidson on 1/17/2020 at 10:29 PM

## 2020-01-18 NOTE — PROGRESS NOTES
Pt. declined to attend the 0900 community meeting, despite staff encouragement. Electronically signed by TNT Crowd, [address] on 1/18/2020 at 10:44 AM

## 2020-01-18 NOTE — GROUP NOTE
Group Therapy Note    Date: 1/18/2020    Group Start Time: 1100  Group End Time: 1200  Group Topic: Psychotherapy    [facility] Watson        Group Therapy Note    Attendees: 12         Patient's Goal:  To participate in group therapy process    Notes:  Patient was supportive of others in the group    Status After Intervention:  Improved    Participation Level: Interactive    Participation Quality: Appropriate      Speech:  normal      Thought Process/Content: Logical      Affective Functioning: Congruent      Mood: euthymic      Level of consciousness:  Alert and Oriented x4      Response to Learning: Able to verbalize current knowledge/experience      Endings: None Reported    Modes of Intervention: Support      Discipline Responsible: /Counselor      Signature:  Bhupendra Watson

## 2020-01-18 NOTE — PROGRESS NOTES
Pt. refused to attend the 1000 skills group, despite staff encouragement. Electronically signed by Eva Yip, [address] on 1/18/2020 at 11:29 AM

## 2020-01-18 NOTE — PROGRESS NOTES
tartrate (LOPRESSOR) tablet 25 mg, 25 mg, Oral, Daily, Bard Vitor MD, 25 mg at 01/18/20 0856    OXcarbazepine (TRILEPTAL) tablet 300 mg, 300 mg, Oral, BID, Bard Vitor MD, 300 mg at 01/18/20 0856    acetaminophen (TYLENOL) tablet 650 mg, 650 mg, Oral, Q4H PRN, Bard Vitor MD, 650 mg at 01/17/20 0315    magnesium hydroxide (MILK OF MAGNESIA) 400 MG/5ML suspension 30 mL, 30 mL, Oral, Daily PRN, Bard Vitor MD    nicotine (NICODERM CQ) 21 MG/24HR 1 patch, 1 patch, Transdermal, Daily, Bard Vitor MD, 1 patch at 01/18/20 0856    melatonin tablet 3 mg, 3 mg, Oral, Nightly PRN, Bard Vitor MD, 3 mg at 01/17/20 2135    hydrOXYzine (VISTARIL) capsule 50 mg, 50 mg, Oral, Q6H PRN, 50 mg at 01/17/20 0904 **OR** hydrOXYzine (VISTARIL) injection 50 mg, 50 mg, Intramuscular, Q6H PRN, Bard Vitor MD      Examination:  /82   Pulse 96   Temp 97.5 °F (36.4 °C) (Oral)   Resp 14   Ht 5' 4\" (1.626 m)   Wt 250 lb (113.4 kg)   LMP  (LMP Unknown)   SpO2 96%   BMI 42.91 kg/m²   Gait - steady  Medication side effects(SE): no    Mental Status Examination:    Level of consciousness:  within normal limits   Appearance:  poor grooming and poor hygiene  Behavior/Motor:  psychomotor agitation  Attitude toward examiner:  evasive  Speech:  rapid   Mood: irritable  Affect:  mood incongruent  Thought processes:  illogical   Thought content:  Delusions:  Less paranoid  Perceptual Disturbance:  auditory  Cognition:  oriented to person, place, and time   Concentration poor  Insight poor   Judgement poor     ASSESSMENT:   Patient symptoms are:  [] Well controlled  [x] Improving  [] Worsening  [] No change      Diagnosis:   Principal Problem:    Other schizoaffective disorders ([facility] [address]  Resolved Problems:    * No resolved hospital problems. *      LABS:    No results for input(s): WBC, HGB, PLT in the last 72 hours. No results for input(s): NA, K, CL, CO2, BUN, CREATININE, GLUCOSE in the last 72 hours.   No

## 2020-01-19 PROCEDURE — 6370000000 HC RX 637 (ALT 250 FOR IP): Performed by: PSYCHIATRY & NEUROLOGY

## 2020-01-19 PROCEDURE — 1240000000 HC EMOTIONAL WELLNESS R&B

## 2020-01-19 PROCEDURE — 6370000000 HC RX 637 (ALT 250 FOR IP): Performed by: NURSE PRACTITIONER

## 2020-01-19 RX ADMIN — FLUTICASONE PROPIONATE 1 SPRAY: 50 SPRAY, METERED NASAL at 09:41

## 2020-01-19 RX ADMIN — CITALOPRAM HYDROBROMIDE 10 MG: 10 TABLET ORAL at 09:40

## 2020-01-19 RX ADMIN — OXCARBAZEPINE 300 MG: 300 TABLET, FILM COATED ORAL at 20:56

## 2020-01-19 RX ADMIN — HYDROXYZINE PAMOATE 50 MG: 50 CAPSULE ORAL at 10:55

## 2020-01-19 RX ADMIN — PRAVASTATIN SODIUM 20 MG: 10 TABLET ORAL at 20:56

## 2020-01-19 RX ADMIN — METOPROLOL TARTRATE 25 MG: 25 TABLET ORAL at 09:40

## 2020-01-19 RX ADMIN — ARIPIPRAZOLE 15 MG: 15 TABLET ORAL at 20:56

## 2020-01-19 RX ADMIN — OXCARBAZEPINE 300 MG: 300 TABLET, FILM COATED ORAL at 09:40

## 2020-01-19 RX ADMIN — MELATONIN 3 MG: at 20:56

## 2020-01-19 NOTE — PROGRESS NOTES
[facility] FOLLOW-UP NOTE     1/19/2020     Patient was seen and examined in person, Chart reviewed   Patient's case discussed with staff/team    Chief Complaint: Psychosis    Interim History:       Pt continue to do better  Mood better  Agree to take the abilify shot  No agitation  Less irritable  Sleep better  Appetite:   [] Normal/Unchanged  [] Increased  [x] Decreased      Sleep:       [] Normal/Unchanged  [x] Fair       [] Poor              Energy:    [] Normal/Unchanged  [] Increased  [x] Decreased        SI [] Present  [] Absent    HI  []Present  [] Absent     Aggression:  [x] verbal- yes  [] no    Patient is [x] able  [] unable to CONTRACT FOR SAFETY     PAST MEDICAL/PSYCHIATRIC HISTORY:   Past Medical History:   Diagnosis Date    Allergic rhinitis     Bipolar 1 disorder ([facility] [address]     Depression     Hyperlipidemia     Hypertension     Irritable bowel syndrome     PTSD (post-traumatic stress disorder)        FAMILY/SOCIAL HISTORY:  Family History   Problem Relation Age of Onset    Cancer Father         Throat     Social History     Socioeconomic History    Marital status:      Spouse name: Not on file    Number of children: Not on file    Years of education: Not on file    Highest education level: Not on file   Occupational History    Not on file   Social Needs    Financial resource strain: Not on file    Food insecurity:     Worry: Not on file     Inability: Not on file    Transportation needs:     Medical: Not on file     Non-medical: Not on file   Tobacco Use    Smoking status: Current Every Day Smoker     Packs/day: 1.00    Smokeless tobacco: Never Used    Tobacco comment: 1-2 ppd   Substance and Sexual Activity    Alcohol use: Yes     Comment: socially    Drug use: No    Sexual activity: Not on file   Lifestyle    Physical activity:     Days per week: Not on file     Minutes per session: Not on file    Stress: Not on file   Relationships    Social connections: Talks on phone: Not on file     Gets together: Not on file     Attends Latter day service: Not on file     Active member of club or organization: Not on file     Attends meetings of clubs or organizations: Not on file     Relationship status: Not on file    Intimate partner violence:     Fear of current or ex partner: Not on file     Emotionally abused: Not on file     Physically abused: Not on file     Forced sexual activity: Not on file   Other Topics Concern    Not on file   Social History Narrative    Not on file           ROS:  [x] All negative/unchanged except if checked.  Explain positive(checked items) below:  [] Constitutional  [] Eyes  [] Ear/Nose/Mouth/Throat  [] Respiratory  [] CV  [] GI  []   [] Musculoskeletal  [] Skin/Breast  [] Neurological  [] Endocrine  [] Heme/Lymph  [] Allergic/Immunologic    Explanation:     MEDICATIONS:    Current Facility-Administered Medications:     citalopram (CELEXA) tablet 10 mg, 10 mg, Oral, Daily, Suzan Bansal MD, 10 mg at 01/19/20 0940    loperamide (IMODIUM) capsule 2 mg, 2 mg, Oral, 4x Daily PRN, BEREKET Santos, 2 mg at 01/17/20 1440    ARIPiprazole (ABILIFY) tablet 15 mg, 15 mg, Oral, Nightly, Suzan Bansal MD, 15 mg at 01/18/20 2140    ibuprofen (ADVIL;MOTRIN) tablet 400 mg, 400 mg, Oral, Q6H PRN, Jonathan Luna DO, 400 mg at 01/15/20 2100    haloperidol (HALDOL) tablet 5 mg, 5 mg, Oral, Q6H PRN, 5 mg at 01/15/20 0010 **OR** haloperidol lactate (HALDOL) injection 5 mg, 5 mg, Intramuscular, Q6H PRN, Suzan Bansal MD    pravastatin (PRAVACHOL) tablet 20 mg, 20 mg, Oral, Nightly, MAGALI Murray CNP, 20 mg at 01/18/20 2141    fluticasone (FLONASE) 50 MCG/ACT nasal spray 1 spray, 1 spray, Each Nostril, Daily, Lee Dutton MD, 1 spray at 01/19/20 0941    metoprolol tartrate (LOPRESSOR) tablet 25 mg, 25 mg, Oral, Daily, Lee Dutton MD, 25 mg at 01/19/20 0940    OXcarbazepine (TRILEPTAL) tablet 300 mg, 300 mg, Oral, BID, Antonio Escalera MD, 300 mg at 01/19/20 0940    acetaminophen (TYLENOL) tablet 650 mg, 650 mg, Oral, Q4H PRN, Antonio Escalera MD, 650 mg at 01/17/20 0315    magnesium hydroxide (MILK OF MAGNESIA) 400 MG/5ML suspension 30 mL, 30 mL, Oral, Daily PRN, Antonio Escalera MD    nicotine (NICODERM CQ) 21 MG/24HR 1 patch, 1 patch, Transdermal, Daily, Antonio Escalera MD, 1 patch at 01/19/20 0940    melatonin tablet 3 mg, 3 mg, Oral, Nightly PRN, Antonio Escalera MD, 3 mg at 01/18/20 2140    hydrOXYzine (VISTARIL) capsule 50 mg, 50 mg, Oral, Q6H PRN, 50 mg at 01/17/20 0904 **OR** hydrOXYzine (VISTARIL) injection 50 mg, 50 mg, Intramuscular, Q6H PRN, Antonio Escalera MD      Examination:  BP (!) 146/67   Pulse 97   Temp 97.8 °F (36.6 °C) (Oral)   Resp 18   Ht 5' 4\" (1.626 m)   Wt 250 lb (113.4 kg)   LMP  (LMP Unknown)   SpO2 97%   BMI 42.91 kg/m²   Gait - steady  Medication side effects(SE): no    Mental Status Examination:    Level of consciousness:  within normal limits   Appearance:  poor grooming and poor hygiene  Behavior/Motor:  psychomotor agitation  Attitude toward examiner:  evasive  Speech:  rapid   Mood: irritable  Affect:  mood incongruent  Thought processes:  illogical   Thought content:  Delusions:  Less paranoid  Perceptual Disturbance:  auditory  Cognition:  oriented to person, place, and time   Concentration poor  Insight poor   Judgement poor     ASSESSMENT:   Patient symptoms are:  [] Well controlled  [x] Improving  [] Worsening  [] No change      Diagnosis:   Principal Problem:    Other schizoaffective disorders ([facility] [address]  Resolved Problems:    * No resolved hospital problems. *      LABS:    No results for input(s): WBC, HGB, PLT in the last 72 hours. No results for input(s): NA, K, CL, CO2, BUN, CREATININE, GLUCOSE in the last 72 hours. No results for input(s): BILITOT, ALKPHOS, AST, ALT in the last 72 hours.   Lab Results   Component Value Date    LABAMPH Neg 01/11/2020    ROLANNU Neg 01/11/2020 LABBENZ Neg 01/11/2020    LABMETH Neg 01/11/2020    OPIATESCREENURINE Neg 01/11/2020    PHENCYCLIDINESCREENURINE Neg 01/11/2020    ETOH <10 01/11/2020     Lab Results   Component Value Date    TSH 3.040 01/11/2020     No results found for: LITHIUM  Lab Results   Component Value Date    VALPROATE 53.3 06/30/2016       Treatment Plan:  Reviewed current Medications with the patient. Medication as ordered. abilify maintena - 300 mg IM today  Risks, benefits, side effects, drug-to-drug interactions and alternatives to treatment were discussed. Collateral information: pending  CD evaluation  Encourage patient to attend group and other milieu activities.   Discharge planning discussed with the patient and treatment team.    PSYCHOTHERAPY/COUNSELING:  [x] Therapeutic interview  [x] Supportive  [] CBT  [] Ongoing  [] Other    [x] Patient continues to need, on a daily basis, active treatment furnished directly by or requiring the supervision of inpatient psychiatric personnel      Anticipated Length of stay:            Electronically signed by Lazara Carey MD on 1/19/2020 at 10:46 AM

## 2020-01-19 NOTE — GROUP NOTE
Group Therapy Note    Date: 1/19/2020    Group Start Time: 1110  Group End Time: 1150  Group Topic: Cognitive Skills    [facility]    CHIQUITA Gale        Group Therapy Note    Attendees: 8         Patient's Goal:  To participate in mood management group. Notes:  Patient learned about the five basic human needs. Status After Intervention:  Improved    Participation Level: Active Listener    Participation Quality: Appropriate      Speech:  normal      Thought Process/Content: Logical      Affective Functioning: Congruent      Mood: elevated      Level of consciousness:  Alert      Response to Learning: Able to verbalize current knowledge/experience      Endings: None Reported    Modes of Intervention: Education      Discipline Responsible: /Counselor      Signature:   CHIQUITA Gale

## 2020-01-19 NOTE — GROUP NOTE
Group Therapy Note    Date: 1/19/2020    Group Start Time: 1000  Group End Time: 1100  Group Topic: Recreational    [facility]    Lizzy Johansen        Group Therapy Note    Attendees: 10         Patient's Goal:  To get over anger    Notes:  Pt as attentive and enjoyed group    Status After Intervention:  Improved    Participation Level: Interactive    Participation Quality: Sharing      Speech:  normal      Thought Process/Content: Logical      Affective Functioning: Congruent      Mood: calm      Level of consciousness:  Alert      Response to Learning: Able to verbalize current knowledge/experience      Endings: None Reported    Modes of Intervention: Activity      Discipline Responsible: KEVIN      Signature:   Shilpa Cabral

## 2020-01-19 NOTE — BH NOTE
Patient presents neat and more calm. She expressed feeling like her thoughts are more logical and less racing. She speaks at length about family members and concerns about inheritance and housing. She wonders how much longer she can care for her sister with dementia. No SI, HI,or hallucinations. Glad to feel much less angry.

## 2020-01-19 NOTE — PROGRESS NOTES
Pt out on unit and social with peers, observed playing cards, with select peers. Voiced less anxiety depression, focus on discharge, voiced taking of of her sister. Pt reports showering today. Pt presents with clean and well kept appearance  Pt reports good appetite. Pt reports good sleep, with medication  Pt rates anxiety,4/10. Pt rates depression,3 / 10. Pt reports attending groups. Pt denies SI, HI and A/V hallucinations. No voiced delusions at this time. Pt alert and oriented x 4. Pt calm and cooperative  Pt reports medications are, \" \"effective   Will continue to monitor.

## 2020-01-20 PROCEDURE — 6370000000 HC RX 637 (ALT 250 FOR IP): Performed by: NURSE PRACTITIONER

## 2020-01-20 PROCEDURE — 99231 SBSQ HOSP IP/OBS SF/LOW 25: CPT | Performed by: PSYCHIATRY & NEUROLOGY

## 2020-01-20 PROCEDURE — 6370000000 HC RX 637 (ALT 250 FOR IP): Performed by: PHYSICIAN ASSISTANT

## 2020-01-20 PROCEDURE — 6370000000 HC RX 637 (ALT 250 FOR IP): Performed by: PSYCHIATRY & NEUROLOGY

## 2020-01-20 PROCEDURE — 90833 PSYTX W PT W E/M 30 MIN: CPT | Performed by: PSYCHIATRY & NEUROLOGY

## 2020-01-20 PROCEDURE — 1240000000 HC EMOTIONAL WELLNESS R&B

## 2020-01-20 PROCEDURE — 6370000000 HC RX 637 (ALT 250 FOR IP): Performed by: INTERNAL MEDICINE

## 2020-01-20 RX ADMIN — OXCARBAZEPINE 300 MG: 300 TABLET, FILM COATED ORAL at 08:33

## 2020-01-20 RX ADMIN — CITALOPRAM HYDROBROMIDE 10 MG: 10 TABLET ORAL at 08:31

## 2020-01-20 RX ADMIN — PRAVASTATIN SODIUM 20 MG: 10 TABLET ORAL at 20:22

## 2020-01-20 RX ADMIN — IBUPROFEN 400 MG: 400 TABLET, FILM COATED ORAL at 20:23

## 2020-01-20 RX ADMIN — LOPERAMIDE HYDROCHLORIDE 2 MG: 2 CAPSULE ORAL at 20:26

## 2020-01-20 RX ADMIN — OXCARBAZEPINE 300 MG: 300 TABLET, FILM COATED ORAL at 20:23

## 2020-01-20 RX ADMIN — ARIPIPRAZOLE 15 MG: 15 TABLET ORAL at 20:22

## 2020-01-20 RX ADMIN — METOPROLOL TARTRATE 25 MG: 25 TABLET ORAL at 08:42

## 2020-01-20 RX ADMIN — MELATONIN 3 MG: at 20:23

## 2020-01-20 ASSESSMENT — PAIN SCALES - GENERAL: PAINLEVEL_OUTOF10: 3

## 2020-01-20 NOTE — GROUP NOTE
Group Therapy Note    Date: 1/20/2020    Group Start Time: 0  Group End Time: 1200  Group Topic: Psychotherapy    [facility]    Aron [employer]        Group Therapy Note    Attendees: 10         Patient's Goal:  Take care of self    Notes:  Patient was very responsive to other group members    Status After Intervention:  Improved    Participation Level: Interactive    Participation Quality: Appropriate      Speech:  normal      Thought Process/Content: Logical      Affective Functioning: Congruent      Mood: anxious      Level of consciousness:  Alert      Response to Learning: Progressing to goal      Endings: None Reported    Modes of Intervention: Support      Discipline Responsible: /Counselor      Signature:  Aron Fernando [employer]

## 2020-01-20 NOTE — GROUP NOTE
Group Therapy Note    Date: 1/20/2020    Group Start Time: 1000  Group End Time: 1100  Group Topic: Recreational    [facility]    Lizzy Johansen        Group Therapy Note    Attendees: 10         Patient's Goal:  To go home    Notes:  Pt was engaged in activity    Status After Intervention:  Unchanged    Participation Level: Active Listener    Participation Quality: Sharing      Speech:  normal      Thought Process/Content: Logical      Affective Functioning: Congruent      Mood: calm      Level of consciousness:  Alert      Response to Learning: Able to verbalize current knowledge/experience      Endings: None Reported    Modes of Intervention: Activity      Discipline Responsible: PCA      Signature:   General Denise

## 2020-01-20 NOTE — PROGRESS NOTES
Group Therapy Note    Date:1/20/2020  Start Time: 1430  End Time: 4029    Number of Participants:5    Type of Group: Cognitive Skills    Patient's Goal:  To participate in mood management group    Notes: Patient declined to attend psychoeducation group at 1430 despite encouragement by staff.      Discipline Responsible: /Counselor    CHIQUITA Daniels

## 2020-01-20 NOTE — PROGRESS NOTES
[facility] FOLLOW-UP NOTE     1/20/2020     Patient was seen and examined in person, Chart reviewed   Patient's case discussed with staff/team    Chief Complaint: Psychosis    Interim History:       Pt is feeling better  Pt signed voluntary  Did not want to go to court hearing tomorrow  Informed court and [facility] center about the hearing to be cancelled  Pt slept better  Mood getting better  Will follow up with [facility] on discharge    Appetite:   [] Normal/Unchanged  [] Increased  [x] Decreased      Sleep:       [] Normal/Unchanged  [x] Fair       [] Poor              Energy:    [] Normal/Unchanged  [] Increased  [x] Decreased        SI [] Present  [] Absent    HI  []Present  [] Absent     Aggression:  [] verbal- yes  [x] no    Patient is [x] able  [] unable to CONTRACT FOR SAFETY     PAST MEDICAL/PSYCHIATRIC HISTORY:   Past Medical History:   Diagnosis Date    Allergic rhinitis     B[facility] disorder ([facility] [address]     Depression     Hyperlipidemia     Hypertension     Irritable bowel syndrome     PTSD (post-traumatic stress disorder)        FAMILY/SOCIAL HISTORY:  Family History   Problem Relation Age of Onset    Cancer Father         Throat     Social History     Socioeconomic History    Marital status:      Spouse name: Not on file    Number of children: Not on file    Years of education: Not on file    Highest education level: Not on file   Occupational History    Not on file   Social Needs    Financial resource strain: Not on file    Food insecurity:     Worry: Not on file     Inability: Not on file    Transportation needs:     Medical: Not on file     Non-medical: Not on file   Tobacco Use    Smoking status: Current Every Day Smoker     Packs/day: 1.00    Smokeless tobacco: Never Used    Tobacco comment: 1-2 ppd   Substance and Sexual Activity    Alcohol use: Yes     Comment: socially    Drug use: No    Sexual activity: Not on file   Lifestyle    Physical activity:     Days per week: Each Nostril, Daily, Antonio Escalera MD, 1 spray at 01/19/20 0941    metoprolol tartrate (LOPRESSOR) tablet 25 mg, 25 mg, Oral, Daily, Antonio Escalera MD, 25 mg at 01/20/20 4365    OXcarbazepine (TRILEPTAL) tablet 300 mg, 300 mg, Oral, BID, Antonio Escalera MD, 300 mg at 01/20/20 0289    acetaminophen (TYLENOL) tablet 650 mg, 650 mg, Oral, Q4H PRN, Antonio Escalera MD, 650 mg at 01/17/20 0315    magnesium hydroxide (MILK OF MAGNESIA) 400 MG/5ML suspension 30 mL, 30 mL, Oral, Daily PRN, Antonio Escalera MD    nicotine (NICODERM CQ) 21 MG/24HR 1 patch, 1 patch, Transdermal, Daily, Antonio Escalera MD, 1 patch at 01/20/20 0841    melatonin tablet 3 mg, 3 mg, Oral, Nightly PRN, Antonio Escalera MD, 3 mg at 01/19/20 2056    hydrOXYzine (VISTARIL) capsule 50 mg, 50 mg, Oral, Q6H PRN, 50 mg at 01/19/20 1055 **OR** hydrOXYzine (VISTARIL) injection 50 mg, 50 mg, Intramuscular, Q6H PRN, Antonio Escalera MD      Examination:  BP (!) 156/71   Pulse 102   Temp 98 °F (36.7 °C) (Oral)   Resp 18   Ht 5' 4\" (1.626 m)   Wt 250 lb (113.4 kg)   LMP  (LMP Unknown)   SpO2 91%   BMI 42.91 kg/m²   Gait - steady  Medication side effects(SE): no    Mental Status Examination:    Level of consciousness:  within normal limits   Appearance:  Better hygiene  Behavior/Motor:  Better  Attitude toward examiner:  better  Speech:  normal  Mood: not irritable, less depressed  Affect:  mood congruent  Thought processes:  illogical   Thought content:  Delusions:  Less paranoid  Perceptual Disturbance:  Denies auditory  Cognition:  oriented to person, place, and time   Concentration better  Insight poor   Judgement better    ASSESSMENT:   Patient symptoms are:  [] Well controlled  [x] Improving  [] Worsening  [] No change      Diagnosis:   Principal Problem:    Other schizoaffective disorders ([facility]  Resolved Problems:    * No resolved hospital problems. *      LABS:    No results for input(s): WBC, HGB, PLT in the last 72 hours.   No results for input(s): NA, K, CL, CO2, BUN, CREATININE, GLUCOSE in the last 72 hours. No results for input(s): BILITOT, ALKPHOS, AST, ALT in the last 72 hours. Lab Results   Component Value Date    LABAMPH Neg 01/11/2020    BARBSCNU Neg 01/11/2020    LABBENZ Neg 01/11/2020    LABMETH Neg 01/11/2020    OPIATESCREENURINE Neg 01/11/2020    PHENCYCLIDINESCREENURINE Neg 01/11/2020    ETOH <10 01/11/2020     Lab Results   Component Value Date    TSH 3.040 01/11/2020     No results found for: LITHIUM  Lab Results   Component Value Date    VALPROATE 53.3 06/30/2016       Treatment Plan:  Reviewed current Medications with the patient. Medication as ordered. abilify maintena - 300 mg IM today  Risks, benefits, side effects, drug-to-drug interactions and alternatives to treatment were discussed. Collateral information: pending  CD evaluation  Encourage patient to attend group and other milieu activities.   Discharge planning discussed with the patient and treatment team.    PSYCHOTHERAPY/COUNSELING:  [x] Therapeutic interview  [x] Supportive  [] CBT  [] Ongoing  [] Other  Patient was seen 1:1 for 20 minutes, other than E&M time spent, focusing on      - coping skills techniques     - Anxiety management techniques discussed including deep breathing exercise and PMR     - discussing patients strength and weakness      - Focusing on negative cognition and maladaptive thoughts, which is feeding and maintaining the depression symptoms        [x] Patient continues to need, on a daily basis, active treatment furnished directly by or requiring the supervision of inpatient psychiatric personnel      Anticipated Length of stay:            Electronically signed by Noemi Harris MD on 1/20/2020 at 11:02 AM

## 2020-01-20 NOTE — FLOWSHEET NOTE
Pt has been visible in day area. Flat in appearance. Minimally social. Calm and cooperative. Says her mood is improved. Anxiety and depression are \"very low. \" Relates anxiety only due to lack of cigarettes. Educated on smoking cessation. Pt says \" I know but I really just want to smoke. \" Continues to taryn drinks in her room. Not drinking as much as usual. Says she does not want her sodium to drop like it has in the past. Sleep and appetite are good.

## 2020-01-21 VITALS
WEIGHT: 250 LBS | TEMPERATURE: 97 F | HEART RATE: 94 BPM | RESPIRATION RATE: 16 BRPM | HEIGHT: 64 IN | OXYGEN SATURATION: 98 % | BODY MASS INDEX: 42.68 KG/M2 | SYSTOLIC BLOOD PRESSURE: 158 MMHG | DIASTOLIC BLOOD PRESSURE: 72 MMHG

## 2020-01-21 PROCEDURE — 99239 HOSP IP/OBS DSCHRG MGMT >30: CPT | Performed by: PSYCHIATRY & NEUROLOGY

## 2020-01-21 PROCEDURE — 6370000000 HC RX 637 (ALT 250 FOR IP): Performed by: PSYCHIATRY & NEUROLOGY

## 2020-01-21 RX ORDER — OXCARBAZEPINE 300 MG/1
300 TABLET, FILM COATED ORAL 2 TIMES DAILY
Qty: 60 TABLET | Refills: 1 | Status: SHIPPED | OUTPATIENT
Start: 2020-01-21

## 2020-01-21 RX ORDER — CITALOPRAM 10 MG/1
10 TABLET ORAL DAILY
Qty: 30 TABLET | Refills: 1 | Status: SHIPPED | OUTPATIENT
Start: 2020-01-21

## 2020-01-21 RX ORDER — PRAVASTATIN SODIUM 20 MG
20 TABLET ORAL NIGHTLY
Qty: 30 TABLET | Refills: 1 | Status: ON HOLD | OUTPATIENT
Start: 2020-01-21 | End: 2021-09-26 | Stop reason: HOSPADM

## 2020-01-21 RX ORDER — ARIPIPRAZOLE 15 MG/1
15 TABLET ORAL NIGHTLY
Qty: 30 TABLET | Refills: 0 | Status: SHIPPED | OUTPATIENT
Start: 2020-01-21

## 2020-01-21 RX ADMIN — METOPROLOL TARTRATE 25 MG: 25 TABLET ORAL at 09:18

## 2020-01-21 RX ADMIN — FLUTICASONE PROPIONATE 1 SPRAY: 50 SPRAY, METERED NASAL at 09:19

## 2020-01-21 RX ADMIN — HYDROXYZINE PAMOATE 50 MG: 50 CAPSULE ORAL at 00:35

## 2020-01-21 RX ADMIN — OXCARBAZEPINE 300 MG: 300 TABLET, FILM COATED ORAL at 09:18

## 2020-01-21 RX ADMIN — CITALOPRAM HYDROBROMIDE 10 MG: 10 TABLET ORAL at 09:18

## 2020-01-21 NOTE — DISCHARGE SUMMARY
DISCHARGE SUMMARY      Patient ID:  Jaimie Tobias  [id number]  78 y.o.  1955    Admit date: 1/11/2020    Discharge date and time: 1/21/2020    Admitting Physician: Antonio Escalera MD     Discharge Physician: Dr Sharri Davidson MD    Admission Diagnoses: Bipolar 1 disorder [facility]) [F31.9]    Admission Condition: poor    Discharged Condition: stable    Admission Circumstance:     Pt was brought to the [facility] for an assessment by her family for her increased agitation. Police were called to the house for her behaviors today. Per family pt has been decompensating easily angry and irritable with paranoid that family members are feeling unsafe around her. Pt admits that she has been with out her medications for a few days and that she go a bit confused on how to take them and is trying to get back on the medicine. Pt admits that she is under a lot of stress lately with the recent loss of friends/family. Pt admits she has not been sleeping well either.       PAST MEDICAL/PSYCHIATRIC HISTORY:   Past Medical History:   Diagnosis Date    Allergic rhinitis     Bipolar 1 disorder ([address]     Depression     Hyperlipidemia     Hypertension     Irritable bowel syndrome     PTSD (post-traumatic stress disorder)        FAMILY/SOCIAL HISTORY:  Family History   Problem Relation Age of Onset    Cancer Father         Throat     Social History     Socioeconomic History    Marital status:      Spouse name: Not on file    Number of children: Not on file    Years of education: Not on file    Highest education level: Not on file   Occupational History    Not on file   Social Needs    Financial resource strain: Not on file    Food insecurity:     Worry: Not on file     Inability: Not on file    Transportation needs:     Medical: Not on file     Non-medical: Not on file   Tobacco Use    Smoking status: Current Every Day Smoker     Packs/day: 1.00    Smokeless tobacco: Never Used    Tobacco comment: 1-2 ppd   Substance Dorcus Nageotte, MD, 25 mg at 01/20/20 5485    OXcarbazepine (TRILEPTAL) tablet 300 mg, 300 mg, Oral, BID, Rao Doll MD, 300 mg at 01/20/20 2023    acetaminophen (TYLENOL) tablet 650 mg, 650 mg, Oral, Q4H PRN, Rao Doll MD, 650 mg at 01/17/20 0315    magnesium hydroxide (MILK OF MAGNESIA) 400 MG/5ML suspension 30 mL, 30 mL, Oral, Daily PRN, Rao Doll MD    nicotine (NICODERM CQ) 21 MG/24HR 1 patch, 1 patch, Transdermal, Daily, Rao Doll MD, 1 patch at 01/20/20 0841    melatonin tablet 3 mg, 3 mg, Oral, Nightly PRN, Rao Doll MD, 3 mg at 01/20/20 2023    hydrOXYzine (VISTARIL) capsule 50 mg, 50 mg, Oral, Q6H PRN, 50 mg at 01/21/20 0035 **OR** hydrOXYzine (VISTARIL) injection 50 mg, 50 mg, Intramuscular, Q6H PRN, Rao Doll MD    Examination:  BP (!) 158/72 Comment: RN notified  Pulse 94   Temp 97 °F (36.1 °C) (Oral)   Resp 16   Ht 5' 4\" (1.626 m)   Wt 250 lb (113.4 kg)   LMP  (LMP Unknown)   SpO2 98%   BMI 42.91 kg/m²   Gait - steady    HOSPITAL COURSE[de-identified]  Following admission to the hospital, patient had a complete physical exam and blood work up  Patient was monitored closely with suicide precaution  Patient was started on medication as listed below  Was encouraged to participate in group and other milieu activity  Patient started to feel better with this combination of treatment. Significant progress in the symptoms since admission. Mood better, with the score of 2/10 - bad  Denies AVH or paranoid thoughts  No Hopeless or worthless feeling  No active SI/HI  Appetite:  [x] Normal  [] Increased  [] Decreased    Sleep:       [x] Normal  [] Fair       [] Poor            Energy:    [x] Normal  [] Increased  [] Decreased     SI [] Present  [x] Absent  HI  []Present  [x] Absent   Aggression:  [] yes  [] no  Patient is [x] able  [] unable to CONTRACT FOR SAFETY   Medication side effects(SE):  [x] None(Psych.  Meds.) [] Other      Mental Status Examination on discharge:    Level of DISCHARGE SUMMARY, MEDICATION RECONCILIATION AND FOLLOW UP CARE     SignedNile   1/21/2020  9:14 AM

## 2020-01-21 NOTE — PROGRESS NOTES
Medicated per uli davidson with motrin 400mg po at 2023 for c/o davy leg pain of a 3,and imodium 2mg po 2026 for c/o diarrhea r/t IBS and melatonin 3mg po sleep at 2023

## 2020-01-21 NOTE — GROUP NOTE
Group Therapy Note    Date: 1/21/2020    Group Start Time: 1000  Group End Time: 1100  Group Topic: Psychoeducation    [facility]    Kiarra Morales        Group Therapy Note    Attendees: 11         Patient's Goal: \"To go home\"    Notes:  Patient was talkative, she had flat affect and work minimal on her task in group.     Status After Intervention:  Improved    Participation Level: Minimal    Participation Quality: Appropriate      Speech:  talkative      Thought Process/Content: Linear      Affective Functioning: Flat      Mood: calm      Level of consciousness:  Alert      Response to Learning: Progressing to goal      Endings: None Reported    Modes of Intervention: Education, Socialization and Activity      Discipline Responsible: Psychoeducational Specialist      Signature:  Carlita Rodriguez

## 2020-01-21 NOTE — GROUP NOTE
Group Therapy Note    Date: 1/20/2020    Group Start Time: 1900  Group End Time: 1950  Group Topic: Recreational    [facility]    Marco A Knapp        Group Therapy Note    Attendees: 8         Patient's Goal:  To participate in the 1900 Activity Group. Notes:  Patient actively participated in the 1900 Activity Group. Status After Intervention:  Improved    Participation Level:  Active Listener and Interactive    Participation Quality: Appropriate and Attentive      Speech:  normal      Thought Process/Content: Logical      Affective Functioning: Congruent      Mood: euthymic      Level of consciousness:  Alert and Attentive      Response to Learning: Able to verbalize current knowledge/experience      Endings: None Reported    Modes of Intervention: Activity      Discipline Responsible: [address] [employer] [place] [employer]      Signature:  Marco A Knapp

## 2021-03-01 ENCOUNTER — HOSPITAL ENCOUNTER (EMERGENCY)
Age: 66
Discharge: HOME | End: 2021-03-01
Attending: EMERGENCY MEDICINE
Payer: MEDICARE

## 2021-03-01 ENCOUNTER — APPOINTMENT (OUTPATIENT)
Dept: GENERAL RADIOLOGY | Age: 66
End: 2021-03-01
Payer: MEDICARE

## 2021-03-01 VITALS
WEIGHT: 230 LBS | TEMPERATURE: 98.1 F | HEIGHT: 64 IN | BODY MASS INDEX: 39.27 KG/M2 | RESPIRATION RATE: 20 BRPM | DIASTOLIC BLOOD PRESSURE: 79 MMHG | OXYGEN SATURATION: 95 % | HEART RATE: 60 BPM | SYSTOLIC BLOOD PRESSURE: 151 MMHG

## 2021-03-01 DIAGNOSIS — R00.2 PALPITATIONS: Primary | ICD-10-CM

## 2021-03-01 LAB
ANION GAP SERPL CALCULATED.3IONS-SCNC: 9 MEQ/L (ref 9–15)
BASOPHILS ABSOLUTE: 0 K/UL (ref 0–0.2)
BASOPHILS RELATIVE PERCENT: 0.5 %
BUN BLDV-MCNC: 7 MG/DL (ref 8–23)
CALCIUM SERPL-MCNC: 8.7 MG/DL (ref 8.5–9.9)
CHLORIDE BLD-SCNC: 96 MEQ/L (ref 95–107)
CO2: 27 MEQ/L (ref 20–31)
CREAT SERPL-MCNC: 0.51 MG/DL (ref 0.5–0.9)
EKG ATRIAL RATE: 64 BPM
EKG P AXIS: 32 DEGREES
EKG P-R INTERVAL: 168 MS
EKG Q-T INTERVAL: 412 MS
EKG QRS DURATION: 106 MS
EKG QTC CALCULATION (BAZETT): 425 MS
EKG R AXIS: 23 DEGREES
EKG T AXIS: 77 DEGREES
EKG VENTRICULAR RATE: 64 BPM
EOSINOPHILS ABSOLUTE: 0.1 K/UL (ref 0–0.7)
EOSINOPHILS RELATIVE PERCENT: 1.3 %
GFR AFRICAN AMERICAN: >60
GFR NON-AFRICAN AMERICAN: >60
GLUCOSE BLD-MCNC: 180 MG/DL (ref 70–99)
HCT VFR BLD CALC: 31.7 % (ref 37–47)
HEMOGLOBIN: 10.5 G/DL (ref 12–16)
LYMPHOCYTES ABSOLUTE: 3.7 K/UL (ref 1–4.8)
LYMPHOCYTES RELATIVE PERCENT: 46.8 %
MAGNESIUM: 1.7 MG/DL (ref 1.7–2.4)
MCH RBC QN AUTO: 27.7 PG (ref 27–31.3)
MCHC RBC AUTO-ENTMCNC: 33.1 % (ref 33–37)
MCV RBC AUTO: 83.7 FL (ref 82–100)
MONOCYTES ABSOLUTE: 0.6 K/UL (ref 0.2–0.8)
MONOCYTES RELATIVE PERCENT: 7.6 %
NEUTROPHILS ABSOLUTE: 3.5 K/UL (ref 1.4–6.5)
NEUTROPHILS RELATIVE PERCENT: 43.8 %
PDW BLD-RTO: 13.9 % (ref 11.5–14.5)
PLATELET # BLD: 202 K/UL (ref 130–400)
POTASSIUM SERPL-SCNC: 3.8 MEQ/L (ref 3.4–4.9)
RBC # BLD: 3.79 M/UL (ref 4.2–5.4)
SODIUM BLD-SCNC: 132 MEQ/L (ref 135–144)
TROPONIN: <0.01 NG/ML (ref 0–0.01)
WBC # BLD: 7.9 K/UL (ref 4.8–10.8)

## 2021-03-01 PROCEDURE — 94761 N-INVAS EAR/PLS OXIMETRY MLT: CPT

## 2021-03-01 PROCEDURE — 93005 ELECTROCARDIOGRAM TRACING: CPT | Performed by: EMERGENCY MEDICINE

## 2021-03-01 PROCEDURE — 80048 BASIC METABOLIC PNL TOTAL CA: CPT

## 2021-03-01 PROCEDURE — 93010 ELECTROCARDIOGRAM REPORT: CPT | Performed by: INTERNAL MEDICINE

## 2021-03-01 PROCEDURE — 71045 X-RAY EXAM CHEST 1 VIEW: CPT

## 2021-03-01 PROCEDURE — 94640 AIRWAY INHALATION TREATMENT: CPT

## 2021-03-01 PROCEDURE — 83735 ASSAY OF MAGNESIUM: CPT

## 2021-03-01 PROCEDURE — 6370000000 HC RX 637 (ALT 250 FOR IP): Performed by: EMERGENCY MEDICINE

## 2021-03-01 PROCEDURE — 84484 ASSAY OF TROPONIN QUANT: CPT

## 2021-03-01 PROCEDURE — 36415 COLL VENOUS BLD VENIPUNCTURE: CPT

## 2021-03-01 PROCEDURE — 85025 COMPLETE CBC W/AUTO DIFF WBC: CPT

## 2021-03-01 PROCEDURE — 99285 EMERGENCY DEPT VISIT HI MDM: CPT

## 2021-03-01 RX ORDER — ALBUTEROL SULFATE 90 UG/1
2 AEROSOL, METERED RESPIRATORY (INHALATION) 4 TIMES DAILY PRN
Qty: 1 INHALER | Refills: 0 | Status: SHIPPED | OUTPATIENT
Start: 2021-03-01

## 2021-03-01 RX ORDER — ALBUTEROL SULFATE 90 UG/1
2 AEROSOL, METERED RESPIRATORY (INHALATION) 4 TIMES DAILY PRN
Qty: 1 INHALER | Refills: 0 | Status: SHIPPED | OUTPATIENT
Start: 2021-03-01 | End: 2021-03-01 | Stop reason: SDUPTHER

## 2021-03-01 RX ORDER — IPRATROPIUM BROMIDE AND ALBUTEROL SULFATE 2.5; .5 MG/3ML; MG/3ML
1 SOLUTION RESPIRATORY (INHALATION) PRN
Status: DISCONTINUED | OUTPATIENT
Start: 2021-03-01 | End: 2021-03-01 | Stop reason: HOSPADM

## 2021-03-01 RX ADMIN — IPRATROPIUM BROMIDE AND ALBUTEROL SULFATE 1 AMPULE: .5; 3 SOLUTION RESPIRATORY (INHALATION) at 02:47

## 2021-03-01 ASSESSMENT — PAIN DESCRIPTION - LOCATION: LOCATION: OTHER (COMMENT)

## 2021-03-01 ASSESSMENT — PAIN SCALES - GENERAL: PAINLEVEL_OUTOF10: 9

## 2021-03-01 ASSESSMENT — PAIN DESCRIPTION - DESCRIPTORS: DESCRIPTORS: DULL

## 2021-03-01 NOTE — ED NOTES
Patient aware of transfer back to [facility], [address] [facility]  03/01/21 101

## 2021-03-01 NOTE — ED TRIAGE NOTES
In [facility] and has been having palpitations. States occasional chest pain, heaviness.  Overall generalized aching

## 2021-03-01 NOTE — ED PROVIDER NOTES
[address] [facility] ED  EMERGENCY DEPARTMENT ENCOUNTER      Pt Name: Laurent Toney  MRN: [id number]  [place] 1955  Date of evaluation: 3/1/2021  Provider: Tenzin Spangler MD    [address] [place] [address]       Chief Complaint   Patient presents with    Palpitations         HISTORY OF PRESENT ILLNESS   (Location/Symptom, Timing/Onset, Context/Setting, Quality, Duration, Modifying Factors, Severity)  Note limiting factors. 68-year-old female presenting with palpitations. States that she has had palpitations for months since \"she moved from [place]. \"  Sent here from [facility]. No cough or fever. Patient states she felt better when she was in the cold air outside. States that she was \"suffocating at clear [place]. \"  No reports of any specific pain. Denies any cough or fever. No history of blood clots. Clear history is difficult to obtain. Nursing Notes were reviewed. REVIEW OF SYSTEMS    (2-9 systems for level 4, 10 or more for level 5)     Review of Systems   Cardiovascular: Positive for palpitations. All other systems reviewed and are negative. Except as noted above the remainder of the review of systems was reviewed and negative.        PAST MEDICAL HISTORY     Past Medical History:   Diagnosis Date    Allergic rhinitis     Bipolar 1 disorder ([facility] [address]     Depression     Hyperlipidemia     Hypertension     Irritable bowel syndrome     PTSD (post-traumatic stress disorder)          SURGICAL HISTORY       Past Surgical History:   Procedure Laterality Date    CHOLECYSTECTOMY      HYSTERECTOMY           CURRENT MEDICATIONS       Current Discharge Medication List      CONTINUE these medications which have NOT CHANGED    Details   OXcarbazepine (TRILEPTAL) 300 MG tablet Take 1 tablet by mouth 2 times daily  Qty: 60 tablet, Refills: 1      citalopram (CELEXA) 10 MG tablet Take 1 tablet by mouth daily  Qty: 30 tablet, Refills: 1      ARIPiprazole (ABILIFY) 15 MG tablet Take 1 tablet by mouth file     Physically abused: Not on file     Forced sexual activity: Not on file   Other Topics Concern    Not on file   Social History Narrative    Not on file       SCREENINGS    [place] Coma Scale  Eye Opening: Spontaneous  Best Verbal Response: Oriented  Best Motor Response: Obeys commands  [place] Coma Scale Score: 15          PHYSICAL EXAM    (up to 7 for level 4, 8 or more for level 5)     ED Triage Vitals [03/01/21 0059]   BP Temp Temp Source Pulse Resp SpO2 Height Weight   (!) 144/70 98.1 °F (36.7 °C) Oral 72 16 95 % 5' 4\" (1.626 m) 230 lb (104.3 kg)       Physical Exam  Vitals signs and nursing note reviewed. Constitutional:       General: She is not in acute distress. Appearance: Normal appearance. She is well-developed. She is obese. HENT:      Head: Normocephalic and atraumatic. Mouth/Throat:      Mouth: Mucous membranes are moist.      Pharynx: Oropharynx is clear. Eyes:      Extraocular Movements: Extraocular movements intact. Conjunctiva/sclera: Conjunctivae normal.   Neck:      Musculoskeletal: Normal range of motion and neck supple. Cardiovascular:      Rate and Rhythm: Normal rate and regular rhythm. Pulmonary:      Effort: Pulmonary effort is normal.      Breath sounds: Normal breath sounds. Abdominal:      General: Bowel sounds are normal.      Palpations: Abdomen is soft. Musculoskeletal: Normal range of motion. General: No deformity. Skin:     General: Skin is warm and dry. Capillary Refill: Capillary refill takes less than 2 seconds. Neurological:      General: No focal deficit present. Mental Status: She is alert and oriented to person, place, and time. Mental status is at baseline. Cranial Nerves: No cranial nerve deficit. Psychiatric:         Thought Content:  Thought content normal.         DIAGNOSTIC RESULTS     EKG: All EKG's are interpreted by the Emergency Department Physician who either signs or Co-signs this chart in the [facility].        Procedures    CRITICAL CARE TIME   Total Critical Care time was 0 minutes, excluding separately reportable procedures. There was a high probability of clinically significant/life threatening deterioration in the patient's condition which required my urgent intervention. FINAL IMPRESSION      1.  Palpitations          DISPOSITION/PLAN   DISPOSITION Decision To Discharge 03/01/2021 02:50:18 AM      (Please note that portions of this note were completed with a voice recognition program.  Efforts were made to edit the dictations but occasionally words are mis-transcribed.)    Candida Rothman MD (electronically signed)  Attending Emergency Physician        Candida Rothman MD  03/01/21 [id number]

## 2021-09-25 ENCOUNTER — HOSPITAL ENCOUNTER (INPATIENT)
Age: 66
LOS: 1 days | Discharge: NURSING, REHAB OR LONG-TERM CARE | DRG: 379 | End: 2021-09-26
Attending: INTERNAL MEDICINE | Admitting: INTERNAL MEDICINE
Payer: MEDICARE

## 2021-09-25 ENCOUNTER — APPOINTMENT (OUTPATIENT)
Dept: GENERAL RADIOLOGY | Age: 66
DRG: 379 | End: 2021-09-25
Payer: MEDICARE

## 2021-09-25 DIAGNOSIS — R06.02 SHORTNESS OF BREATH: ICD-10-CM

## 2021-09-25 DIAGNOSIS — D64.9 ANEMIA, UNSPECIFIED TYPE: ICD-10-CM

## 2021-09-25 DIAGNOSIS — R07.9 CHEST PAIN, UNSPECIFIED TYPE: Primary | ICD-10-CM

## 2021-09-25 DIAGNOSIS — R00.1 BRADYCARDIA: ICD-10-CM

## 2021-09-25 DIAGNOSIS — K92.2 LOWER GI BLEED: ICD-10-CM

## 2021-09-25 LAB
ALBUMIN SERPL-MCNC: 3.6 G/DL (ref 3.5–4.6)
ALP BLD-CCNC: 89 U/L (ref 40–130)
ALT SERPL-CCNC: 19 U/L (ref 0–33)
ANION GAP SERPL CALCULATED.3IONS-SCNC: 11 MEQ/L (ref 9–15)
AST SERPL-CCNC: 33 U/L (ref 0–35)
BASOPHILS ABSOLUTE: 0 K/UL (ref 0–0.2)
BASOPHILS RELATIVE PERCENT: 0.5 %
BILIRUB SERPL-MCNC: <0.2 MG/DL (ref 0.2–0.7)
BUN BLDV-MCNC: 5 MG/DL (ref 8–23)
CALCIUM SERPL-MCNC: 8.8 MG/DL (ref 8.5–9.9)
CHLORIDE BLD-SCNC: 95 MEQ/L (ref 95–107)
CO2: 25 MEQ/L (ref 20–31)
CREAT SERPL-MCNC: 0.49 MG/DL (ref 0.5–0.9)
EOSINOPHILS ABSOLUTE: 0.1 K/UL (ref 0–0.7)
EOSINOPHILS RELATIVE PERCENT: 1.4 %
GFR AFRICAN AMERICAN: >60
GFR NON-AFRICAN AMERICAN: >60
GLOBULIN: 2.4 G/DL (ref 2.3–3.5)
GLUCOSE BLD-MCNC: 111 MG/DL (ref 70–99)
GLUCOSE BLD-MCNC: 112 MG/DL (ref 60–115)
GLUCOSE BLD-MCNC: 119 MG/DL (ref 60–115)
HCT VFR BLD CALC: 23.1 % (ref 37–47)
HCT VFR BLD CALC: 23.6 % (ref 37–47)
HCT VFR BLD CALC: 23.8 % (ref 37–47)
HEMOGLOBIN: 7.5 G/DL (ref 12–16)
HEMOGLOBIN: 7.7 G/DL (ref 12–16)
HEMOGLOBIN: 7.8 G/DL (ref 12–16)
IRON SATURATION: 8 % (ref 11–46)
IRON: 28 UG/DL (ref 37–145)
LV EF: 60 %
LVEF MODALITY: NORMAL
LYMPHOCYTES ABSOLUTE: 2.8 K/UL (ref 1–4.8)
LYMPHOCYTES RELATIVE PERCENT: 37.5 %
MCH RBC QN AUTO: 24.8 PG (ref 27–31.3)
MCHC RBC AUTO-ENTMCNC: 32.5 % (ref 33–37)
MCV RBC AUTO: 76.3 FL (ref 82–100)
MONOCYTES ABSOLUTE: 0.5 K/UL (ref 0.2–0.8)
MONOCYTES RELATIVE PERCENT: 6.8 %
NEUTROPHILS ABSOLUTE: 4.1 K/UL (ref 1.4–6.5)
NEUTROPHILS RELATIVE PERCENT: 53.8 %
PDW BLD-RTO: 15.7 % (ref 11.5–14.5)
PERFORMED ON: ABNORMAL
PERFORMED ON: NORMAL
PLATELET # BLD: 214 K/UL (ref 130–400)
POTASSIUM SERPL-SCNC: 4 MEQ/L (ref 3.4–4.9)
PRO-BNP: 280 PG/ML
RBC # BLD: 3.02 M/UL (ref 4.2–5.4)
SODIUM BLD-SCNC: 131 MEQ/L (ref 135–144)
TOTAL IRON BINDING CAPACITY: 365 UG/DL (ref 178–450)
TOTAL PROTEIN: 6 G/DL (ref 6.3–8)
TROPONIN: <0.01 NG/ML (ref 0–0.01)
WBC # BLD: 7.6 K/UL (ref 4.8–10.8)

## 2021-09-25 PROCEDURE — 94664 DEMO&/EVAL PT USE INHALER: CPT

## 2021-09-25 PROCEDURE — 2580000003 HC RX 258: Performed by: PERSONAL EMERGENCY RESPONSE ATTENDANT

## 2021-09-25 PROCEDURE — 99222 1ST HOSP IP/OBS MODERATE 55: CPT | Performed by: SPECIALIST

## 2021-09-25 PROCEDURE — 83550 IRON BINDING TEST: CPT

## 2021-09-25 PROCEDURE — 2580000003 HC RX 258: Performed by: INTERNAL MEDICINE

## 2021-09-25 PROCEDURE — 93306 TTE W/DOPPLER COMPLETE: CPT

## 2021-09-25 PROCEDURE — 85014 HEMATOCRIT: CPT

## 2021-09-25 PROCEDURE — 84484 ASSAY OF TROPONIN QUANT: CPT

## 2021-09-25 PROCEDURE — 94640 AIRWAY INHALATION TREATMENT: CPT

## 2021-09-25 PROCEDURE — C9113 INJ PANTOPRAZOLE SODIUM, VIA: HCPCS | Performed by: INTERNAL MEDICINE

## 2021-09-25 PROCEDURE — 6370000000 HC RX 637 (ALT 250 FOR IP): Performed by: PERSONAL EMERGENCY RESPONSE ATTENDANT

## 2021-09-25 PROCEDURE — G0378 HOSPITAL OBSERVATION PER HR: HCPCS

## 2021-09-25 PROCEDURE — 99284 EMERGENCY DEPT VISIT MOD MDM: CPT

## 2021-09-25 PROCEDURE — 83880 ASSAY OF NATRIURETIC PEPTIDE: CPT

## 2021-09-25 PROCEDURE — 71045 X-RAY EXAM CHEST 1 VIEW: CPT

## 2021-09-25 PROCEDURE — 99222 1ST HOSP IP/OBS MODERATE 55: CPT | Performed by: INTERNAL MEDICINE

## 2021-09-25 PROCEDURE — 96376 TX/PRO/DX INJ SAME DRUG ADON: CPT

## 2021-09-25 PROCEDURE — 80053 COMPREHEN METABOLIC PANEL: CPT

## 2021-09-25 PROCEDURE — 96375 TX/PRO/DX INJ NEW DRUG ADDON: CPT

## 2021-09-25 PROCEDURE — 94761 N-INVAS EAR/PLS OXIMETRY MLT: CPT

## 2021-09-25 PROCEDURE — 83540 ASSAY OF IRON: CPT

## 2021-09-25 PROCEDURE — 96374 THER/PROPH/DIAG INJ IV PUSH: CPT

## 2021-09-25 PROCEDURE — 2500000003 HC RX 250 WO HCPCS: Performed by: PERSONAL EMERGENCY RESPONSE ATTENDANT

## 2021-09-25 PROCEDURE — 85018 HEMOGLOBIN: CPT

## 2021-09-25 PROCEDURE — 6360000002 HC RX W HCPCS: Performed by: INTERNAL MEDICINE

## 2021-09-25 PROCEDURE — 2580000003 HC RX 258: Performed by: SPECIALIST

## 2021-09-25 PROCEDURE — 6370000000 HC RX 637 (ALT 250 FOR IP): Performed by: INTERNAL MEDICINE

## 2021-09-25 PROCEDURE — 1210000000 HC MED SURG R&B

## 2021-09-25 PROCEDURE — 36415 COLL VENOUS BLD VENIPUNCTURE: CPT

## 2021-09-25 PROCEDURE — 85025 COMPLETE CBC W/AUTO DIFF WBC: CPT

## 2021-09-25 RX ORDER — NITROGLYCERIN 0.4 MG/1
0.4 TABLET SUBLINGUAL ONCE
Status: COMPLETED | OUTPATIENT
Start: 2021-09-25 | End: 2021-09-25

## 2021-09-25 RX ORDER — SODIUM CHLORIDE 9 MG/ML
10 INJECTION INTRAVENOUS 2 TIMES DAILY
Status: DISCONTINUED | OUTPATIENT
Start: 2021-09-25 | End: 2021-09-26 | Stop reason: HOSPADM

## 2021-09-25 RX ORDER — SODIUM CHLORIDE 0.9 % (FLUSH) 0.9 %
5-40 SYRINGE (ML) INJECTION PRN
Status: DISCONTINUED | OUTPATIENT
Start: 2021-09-25 | End: 2021-09-26 | Stop reason: HOSPADM

## 2021-09-25 RX ORDER — ACETAMINOPHEN 325 MG/1
650 TABLET ORAL EVERY 6 HOURS PRN
COMMUNITY

## 2021-09-25 RX ORDER — SODIUM CHLORIDE 0.9 % (FLUSH) 0.9 %
5-40 SYRINGE (ML) INJECTION EVERY 12 HOURS SCHEDULED
Status: DISCONTINUED | OUTPATIENT
Start: 2021-09-25 | End: 2021-09-26 | Stop reason: HOSPADM

## 2021-09-25 RX ORDER — ATORVASTATIN CALCIUM 10 MG/1
10 TABLET, FILM COATED ORAL DAILY
Status: DISCONTINUED | OUTPATIENT
Start: 2021-09-25 | End: 2021-09-26 | Stop reason: HOSPADM

## 2021-09-25 RX ORDER — PRAZOSIN HYDROCHLORIDE 1 MG/1
1 CAPSULE ORAL NIGHTLY
Status: DISCONTINUED | OUTPATIENT
Start: 2021-09-25 | End: 2021-09-26 | Stop reason: HOSPADM

## 2021-09-25 RX ORDER — POLYETHYLENE GLYCOL 3350 17 G/17G
17 POWDER, FOR SOLUTION ORAL DAILY PRN
Status: DISCONTINUED | OUTPATIENT
Start: 2021-09-25 | End: 2021-09-26 | Stop reason: HOSPADM

## 2021-09-25 RX ORDER — ONDANSETRON 4 MG/1
4 TABLET, ORALLY DISINTEGRATING ORAL EVERY 8 HOURS PRN
Status: DISCONTINUED | OUTPATIENT
Start: 2021-09-25 | End: 2021-09-26 | Stop reason: HOSPADM

## 2021-09-25 RX ORDER — ACETAMINOPHEN 650 MG/1
650 SUPPOSITORY RECTAL EVERY 6 HOURS PRN
Status: DISCONTINUED | OUTPATIENT
Start: 2021-09-25 | End: 2021-09-26 | Stop reason: HOSPADM

## 2021-09-25 RX ORDER — ACETAMINOPHEN 325 MG/1
650 TABLET ORAL EVERY 6 HOURS PRN
Status: DISCONTINUED | OUTPATIENT
Start: 2021-09-25 | End: 2021-09-26 | Stop reason: HOSPADM

## 2021-09-25 RX ORDER — IBUPROFEN 600 MG/1
1 TABLET ORAL PRN
COMMUNITY

## 2021-09-25 RX ORDER — MONTELUKAST SODIUM 10 MG/1
10 TABLET ORAL NIGHTLY
Status: DISCONTINUED | OUTPATIENT
Start: 2021-09-25 | End: 2021-09-26 | Stop reason: HOSPADM

## 2021-09-25 RX ORDER — ARIPIPRAZOLE 10 MG/1
20 TABLET ORAL NIGHTLY
Status: DISCONTINUED | OUTPATIENT
Start: 2021-09-25 | End: 2021-09-26 | Stop reason: HOSPADM

## 2021-09-25 RX ORDER — SODIUM CHLORIDE 9 MG/ML
25 INJECTION, SOLUTION INTRAVENOUS PRN
Status: DISCONTINUED | OUTPATIENT
Start: 2021-09-25 | End: 2021-09-26 | Stop reason: HOSPADM

## 2021-09-25 RX ORDER — CITALOPRAM 10 MG/1
10 TABLET ORAL DAILY
Status: DISCONTINUED | OUTPATIENT
Start: 2021-09-25 | End: 2021-09-26 | Stop reason: HOSPADM

## 2021-09-25 RX ORDER — PRAZOSIN HYDROCHLORIDE 1 MG/1
1 CAPSULE ORAL NIGHTLY
COMMUNITY

## 2021-09-25 RX ORDER — MECOBALAMIN 5000 MCG
5 TABLET,DISINTEGRATING ORAL NIGHTLY PRN
Status: DISCONTINUED | OUTPATIENT
Start: 2021-09-25 | End: 2021-09-26 | Stop reason: HOSPADM

## 2021-09-25 RX ORDER — SODIUM CHLORIDE 9 MG/ML
INJECTION, SOLUTION INTRAVENOUS CONTINUOUS
Status: DISCONTINUED | OUTPATIENT
Start: 2021-09-25 | End: 2021-09-26

## 2021-09-25 RX ORDER — LOPERAMIDE HYDROCHLORIDE 2 MG/1
2 CAPSULE ORAL 4 TIMES DAILY PRN
COMMUNITY

## 2021-09-25 RX ORDER — OXCARBAZEPINE 150 MG/1
150 TABLET, FILM COATED ORAL
Status: DISCONTINUED | OUTPATIENT
Start: 2021-09-26 | End: 2021-09-26 | Stop reason: HOSPADM

## 2021-09-25 RX ORDER — MONTELUKAST SODIUM 10 MG/1
10 TABLET ORAL NIGHTLY
COMMUNITY

## 2021-09-25 RX ORDER — NICOTINE 21 MG/24HR
1 PATCH, TRANSDERMAL 24 HOURS TRANSDERMAL EVERY 24 HOURS
COMMUNITY

## 2021-09-25 RX ORDER — FOLIC ACID 1 MG/1
1 TABLET ORAL DAILY
Status: DISCONTINUED | OUTPATIENT
Start: 2021-09-25 | End: 2021-09-26 | Stop reason: HOSPADM

## 2021-09-25 RX ORDER — ERGOCALCIFEROL 1.25 MG/1
50000 CAPSULE ORAL WEEKLY
Status: DISCONTINUED | OUTPATIENT
Start: 2021-09-25 | End: 2021-09-26 | Stop reason: HOSPADM

## 2021-09-25 RX ORDER — ALBUTEROL SULFATE 90 UG/1
2 AEROSOL, METERED RESPIRATORY (INHALATION) 4 TIMES DAILY PRN
Status: DISCONTINUED | OUTPATIENT
Start: 2021-09-25 | End: 2021-09-26 | Stop reason: HOSPADM

## 2021-09-25 RX ORDER — OXCARBAZEPINE 150 MG/1
150 TABLET, FILM COATED ORAL
COMMUNITY

## 2021-09-25 RX ORDER — IPRATROPIUM BROMIDE AND ALBUTEROL SULFATE 2.5; .5 MG/3ML; MG/3ML
1 SOLUTION RESPIRATORY (INHALATION) CONTINUOUS PRN
Status: DISCONTINUED | OUTPATIENT
Start: 2021-09-25 | End: 2021-09-26 | Stop reason: HOSPADM

## 2021-09-25 RX ORDER — FOLIC ACID 1 MG/1
1 TABLET ORAL DAILY
COMMUNITY

## 2021-09-25 RX ORDER — OXCARBAZEPINE 300 MG/1
300 TABLET, FILM COATED ORAL 2 TIMES DAILY
Status: DISCONTINUED | OUTPATIENT
Start: 2021-09-25 | End: 2021-09-25

## 2021-09-25 RX ORDER — NICOTINE 21 MG/24HR
1 PATCH, TRANSDERMAL 24 HOURS TRANSDERMAL EVERY 24 HOURS
Status: DISCONTINUED | OUTPATIENT
Start: 2021-09-25 | End: 2021-09-26 | Stop reason: HOSPADM

## 2021-09-25 RX ORDER — 0.9 % SODIUM CHLORIDE 0.9 %
1000 INTRAVENOUS SOLUTION INTRAVENOUS ONCE
Status: COMPLETED | OUTPATIENT
Start: 2021-09-25 | End: 2021-09-25

## 2021-09-25 RX ORDER — ATORVASTATIN CALCIUM 10 MG/1
10 TABLET, FILM COATED ORAL DAILY
COMMUNITY

## 2021-09-25 RX ORDER — OXCARBAZEPINE 300 MG/1
600 TABLET, FILM COATED ORAL NIGHTLY
Status: DISCONTINUED | OUTPATIENT
Start: 2021-09-25 | End: 2021-09-26 | Stop reason: HOSPADM

## 2021-09-25 RX ORDER — ERGOCALCIFEROL 1.25 MG/1
50000 CAPSULE ORAL WEEKLY
COMMUNITY

## 2021-09-25 RX ORDER — MELATONIN 3 MG
3 TABLET ORAL NIGHTLY PRN
COMMUNITY

## 2021-09-25 RX ORDER — ONDANSETRON 2 MG/ML
4 INJECTION INTRAMUSCULAR; INTRAVENOUS EVERY 6 HOURS PRN
Status: DISCONTINUED | OUTPATIENT
Start: 2021-09-25 | End: 2021-09-26 | Stop reason: HOSPADM

## 2021-09-25 RX ORDER — NALOXONE HYDROCHLORIDE 0.4 MG/ML
0.4 INJECTION, SOLUTION INTRAMUSCULAR; INTRAVENOUS; SUBCUTANEOUS PRN
COMMUNITY

## 2021-09-25 RX ORDER — PANTOPRAZOLE SODIUM 40 MG/10ML
40 INJECTION, POWDER, LYOPHILIZED, FOR SOLUTION INTRAVENOUS 2 TIMES DAILY
Status: DISCONTINUED | OUTPATIENT
Start: 2021-09-25 | End: 2021-09-26

## 2021-09-25 RX ADMIN — OXCARBAZEPINE 600 MG: 300 TABLET, FILM COATED ORAL at 20:23

## 2021-09-25 RX ADMIN — SODIUM CHLORIDE, PRESERVATIVE FREE 10 ML: 5 INJECTION INTRAVENOUS at 10:57

## 2021-09-25 RX ADMIN — SODIUM CHLORIDE: 9 INJECTION, SOLUTION INTRAVENOUS at 10:49

## 2021-09-25 RX ADMIN — SODIUM CHLORIDE, PRESERVATIVE FREE 10 ML: 5 INJECTION INTRAVENOUS at 20:23

## 2021-09-25 RX ADMIN — PRAZOSIN HYDROCHLORIDE 1 MG: 1 CAPSULE ORAL at 20:23

## 2021-09-25 RX ADMIN — PANTOPRAZOLE SODIUM 40 MG: 40 INJECTION, POWDER, FOR SOLUTION INTRAVENOUS at 10:49

## 2021-09-25 RX ADMIN — SODIUM CHLORIDE, PRESERVATIVE FREE 10 ML: 5 INJECTION INTRAVENOUS at 20:24

## 2021-09-25 RX ADMIN — IPRATROPIUM BROMIDE AND ALBUTEROL SULFATE 1 AMPULE: .5; 3 SOLUTION RESPIRATORY (INHALATION) at 04:07

## 2021-09-25 RX ADMIN — FOLIC ACID 1 MG: 1 TABLET ORAL at 10:49

## 2021-09-25 RX ADMIN — CITALOPRAM HYDROBROMIDE 10 MG: 10 TABLET ORAL at 10:49

## 2021-09-25 RX ADMIN — ONDANSETRON 4 MG: 2 INJECTION INTRAMUSCULAR; INTRAVENOUS at 23:48

## 2021-09-25 RX ADMIN — ARIPIPRAZOLE 20 MG: 10 TABLET ORAL at 20:24

## 2021-09-25 RX ADMIN — PANTOPRAZOLE SODIUM 40 MG: 40 INJECTION, POWDER, FOR SOLUTION INTRAVENOUS at 20:22

## 2021-09-25 RX ADMIN — NITROGLYCERIN 0.4 MG: 0.4 TABLET, ORALLY DISINTEGRATING SUBLINGUAL at 05:15

## 2021-09-25 RX ADMIN — SODIUM CHLORIDE 1000 ML: 9 INJECTION, SOLUTION INTRAVENOUS at 05:15

## 2021-09-25 RX ADMIN — ATORVASTATIN CALCIUM 10 MG: 10 TABLET, FILM COATED ORAL at 10:49

## 2021-09-25 RX ADMIN — FAMOTIDINE 20 MG: 10 INJECTION, SOLUTION INTRAVENOUS at 03:55

## 2021-09-25 RX ADMIN — MONTELUKAST 10 MG: 10 TABLET, FILM COATED ORAL at 20:23

## 2021-09-25 ASSESSMENT — ENCOUNTER SYMPTOMS
GASTROINTESTINAL NEGATIVE: 1
WHEEZING: 0
DIARRHEA: 0
COLOR CHANGE: 0
SHORTNESS OF BREATH: 1
SHORTNESS OF BREATH: 0
EYES NEGATIVE: 1
RHINORRHEA: 0
SORE THROAT: 0
COUGH: 0
BLOOD IN STOOL: 0
ABDOMINAL PAIN: 0
VOMITING: 0
NAUSEA: 0
CHEST TIGHTNESS: 0
STRIDOR: 0

## 2021-09-25 NOTE — CONSULTS
Consults    Patient Name: Matty Lindsey  Admit Date: 2021  2:54 AM  MR #: [id number]  : 1955    Attending Physician: Kamala Mckay MD  Reason for consult: CP dyspnea    History of Presenting Illness:      Matty Lindsey is a 78 y.o. female on hospital day 0 with a history of . History Obtained From:  patient, electronic medical record    Pt admitted with CP from NH. CP is vague and related to anxiety. She also has Exertional dyspnea which is not new. Trops are negative ECG no injury and SR. No prior CAD.      She has significant anemia form GIB  History:      EKG: SR  Past Medical History:   Diagnosis Date    Allergic rhinitis     Bipolar 1 disorder (HCC)     Depression     Hyperlipidemia     Hypertension     Irritable bowel syndrome     PTSD (post-traumatic stress disorder)      Past Surgical History:   Procedure Laterality Date    CHOLECYSTECTOMY      HYSTERECTOMY         Family History  Family History   Problem Relation Age of Onset    Cancer Father         Throat     [] Unable to obtain due to ventilated and/ or neurologic status    Social History     Socioeconomic History    Marital status:      Spouse name: Not on file    Number of children: Not on file    Years of education: Not on file    Highest education level: Not on file   Occupational History    Not on file   Tobacco Use    Smoking status: Current Every Day Smoker     Packs/day: 1.00    Smokeless tobacco: Never Used    Tobacco comment: 1-2 ppd   Vaping Use    Vaping Use: Never used   Substance and Sexual Activity    Alcohol use: Yes     Comment: socially    Drug use: No    Sexual activity: Not on file   Other Topics Concern    Not on file   Social History Narrative    Not on file     Social Determinants of Health     Financial Resource Strain:     Difficulty of Paying Living Expenses:    Food Insecurity:     Worried About Running Out of Food in the Last Year:     Stephen of Food in the Last Year: Transportation Needs:     Lack of Transportation (Medical):      Lack of Transportation (Non-Medical):    Physical Activity:     Days of Exercise per Week:     Minutes of Exercise per Session:    Stress:     Feeling of Stress :    Social Connections:     Frequency of Communication with Friends and Family:     Frequency of Social Gatherings with Friends and Family:     Attends Taoism Services:     Active Member of Clubs or Organizations:     Attends Club or Organization Meetings:     Marital Status:    Intimate Partner Violence:     Fear of Current or Ex-Partner:     Emotionally Abused:     Physically Abused:     Sexually Abused:       [] Unable to obtain due to ventilated and/ or neurologic status      Home Medications:      Medications Prior to Admission: insulin aspart (NOVOLOG) 100 UNIT/ML injection vial, Inject into the skin 4 times daily (before meals and nightly) 151-200=2 units 201-250=4 units 251-300=6 units 301-350=8 units 351-400= 10 units >401 recheck and notify provider  naloxone (NARCAN) 0.4 MG/ML injection, Infuse 0.4 mg intravenously as needed  Melatonin 3-10 MG TABS, Take 3 mg by mouth nightly as needed (for insomnia)  Glucagon, rDNA, (GLUCAGON EMERGENCY) 1 MG KIT, Inject 1 mg as directed as needed (low blood sugar)  acetaminophen (TYLENOL) 325 MG tablet, Take 650 mg by mouth every 6 hours as needed for Pain or Fever  prazosin (MINIPRESS) 1 MG capsule, Take 1 mg by mouth nightly  vitamin D (ERGOCALCIFEROL) 1.25 MG (86140 UT) CAPS capsule, Take 50,000 Units by mouth once a week Every wednesday  atorvastatin (LIPITOR) 10 MG tablet, Take 10 mg by mouth daily  folic acid (FOLVITE) 1 MG tablet, Take 1 mg by mouth daily  metFORMIN (GLUCOPHAGE) 500 MG tablet, Take 500 mg by mouth 3 times daily (with meals)  montelukast (SINGULAIR) 10 MG tablet, Take 10 mg by mouth nightly  nicotine (NICODERM CQ) 21 MG/24HR, Place 1 patch onto the skin every 24 hours  OXcarbazepine (TRILEPTAL) 150 MG tablet, Take 150 mg by mouth daily (with breakfast)  loperamide (IMODIUM) 2 MG capsule, Take 2 mg by mouth 4 times daily as needed for Diarrhea  albuterol sulfate HFA (VENTOLIN HFA) 108 (90 Base) MCG/ACT inhaler, Inhale 2 puffs into the lungs 4 times daily as needed for Wheezing  OXcarbazepine (TRILEPTAL) 300 MG tablet, Take 1 tablet by mouth 2 times daily  citalopram (CELEXA) 10 MG tablet, Take 1 tablet by mouth daily  ARIPiprazole (ABILIFY) 15 MG tablet, Take 1 tablet by mouth nightly  metoprolol tartrate (LOPRESSOR) 25 MG tablet, Take 1 tablet by mouth daily  ARIPiprazole ER (ABILIFY MAINTENA) 400 MG PRSY, Inject 300 mg into the muscle every 30 days  pravastatin (PRAVACHOL) 20 MG tablet, Take 1 tablet by mouth nightly  fluticasone (FLONASE) 50 MCG/ACT nasal spray, 1 spray by Each Nostril route daily Indications: Per [facility] medication list 10/5/2019    Current Hospital Medications:     Scheduled Meds:   sodium chloride flush  5-40 mL IntraVENous 2 times per day    pantoprazole  40 mg IntraVENous BID    And    sodium chloride (PF)  10 mL IntraVENous BID    [START ON 9/26/2021] OXcarbazepine  150 mg Oral Daily with breakfast    prazosin  1 mg Oral Nightly    vitamin D  50,000 Units Oral Weekly    montelukast  10 mg Oral Nightly    atorvastatin  10 mg Oral Daily    citalopram  10 mg Oral Daily    folic acid  1 mg Oral Daily    nicotine  1 patch TransDERmal Q24H    OXcarbazepine  600 mg Oral Nightly    ARIPiprazole  20 mg Oral Nightly     Continuous Infusions:   ipratropium-albuterol      sodium chloride      sodium chloride 75 mL/hr at 09/25/21 1049     PRN Meds:.ipratropium-albuterol, sodium chloride flush, sodium chloride, ondansetron **OR** ondansetron, polyethylene glycol, acetaminophen **OR** acetaminophen, albuterol sulfate HFA, melatonin  .    ipratropium-albuterol      sodium chloride      sodium chloride 75 mL/hr at 09/25/21 1049        Allergies:     No Known Allergies     Review of Systems: Review of Systems   Constitutional: Negative. Negative for diaphoresis and fatigue. HENT: Negative. Eyes: Negative. Respiratory: Positive for shortness of breath. Negative for cough, chest tightness, wheezing and stridor. Cardiovascular: Positive for chest pain. Negative for palpitations and leg swelling. Gastrointestinal: Negative. Negative for blood in stool and nausea. Genitourinary: Negative. Musculoskeletal: Negative. Skin: Negative. Neurological: Positive for weakness. Negative for dizziness, syncope and light-headedness. Hematological: Negative. Psychiatric/Behavioral: Negative. Objective Findings:     Vitals:/85   Pulse 63   Temp 97.9 °F (36.6 °C) (Oral)   Resp 18   Ht 5' 4\" (1.626 m)   Wt 230 lb (104.3 kg)   LMP  (LMP Unknown)   SpO2 98%   BMI 39.48 kg/m²      Physical Examination:    Physical Exam   Constitutional: She appears healthy. No distress. HENT:   Normal cephalic and Atraumatic   Eyes: Pupils are equal, round, and reactive to light. Neck: Thyroid normal. No JVD present. No neck adenopathy. No thyromegaly present. Cardiovascular: Normal rate, regular rhythm, normal heart sounds, intact distal pulses and normal pulses. Pulmonary/Chest: Effort normal and breath sounds normal. She has no wheezes. She has no rales. She exhibits no tenderness. Abdominal: Soft. Bowel sounds are normal. There is no abdominal tenderness. Musculoskeletal:         General: No tenderness or edema. Normal range of motion. Cervical back: Normal range of motion and neck supple. Neurological: She is alert and oriented to person, place, and time. Skin: Skin is warm. No cyanosis. Nails show no clubbing.          Results/ Medications reviewed 9/25/2021, 12:22 PM     Laboratory, Microbiology, Pathology, Radiology, Cardiology, Medications and Transcriptions reviewed  Scheduled Meds:   sodium chloride flush  5-40 mL IntraVENous 2 times per day    pantoprazole  40 mg IntraVENous BID    And    sodium chloride (PF)  10 mL IntraVENous BID    [START ON 9/26/2021] OXcarbazepine  150 mg Oral Daily with breakfast    prazosin  1 mg Oral Nightly    vitamin D  50,000 Units Oral Weekly    montelukast  10 mg Oral Nightly    atorvastatin  10 mg Oral Daily    citalopram  10 mg Oral Daily    folic acid  1 mg Oral Daily    nicotine  1 patch TransDERmal Q24H    OXcarbazepine  600 mg Oral Nightly    ARIPiprazole  20 mg Oral Nightly     Continuous Infusions:   ipratropium-albuterol      sodium chloride      sodium chloride 75 mL/hr at 09/25/21 1049       Recent Labs     09/25/21  0344   WBC 7.6   HGB 7.5*   HCT 23.1*   MCV 76.3*        Recent Labs     09/25/21  0343   *   K 4.0   CL 95   CO2 25   BUN 5*   CREATININE 0.49*     Recent Labs     09/25/21  0343   AST 33   ALT 19   BILITOT <0.2   ALKPHOS 89     No results for input(s): LIPASE, AMYLASE in the last 72 hours. Recent Labs     09/25/21  0343   PROT 6.0*     Echocardiogram complete 2D with doppler with color    Result Date: 9/25/2021  Transthoracic Echocardiography Report (TTE)  Demographics   Patient Name    Payton Boogie Gender                Female   Patient Number  [id number]     Race                                                  Ethnicity   Visit Number    [id number]    Room Number           F745   Corporate ID                 Date of Study         09/25/2021   Accession       [id number]   Referring Physician  Number   Date of Birth   1955   Thor Montiel   Age             78 year(s)   Interpreting          [facility]                               Physician             Hamilton Tatum MD  Procedure Type of Study   TTE procedure:ECHO COMPLETE 2D W/DOP W/COLOR. Procedure Date Date: 09/25/2021 Start: 09:25 AM Study Location: Portable Technical Quality: Good visualization Indications:Heart murmur.  Patient Status: Routine Height: 64 inches Weight: 230 pounds BSA: 2.08 m^2 BMI: 39.48 kg/m^2 BP: 172/54 mmHg  Conclusions   Summary  Aortic valve leaflets are severely thickened. Moderate AS  Normal left ventricle structure and function. Left ventricular ejection fraction is visually estimated at 60%. Pseudonormal filling pattern noted. Signature   ----------------------------------------------------------------  Electronically signed by Олег Chong MD(Interpreting  physician) on 09/25/2021 10:30 AM  ----------------------------------------------------------------   Findings  Left Ventricle Normal left ventricle structure and function. Left ventricular ejection fraction is visually estimated at 60%. Pseudonormal filling pattern noted. Right Ventricle Normal right ventricle structure and function. Normal right ventricle systolic pressure. Left Atrium Normal left atrium. Right Atrium Normal right atrium. Mitral Valve Normal mitral valve structure and function. Tricuspid Valve Normal tricuspid valve structure and function. RSVP 24 mmHg Aortic Valve Aortic valve leaflets are severely thickened. Moderate AS Pulmonic Valve The pulmonic valve was not well visualized . Pericardial Effusion No evidence of pericardial effusion. Pleural Effusion No evidence of pleural effusion. Aorta \ Miscellaneous The aorta is within normal limits. M-Mode Measurements (cm)   LVIDd: 5.02 cm                         LVIDs: 3.94 cm  IVSd: 1.5 cm                           IVSs: 1.91 cm  LVPWd: 1.23 cm                         LVPWs: 1.74 cm  Rt. Vent.  Dimension: 3.11 cm           AO Root Dimension: 2.8 cm                                         ACS: 1.06 cm                                         LA: 4.03 cm                                         LVOT: 1.98 cm  Doppler Measurements:   AV Velocity:0.01 m/s                    MV Peak E-Wave: 1.3 m/s  AV Peak Gradient: 53.83 mmHg            MV Peak A-Wave: 1.17 m/s  AV Mean Gradient: 20.99 mmHg  AV Area (Continuity):1.07 cm^2 MV P1/2t: 72.7 msec  TR Velocity:1.84 m/s                    MVA by PHT3.02 cm^2  TR Gradient:13.51 mmHg                  Estimated RAP:10 mmHg                                          RVSP:23.51 mmHg  Valves  Mitral Valve   Peak E-Wave: 1.3 m/s                  Peak A-Wave: 1.17 m/s  P1/2t: 72.7 msec                      E/A Ratio: 1.11  Mean Velocity: 0.56 m/s               Peak Gradient: 6.74 mmHg  Mean Gradient: 1.85 mmHg              Deceleration Time: 236.3 msec  Area (PHT): 3.02 cm^2                 Area (continuity): 2.18 cm^2   Tissue Doppler   E' Septal Velocity: 0.06 m/s  E' Lateral Velocity: 0.08 m/s   Aortic Valve   Peak Velocity: 3.67 m/s               Mean Velocity: 2.12 m/s  Peak Gradient: 53.83 mmHg             Mean Gradient: 20.99 mmHg  Area (continuity): 1.07 cm^2          Area (2D): 2.09 cm^2  AV VTI: 83.23 cm   Cusp Separation: 1.06 cm   Tricuspid Valve   Estimated RVSP: 23.51 mmHg              Estimated RAP: 10 mmHg  TR Velocity: 1.84 m/s                   TR Gradient: 13.51 mmHg   Pulmonic Valve   Peak Velocity: 1.17 m/s           Peak Gradient: 5.47 mmHg                                    Estimated PASP: 23.51 mmHg   LVOT   Peak Velocity: 1 m/s               Mean Velocity: 0.78 m/s  Peak Gradient: 4 mmHg              Mean Gradient: 2.59 mmHg  LVOT Diameter: 1.98 cm             LVOT VTI: 28.83 cm  Structures  Left Atrium   LA Dimension: 4.03 cm                        LA Area: 21.97 cm^2  LA/Aorta: 1.44  LA Volume/Index: 61.44 ml /30 m^2   Left Ventricle   Diastolic Dimension: 0.09 cm          Systolic Dimension: 1.46 cm  Septum Diastolic: 1.5 cm              Septum Systolic: 8.17 cm  PW Diastolic: 1.37 cm                 PW Systolic: 3.68 cm                                        FS: 21.5 %  LV EDV/LV EDV Index: 119.12 ml/57 m^2 LV ESV/LV ESV Index: 67.36 ml/32 m^2  EF Calculated: 43.5 %                 LV Length: 8.55 cm   LVOT Diameter: 1.98 cm   Right Atrium   RA Systolic Pressure: 10 mmHg Right Ventricle   Diastolic Dimension: 0.18 cm                                    RV Systolic Pressure: 85.13 mmHg  Aorta/ Miscellaneous Aorta   Aortic Root: 2.8 cm  LVOT Diameter: 1.98 cm        Active Hospital Problems    Diagnosis Date Noted    GI bleed [K92.2] 09/25/2021     Priority: Low         Impression/Plan:   1. GIB/ANemia- she can proceed with GI w/u- discussed with Dr. Claudell Goodnight   2. CP- atyical noncardiac  3. LVEF 60%  4. Moderate AS- will continue to follow this as out pt.   5. HTN Stable on meds. Thank you for allowing us to participate in the care of this patient. Will continue to follow. Please call if questions or concerns arise.     Electronically signed by Lottie Cuellar MD on 9/25/2021 at 12:22 PM

## 2021-09-25 NOTE — CONSULTS
Consults    Patient Name: Dawna Toney  Admit Date: 2021  2:54 AM  MR #: [id number]  : 1955    Attending Physician: Jose Mooney MD  Reason for consult: Anemia and history of occasional rectal bleeding    History of Presenting Illness:      Dawna Toney is a 78 y.o. female on hospital day 0 with a history of chest pain. GI consult was requested because of anemia and history of occasional rectal bleeding, patient had a colonoscopy in the past and last colonoscopy was several years ago and patient was advised to get another colonoscopy. She reports occasional rectal bleeding that she attributes to hemorrhoids. Reports occasional diarrhea. She also has chest pain and also epigastric discomfort, no nausea no vomiting no dysphagia history of occasional GERD symptoms, she does not take any NSAIDs, does not smoke does not drink alcohol.   History Obtained From:  patient      History:      Past Medical History:   Diagnosis Date    Allergic rhinitis     Bipolar 1 disorder ([facility] [address]     Depression     Hyperlipidemia     Hypertension     Irritable bowel syndrome     PTSD (post-traumatic stress disorder)      Past Surgical History:   Procedure Laterality Date    CHOLECYSTECTOMY      HYSTERECTOMY         Family History  Family History   Problem Relation Age of Onset    Cancer Father         Throat     [] Unable to obtain due to ventilated and/ or neurologic status    Social History     Socioeconomic History    Marital status:      Spouse name: Not on file    Number of children: Not on file    Years of education: Not on file    Highest education level: Not on file   Occupational History    Not on file   Tobacco Use    Smoking status: Current Every Day Smoker     Packs/day: 1.00    Smokeless tobacco: Never Used    Tobacco comment: 1-2 ppd   Vaping Use    Vaping Use: Never used   Substance and Sexual Activity    Alcohol use: Yes     Comment: socially    Drug use: No    Sexual activity: Not on file   Other Topics Concern    Not on file   Social History Narrative    Not on file     Social Determinants of Health     Financial Resource Strain:     Difficulty of Paying Living Expenses:    Food Insecurity:     Worried About Running Out of Food in the Last Year:     [address] in the Last Year:    Transportation Needs:     Lack of Transportation (Medical):      Lack of Transportation (Non-Medical):    Physical Activity:     Days of Exercise per Week:     Minutes of Exercise per Session:    Stress:     Feeling of Stress :    Social Connections:     Frequency of Communication with Friends and Family:     Frequency of Social Gatherings with Friends and Family:     Attends [employer] Services:     Active Member of Clubs or Organizations:     Attends Club or Organization Meetings:     Marital Status:    Intimate Partner Violence:     Fear of Current or Ex-Partner:     Emotionally Abused:     Physically Abused:     Sexually Abused:       [] Unable to obtain due to ventilated and/ or neurologic status      Home Medications:      Medications Prior to Admission: insulin aspart (NOVOLOG) 100 UNIT/ML injection vial, Inject into the skin 4 times daily (before meals and nightly) 151-200=2 units 201-250=4 units 251-300=6 units 301-350=8 units 351-400= 10 units >401 recheck and notify provider  naloxone (NARCAN) 0.4 MG/ML injection, Infuse 0.4 mg intravenously as needed  Melatonin 3-10 MG TABS, Take 3 mg by mouth nightly as needed (for insomnia)  Glucagon, rDNA, (GLUCAGON EMERGENCY) 1 MG KIT, Inject 1 mg as directed as needed (low blood sugar)  acetaminophen (TYLENOL) 325 MG tablet, Take 650 mg by mouth every 6 hours as needed for Pain or Fever  prazosin (MINIPRESS) 1 MG capsule, Take 1 mg by mouth nightly  vitamin D (ERGOCALCIFEROL) 1.25 MG (29662 UT) CAPS capsule, Take 50,000 Units by mouth once a week Every wednesday  atorvastatin (LIPITOR) 10 MG tablet, Take 10 mg by mouth daily  folic acid (FOLVITE) 1 MG tablet, Take 1 mg by mouth daily  metFORMIN (GLUCOPHAGE) 500 MG tablet, Take 500 mg by mouth 3 times daily (with meals)  montelukast (SINGULAIR) 10 MG tablet, Take 10 mg by mouth nightly  nicotine (NICODERM CQ) 21 MG/24HR, Place 1 patch onto the skin every 24 hours  OXcarbazepine (TRILEPTAL) 150 MG tablet, Take 150 mg by mouth daily (with breakfast)  loperamide (IMODIUM) 2 MG capsule, Take 2 mg by mouth 4 times daily as needed for Diarrhea  albuterol sulfate HFA (VENTOLIN HFA) 108 (90 Base) MCG/ACT inhaler, Inhale 2 puffs into the lungs 4 times daily as needed for Wheezing  OXcarbazepine (TRILEPTAL) 300 MG tablet, Take 1 tablet by mouth 2 times daily  citalopram (CELEXA) 10 MG tablet, Take 1 tablet by mouth daily  ARIPiprazole (ABILIFY) 15 MG tablet, Take 1 tablet by mouth nightly  metoprolol tartrate (LOPRESSOR) 25 MG tablet, Take 1 tablet by mouth daily  ARIPiprazole ER (ABILIFY MAINTENA) 400 MG PRSY, Inject 300 mg into the muscle every 30 days  pravastatin (PRAVACHOL) 20 MG tablet, Take 1 tablet by mouth nightly  fluticasone (FLONASE) 50 MCG/ACT nasal spray, 1 spray by Each Nostril route daily Indications: Per [facility] medication list 10/5/2019    Current Hospital Medications:     Scheduled Meds:   sodium chloride flush  5-40 mL IntraVENous 2 times per day    pantoprazole  40 mg IntraVENous BID    And    sodium chloride (PF)  10 mL IntraVENous BID    [START ON 9/26/2021] OXcarbazepine  150 mg Oral Daily with breakfast    prazosin  1 mg Oral Nightly    vitamin D  50,000 Units Oral Weekly    montelukast  10 mg Oral Nightly    atorvastatin  10 mg Oral Daily    citalopram  10 mg Oral Daily    folic acid  1 mg Oral Daily    nicotine  1 patch TransDERmal Q24H    OXcarbazepine  600 mg Oral Nightly    ARIPiprazole  20 mg Oral Nightly     Continuous Infusions:   ipratropium-albuterol      sodium chloride      sodium chloride 75 mL/hr at 09/25/21 1049     PRN Meds:.ipratropium-albuterol, sodium chloride flush, sodium chloride, ondansetron **OR** ondansetron, polyethylene glycol, acetaminophen **OR** acetaminophen, albuterol sulfate HFA, melatonin  .  ipratropium-albuterol      sodium chloride      sodium chloride 75 mL/hr at 09/25/21 1049        Allergies:     No Known Allergies     Review of Systems:       [x] CV, Resp, Neuro, , and all other systems reviewed and negative other than listed in HPI.      [] Unable to obtain due to ventilated and/ or neurologic status      Objective Findings:     Vitals:   Vitals:    09/25/21 0500 09/25/21 0600 09/25/21 0645 09/25/21 0744   BP: 113/73 (!) 145/54 (!) 172/54 123/85   Pulse: 62 61 61 63   Resp: 15 18 16 18   Temp:    97.9 °F (36.6 °C)   TempSrc:    Oral   SpO2: 94% 97% 95% 98%   Weight:       Height:            Physical Examination:  General: In no acute distress  HEENT: Normocephalic,  scleral icterus. None  Neck: No jugular venous distention. Heart: Regular, no murmur, no rub/gallop. No right ventricular heave. Lungs: Clear to ascultation, no rales/wheezing/rhonchi. Good chest wall excursion. Abdomen: Obese soft nontender no palpable mass surgical scar seen  Extremities: No clubbing/cyanosis, no edema. Skin: Warm, dry, normal turgor, no rash, no bruise, no petichiae. Neuro: No myoclonus or tremor.    Psych: Normal affect    Results/ Medications reviewed 9/25/2021, 12:12 PM     Laboratory, Microbiology, Pathology, Radiology, Cardiology, Medications and Transcriptions reviewed  Scheduled Meds:   sodium chloride flush  5-40 mL IntraVENous 2 times per day    pantoprazole  40 mg IntraVENous BID    And    sodium chloride (PF)  10 mL IntraVENous BID    [START ON 9/26/2021] OXcarbazepine  150 mg Oral Daily with breakfast    prazosin  1 mg Oral Nightly    vitamin D  50,000 Units Oral Weekly    montelukast  10 mg Oral Nightly    atorvastatin  10 mg Oral Daily    citalopram  10 mg Oral Daily    folic acid  1 mg Oral Daily    nicotine  1 patch TransDERmal Q24H    OXcarbazepine  600 mg Oral Nightly    ARIPiprazole  20 mg Oral Nightly     Continuous Infusions:   ipratropium-albuterol      sodium chloride      sodium chloride 75 mL/hr at 09/25/21 1049       Recent Labs     09/25/21  0344   WBC 7.6   HGB 7.5*   HCT 23.1*   MCV 76.3*        Recent Labs     09/25/21  0343   *   K 4.0   CL 95   CO2 25   BUN 5*   CREATININE 0.49*     Recent Labs     09/25/21  0343   AST 33   ALT 19   BILITOT <0.2   ALKPHOS 89     No results for input(s): LIPASE, AMYLASE in the last 72 hours. Recent Labs     09/25/21  0343   PROT 6.0*     Echocardiogram complete 2D with doppler with color    Result Date: 9/25/2021  Transthoracic Echocardiography Report (TTE)  Demographics   Patient Name    Denver Said Gender                Female   Patient Number  [id number]     Race                                                  Ethnicity   Visit Number    [id number]    Room Number           P450   Corporate ID                 Date of Study         09/25/2021   Accession       [id number]   Referring Physician  Number   Date of Birth   1955   Sherryle Elks   Age             78 year(s)   Interpreting          [facility]) Cardiology                               Physician             Sandra Flaherty MD  Procedure Type of Study   TTE procedure:ECHO COMPLETE 2D W/DOP W/COLOR. Procedure Date Date: 09/25/2021 Start: 09:25 AM Study Location: Portable Technical Quality: Good visualization Indications:Heart murmur. Patient Status: Routine Height: 64 inches Weight: 230 pounds BSA: 2.08 m^2 BMI: 39.48 kg/m^2 BP: 172/54 mmHg  Conclusions   Summary  Aortic valve leaflets are severely thickened. Moderate AS  Normal left ventricle structure and function. Left ventricular ejection fraction is visually estimated at 60%. Pseudonormal filling pattern noted.    Signature   ----------------------------------------------------------------  Electronically signed by Dariel Kerns MD(Interpreting  physician) on 09/25/2021 10:30 AM  ----------------------------------------------------------------   Findings  Left Ventricle Normal left ventricle structure and function. Left ventricular ejection fraction is visually estimated at 60%. Pseudonormal filling pattern noted. Right Ventricle Normal right ventricle structure and function. Normal right ventricle systolic pressure. Left Atrium Normal left atrium. Right Atrium Normal right atrium. Mitral Valve Normal mitral valve structure and function. Tricuspid Valve Normal tricuspid valve structure and function. RSVP 24 mmHg Aortic Valve Aortic valve leaflets are severely thickened. Moderate AS Pulmonic Valve The pulmonic valve was not well visualized . Pericardial Effusion No evidence of pericardial effusion. Pleural Effusion No evidence of pleural effusion. Aorta \ Miscellaneous The aorta is within normal limits. M-Mode Measurements (cm)   LVIDd: 5.02 cm                         LVIDs: 3.94 cm  IVSd: 1.5 cm                           IVSs: 1.91 cm  LVPWd: 1.23 cm                         LVPWs: 1.74 cm  Rt. Vent.  Dimension: 3.11 cm           AO Root Dimension: 2.8 cm                                         ACS: 1.06 cm                                         LA: 4.03 cm                                         LVOT: 1.98 cm  Doppler Measurements:   AV Velocity:0.01 m/s                    MV Peak E-Wave: 1.3 m/s  AV Peak Gradient: 53.83 mmHg            MV Peak A-Wave: 1.17 m/s  AV Mean Gradient: 20.99 mmHg  AV Area (Continuity):1.07 cm^2          MV P1/2t: 72.7 msec  TR Velocity:1.84 m/s                    MVA by PHT3.02 cm^2  TR Gradient:13.51 mmHg                  Estimated RAP:10 mmHg                                          RVSP:23.51 mmHg  Valves  Mitral Valve   Peak E-Wave: 1.3 m/s                  Peak A-Wave: 1.17 m/s  P1/2t: 72.7 msec                      E/A Ratio: 1.11  Mean Velocity: 0.56 m/s               Peak Gradient: spoke to Dr. Gustabo Louis who evaluated from cardiac standpoint and patient has been cleared for endoscopic studies. Plan: Will schedule EGD and colonoscopy. .  Comments: Thank you for allowing us to participate in the care of this patient. Will continue to follow. Please call if questions or concerns arise.     Electronically signed by Kalina Aquino MD on 9/25/2021 at 12:12 PM

## 2021-09-25 NOTE — H&P
Hospitalist Group   History and Physical      CHIEF COMPLAINT: Chest pain    History of Present Illness:  78 y.o. female with a history of hypertension, hyperlipidemia, bipolar presents from nursing home with chest pain. Patient said that her pain started around midnight. She describes it as chest pressure and associated with difficulty breathing. Currently the pain is resolved. No nausea or vomiting. She mentioned history of similar pain in the past that they told her it was indigestion. She denied history of GI bleed. He denied noticing any blood in the stool or black stool. She said in the past she had blood per rectum but that was due to hemorrhoids. Patient mentioned that she had colonoscopy long time ago and she was supposed to follow-up with another colonoscopy but she never did. Patient denied history of CAD but she has valvular problem. She is not on anticoagulation or antiplatelets. REVIEW OF SYSTEMS:  no fevers, chills, had cp, and sob, no n/v, ha, vision/hearing changes, wt changes, hot/cold flashes, other open skin lesions, diarrhea, constipation, dysuria/hematuria unless noted in HPI. Complete ROS performed with the patient and is otherwise negative. PMH:  Past Medical History:   Diagnosis Date    Allergic rhinitis     Bipolar 1 disorder ([facility] [address]     Depression     Hyperlipidemia     Hypertension     Irritable bowel syndrome     PTSD (post-traumatic stress disorder)        Surgical History:  Past Surgical History:   Procedure Laterality Date    CHOLECYSTECTOMY      HYSTERECTOMY         Medications Prior to Admission:    Prior to Admission medications    Medication Sig Start Date End Date Taking?  Authorizing Provider   albuterol sulfate HFA (VENTOLIN HFA) 108 (90 Base) MCG/ACT inhaler Inhale 2 puffs into the lungs 4 times daily as needed for Wheezing 3/1/21   Edel Ray MD   OXcarbazepine (TRILEPTAL) 300 MG tablet Take 1 tablet by mouth 2 times daily 1/21/20   Rhoda Zavala Daniel Armas MD   citalopram (CELEXA) 10 MG tablet Take 1 tablet by mouth daily 1/21/20   Korina Trinidad MD   ARIPiprazole (ABILIFY) 15 MG tablet Take 1 tablet by mouth nightly 1/21/20   Korina Trinidad MD   ARIPiprazole ER (ABILIFY MAINTENA) 400 MG PRSY Inject 300 mg into the muscle every 30 days 2/18/20   Korina Trinidad MD   metoprolol tartrate (LOPRESSOR) 25 MG tablet Take 1 tablet by mouth daily 1/21/20   Korina Trinidad MD   pravastatin (PRAVACHOL) 20 MG tablet Take 1 tablet by mouth nightly 1/21/20   Korina Trinidad MD   fluticasone (FLONASE) 50 MCG/ACT nasal spray 1 spray by Each Nostril route daily Indications: Per [facility] medication list 10/5/2019    Historical Provider, MD       Allergies:    Patient has no known allergies. Social History:    reports that she has been smoking. She has been smoking about 1.00 pack per day. She has never used smokeless tobacco. She reports current alcohol use. She reports that she does not use drugs.     Family History:       Problem Relation Age of Onset    Cancer Father         Throat       PHYSICAL EXAM:  Vitals:  /73   Pulse 62   Temp 97.9 °F (36.6 °C) (Oral)   Resp 15   Ht 5' 4\" (1.626 m)   Wt 230 lb (104.3 kg)   LMP  (LMP Unknown)   SpO2 94%   BMI 39.48 kg/m²   General Appearance: alert and oriented to person, place and time, well developed and well- nourished, in no acute distress  Skin: warm and dry, no rash or erythema  Head: normocephalic and atraumatic  Eyes: pupils equal, round, and reactive to light, extraocular eye movements intact, conjunctivae normal  ENT: tympanic membrane, external ear and ear canal normal bilaterally, nose without deformity, nasal mucosa and turbinates normal without polyps  Neck: supple and non-tender without mass, no thyromegaly or thyroid nodules, no cervical lymphadenopathy  Pulmonary/Chest: clear to auscultation bilaterally- no wheezes   Cardiovascular: normal rate, regular rhythm, normal S1 and S2, systolic murmur  Abdomen: soft, non-tender, non-distended, normal bowel sounds, no masses or organomegaly  Extremities: no cyanosis, clubbing or edema  Musculoskeletal: normal range of motion, no joint swelling, deformity or tenderness  Neurologic: reflexes normal and symmetric, no cranial nerve deficit,  coordination and speech normal       LABS:  Recent Labs     09/25/21  0343   *   K 4.0   CL 95   CO2 25   BUN 5*   CREATININE 0.49*   GLUCOSE 111*   CALCIUM 8.8       Recent Labs     09/25/21  0344   WBC 7.6   RBC 3.02*   HGB 7.5*   HCT 23.1*   MCV 76.3*   MCH 24.8*   MCHC 32.5*   RDW 15.7*          No results for input(s): POCGLU in the last 72 hours. CBC with Differential:    Lab Results   Component Value Date    WBC 7.6 09/25/2021    RBC 3.02 09/25/2021    RBC 4.60 06/11/2018    HGB 7.5 09/25/2021    HCT 23.1 09/25/2021     09/25/2021    MCV 76.3 09/25/2021    MCH 24.8 09/25/2021    MCHC 32.5 09/25/2021    RDW 15.7 09/25/2021    NRBC 0.0 06/11/2018    LYMPHOPCT 37.5 09/25/2021    MONOPCT 6.8 09/25/2021    BASOPCT 0.5 09/25/2021    MONOSABS 0.5 09/25/2021    LYMPHSABS 2.8 09/25/2021    EOSABS 0.1 09/25/2021    BASOSABS 0.0 09/25/2021     CMP:    Lab Results   Component Value Date     09/25/2021    K 4.0 09/25/2021    K 4.1 03/03/2019    CL 95 09/25/2021    CO2 25 09/25/2021    BUN 5 09/25/2021    CREATININE 0.49 09/25/2021    GFRAA >60.0 09/25/2021    AGRATIO 1.4 06/09/2018    LABGLOM >60.0 09/25/2021    GLUCOSE 111 09/25/2021    GLUCOSE 147 06/11/2018    PROT 6.0 09/25/2021    LABALBU 3.6 09/25/2021    LABALBU 3.8 06/09/2018    CALCIUM 8.8 09/25/2021    BILITOT <0.2 09/25/2021    ALKPHOS 89 09/25/2021    AST 33 09/25/2021    ALT 19 09/25/2021       Radiology: No results found. EKG: Sinus bradycardia    ASSESSMENT/ PLAN[de-identified]      Active Problems:    GI bleed  Resolved Problems:    * No resolved hospital problems. *      1.  Chest pain   Parasternal chest pressure occurred at rest and associated

## 2021-09-25 NOTE — PROGRESS NOTES
Progress Note  Date:2021       Room:[place] 908731-01  Patient Nolan Fonseca     YOB: 1955     Age:66 y.o. Subjective    Subjective:  Symptoms:  She reports malaise, chest pain, weakness and chest pressure. No shortness of breath, cough, headache, anorexia, diarrhea or anxiety. Diet:  No nausea or vomiting. Review of Systems   Respiratory: Negative for cough and shortness of breath. Cardiovascular: Positive for chest pain. Gastrointestinal: Negative for anorexia, diarrhea, nausea and vomiting. Neurological: Positive for weakness. Objective         Vitals Last 24 Hours:  TEMPERATURE:  Temp  Av.9 °F (36.6 °C)  Min: 97.9 °F (36.6 °C)  Max: 97.9 °F (36.6 °C)  RESPIRATIONS RANGE: Resp  Av.6  Min: 14  Max: 18  PULSE OXIMETRY RANGE: SpO2  Av.8 %  Min: 94 %  Max: 100 %  PULSE RANGE: Pulse  Av.4  Min: 51  Max: 63  BLOOD PRESSURE RANGE: Systolic (83OVY), DW , Min:107 , JQQ:927   ; Diastolic (34CPE), LM, Min:54, Max:85    I/O (24Hr): No intake or output data in the 24 hours ending 21 1029  Objective:  General Appearance:  Comfortable, well-appearing and in no acute distress. Vital signs: (most recent): Blood pressure 123/85, pulse 63, temperature 97.9 °F (36.6 °C), resp. rate 18, height 5' 4\" (1.626 m), weight 230 lb (104.3 kg), SpO2 98 %, not currently breastfeeding. HEENT: Normal HEENT exam.    Lungs:  Normal effort. Heart: S1 normal and S2 normal.    Abdomen: Abdomen is soft. Bowel sounds are normal.   There is no epigastric area or suprapubic area tenderness. Pulses: Distal pulses are intact. Neurological: Patient is alert and oriented to person, place and time. Pupils:  Pupils are equal, round, and reactive to light. Skin:  Warm and dry.       Labs/Imaging/Diagnostics    Labs:  CBC:Recent Labs     21  0344   WBC 7.6   RBC 3.02*   HGB 7.5*   HCT 23.1*   MCV 76.3*   RDW 15.7*        CHEMISTRIES:  Recent Labs 09/25/21  0343   *   K 4.0   CL 95   CO2 25   BUN 5*   CREATININE 0.49*   GLUCOSE 111*     PT/INR:No results for input(s): PROTIME, INR in the last 72 hours. APTT:No results for input(s): APTT in the last 72 hours. LIVER PROFILE:  Recent Labs     09/25/21  0343   AST 33   ALT 19   BILITOT <0.2   ALKPHOS 89       Imaging Last 24 Hours:  No results found. Assessment//Plan           Hospital Problems         Last Modified POA    GI bleed 9/25/2021 Yes      chest pain  Acute post hemmorhagic anemia  Tobacco use  HTN  Assessment & Plan  9/25: Home meds reviewed with cardiologist and reordered. Patient waiting for GI and cardiology evaluation. Troponins negative. Monitor hemoglobin closely if less than 7 will transfuse. NicoDerm patch. Start clear liquid diet. If is okay with GI advance diet as tolerated. Patient needs endoscopy evaluation as inpt inpatient, spoke with nursing about the care plan.   Electronically signed by Toña Turner MD on 9/25/21 at 10:29 AM EDT

## 2021-09-25 NOTE — CARE COORDINATION
[facility] Case Management Initial Discharge Assessment    Met with Patient to discuss discharge plan. PCP: Jacobo Leong                                Date of Last Visit: August 2021    If no PCP, list provided? N/A    Discharge Planning    Living Arrangements: independently at home    Who do you live with? Mom and Sister lives with patient. Who helps you with your care:  self    If lives at home:     Do you have any barriers navigating in your home? no    Patient can perform ADL? Yes    Current Services (outpatient and in home) :  Patient is currently a patient of the [facility]. Patient was transferred to hospital from [facility]. Dialysis: No    Is transportation available to get to your appointments? Yes    DME Equipment:  No    Respiratory equipment: None    Respiratory provider:  no     Pharmacy:  yes -  [address] [place] [address] with Medication Assistance Program?  No      Patient agreeable to [place] [address]? No    Patient agreeable to SNF/Rehab? No    Other discharge needs identified? N/A    Does Patient Have a High-Risk for Readmission Diagnosis (CHF, PN, MI, COPD)? No         Initial Discharge Plan? (Note: please see concurrent daily documentation for any updates after initial note). Patient was admitted from [facility]. She is a current [facility] patient. Patient is looking forward to returning to her home in [place]. Her mom and sister lives with her. Readmission Risk              Risk of Unplanned Readmission:  13       DCCOP completed.   Electronically signed by PHAN Russo, [place] on 9/25/2021 at 1:43 PM

## 2021-09-25 NOTE — ED NOTES
Into pt's room. Pt states she does not want me to change her out of her brief. States she will just Loyd in it. \"      Johan Callejas, DEVYN  09/25/21 [id number]

## 2021-09-25 NOTE — PROGRESS NOTES
Admission assessment complete. VSS. Pt is Axox4. Lungs diminished. abd soft, rounded. Bowel sounds hypoactive. Meds given per mar. Edema in BLE, +2. Call light within reach. Will continue to monitor.      Electronically signed by Benny Monroy RN on 9/25/2021 at 11:06 AM

## 2021-09-25 NOTE — PROGRESS NOTES
[facility] Respiratory Therapy Evaluation   Current Order:  Albuterol MDI 4 times daily PRN      Home Regimen: PRN      Ordering Physician: Elba Bahena  Re-evaluation Date:  eval done     Diagnosis: SOB      Patient Status: Stable / Unstable + Physician notified    The following MDI Criteria must be met in order to convert aerosol to MDI with spacer. If unable to meet, MDI will be converted to aerosol:  []  Patient able to demonstrate the ability to use MDI effectively  []  Patient alert and cooperative  []  Patient able to take deep breath with 5-10 second hold  []  Medication(s) available in this delivery method   []  Peak flow greater than or equal to 200 ml/min            Current Order Substituted To  (same drug, same frequency)   Aerosol to MDI [] Albuterol Sulfate 0.083% unit dose by aerosol Albuterol Sulfate MDI 2 puffs by inhalation with spacer    [] Levalbuterol 1.25 mg unit dose by aerosol Levalbuterol MDI 2 puffs by inhalation with spacer    [] Levalbuterol 0.63 mg unit dose by aerosol Levalbuterol MDI 2 puffs by inhalation with spacer    [] Ipratropium Bromide 0.02% unit dose by aerosol Ipratropium Bromide MDI 2 puffs by inhalation with spacer    [] Duoneb (Ipratropium + Albuterol) unit dose by aerosol Ipratropium MDI + Albuterol MDI 2 puffs by inhalation w/spacer   MDI to Aerosol [] Albuterol Sulfate MDI Albuterol Sulfate 0.083% unit dose by aerosol    [] Levalbuterol MDI 2 puffs by inhalation Levalbuterol 1.25 mg unit dose by aerosol    [] Ipratropium Bromide MDI by inhalation Ipratropium Bromide 0.02% unit dose by aerosol    [] Combivent (Ipratropium + Albuterol) MDI by inhalation Duoneb (Ipratropium + Albuterol) unit dose by aerosol       Treatment Assessment [Frequency/Schedule]:  Change frequency to: ______________________no change____________________________per Protocol, P&T, MEC      Points 0 1 2 3 4   Pulmonary Status  Non-Smoker  []   Smoking history   < 20 pack years  []   Smoking history  ?  21 pack years  [x]   Pulmonary Disorder  (acute or chronic)  []   Severe or Chronic w/ Exacerbation  []     Surgical Status No [x]   Surgeries     General []   Surgery Lower []   Abdominal Thoracic or []   Upper Abdominal Thoracic with  PulmonaryDisorder  []     Chest X-ray Clear/Not  Ordered     []  Chronic Changes  Results Pending  [x]  Infiltrates, atelectasis, pleural effusion, or edema  []  Infiltrates in more than one lobe []  Infiltrate + Atelectasis, &/or pleural effusion  []    Respiratory Pattern Regular,  RR = 12-20 [x]  Increased,  RR = 21-25 []  MARLOW, irregular,  or RR = 26-30 []  Decreased FEV1  or RR = 31-35 []  Severe SOB, use  of accessory muscles, or RR ? 35  []    Mental Status Alert, oriented,  Cooperative [x]  Confused but Follows commands []  Lethargic or unable to follow commands []  Obtunded  []  Comatose  []    Breath Sounds Clear to  auscultation  [x]  Decreased unilaterally or  in bases only []  Decreased  bilaterally  []  Crackles or intermittent wheezes []  Wheezes []    Cough Strong, Spontan., & nonproductive [x]  Strong,  spontaneous, &  productive []  Weak,  Nonproductive []  Weak, productive or  with wheezes []  No spontaneous  cough or may require suctioning []    Level of Activity Ambulatory []  Ambulatory w/ Assist  [x]  Non-ambulatory []  Paraplegic []  Quadriplegic []    Total    Score:___4___     Triage Score:___5_____      Tri       Triage:     1. (>20) Freq: Q3    2. (16-20) Freq: Q4   3. (11-15) Freq: QID & Albuterol Q2 PRN    4. (6-10) Freq: TID & Albuterol Q2 PRN    5. (0-5) Freq Q4prn

## 2021-09-26 ENCOUNTER — ANESTHESIA EVENT (OUTPATIENT)
Dept: OPERATING ROOM | Age: 66
DRG: 379 | End: 2021-09-26
Payer: MEDICARE

## 2021-09-26 ENCOUNTER — ANESTHESIA (OUTPATIENT)
Dept: OPERATING ROOM | Age: 66
DRG: 379 | End: 2021-09-26
Payer: MEDICARE

## 2021-09-26 VITALS
HEIGHT: 64 IN | WEIGHT: 230 LBS | BODY MASS INDEX: 39.27 KG/M2 | TEMPERATURE: 97.2 F | DIASTOLIC BLOOD PRESSURE: 69 MMHG | HEART RATE: 93 BPM | OXYGEN SATURATION: 96 % | RESPIRATION RATE: 16 BRPM | SYSTOLIC BLOOD PRESSURE: 149 MMHG

## 2021-09-26 VITALS — OXYGEN SATURATION: 96 % | SYSTOLIC BLOOD PRESSURE: 173 MMHG | DIASTOLIC BLOOD PRESSURE: 72 MMHG

## 2021-09-26 LAB
BACTERIA: ABNORMAL /HPF
BILIRUBIN URINE: NEGATIVE
BLOOD, URINE: ABNORMAL
CLARITY: CLEAR
COLOR: YELLOW
EPITHELIAL CELLS, UA: ABNORMAL /HPF (ref 0–5)
GLUCOSE URINE: NEGATIVE MG/DL
HYALINE CASTS: ABNORMAL /HPF (ref 0–5)
KETONES, URINE: NEGATIVE MG/DL
LEUKOCYTE ESTERASE, URINE: ABNORMAL
NITRITE, URINE: NEGATIVE
PH UA: 5 (ref 5–9)
PROTEIN UA: NEGATIVE MG/DL
RBC UA: ABNORMAL /HPF (ref 0–5)
SPECIFIC GRAVITY UA: 1 (ref 1–1.03)
UROBILINOGEN, URINE: 0.2 E.U./DL
WBC UA: ABNORMAL /HPF (ref 0–5)

## 2021-09-26 PROCEDURE — G0378 HOSPITAL OBSERVATION PER HR: HCPCS

## 2021-09-26 PROCEDURE — 6360000002 HC RX W HCPCS: Performed by: ANESTHESIOLOGY

## 2021-09-26 PROCEDURE — 7100000001 HC PACU RECOVERY - ADDTL 15 MIN: Performed by: SPECIALIST

## 2021-09-26 PROCEDURE — 7100000000 HC PACU RECOVERY - FIRST 15 MIN: Performed by: SPECIALIST

## 2021-09-26 PROCEDURE — 2580000003 HC RX 258: Performed by: SPECIALIST

## 2021-09-26 PROCEDURE — 2580000003 HC RX 258: Performed by: ANESTHESIOLOGY

## 2021-09-26 PROCEDURE — 3609010300 HC COLONOSCOPY W/BIOPSY SINGLE/MULTIPLE: Performed by: SPECIALIST

## 2021-09-26 PROCEDURE — 88305 TISSUE EXAM BY PATHOLOGIST: CPT

## 2021-09-26 PROCEDURE — 3700000000 HC ANESTHESIA ATTENDED CARE: Performed by: SPECIALIST

## 2021-09-26 PROCEDURE — 87186 SC STD MICRODIL/AGAR DIL: CPT

## 2021-09-26 PROCEDURE — 81001 URINALYSIS AUTO W/SCOPE: CPT

## 2021-09-26 PROCEDURE — 99232 SBSQ HOSP IP/OBS MODERATE 35: CPT | Performed by: INTERNAL MEDICINE

## 2021-09-26 PROCEDURE — 45380 COLONOSCOPY AND BIOPSY: CPT | Performed by: SPECIALIST

## 2021-09-26 PROCEDURE — 87077 CULTURE AEROBIC IDENTIFY: CPT

## 2021-09-26 PROCEDURE — 2709999900 HC NON-CHARGEABLE SUPPLY: Performed by: SPECIALIST

## 2021-09-26 PROCEDURE — 43239 EGD BIOPSY SINGLE/MULTIPLE: CPT | Performed by: SPECIALIST

## 2021-09-26 PROCEDURE — 88342 IMHCHEM/IMCYTCHM 1ST ANTB: CPT

## 2021-09-26 PROCEDURE — 93005 ELECTROCARDIOGRAM TRACING: CPT | Performed by: INTERNAL MEDICINE

## 2021-09-26 PROCEDURE — 3700000001 HC ADD 15 MINUTES (ANESTHESIA): Performed by: SPECIALIST

## 2021-09-26 PROCEDURE — 87086 URINE CULTURE/COLONY COUNT: CPT

## 2021-09-26 PROCEDURE — 0DB68ZX EXCISION OF STOMACH, VIA NATURAL OR ARTIFICIAL OPENING ENDOSCOPIC, DIAGNOSTIC: ICD-10-PCS | Performed by: SPECIALIST

## 2021-09-26 PROCEDURE — 6370000000 HC RX 637 (ALT 250 FOR IP): Performed by: INTERNAL MEDICINE

## 2021-09-26 PROCEDURE — 3609012400 HC EGD TRANSORAL BIOPSY SINGLE/MULTIPLE: Performed by: SPECIALIST

## 2021-09-26 PROCEDURE — 0DBK8ZX EXCISION OF ASCENDING COLON, VIA NATURAL OR ARTIFICIAL OPENING ENDOSCOPIC, DIAGNOSTIC: ICD-10-PCS | Performed by: SPECIALIST

## 2021-09-26 RX ORDER — NITROFURANTOIN 25; 75 MG/1; MG/1
100 CAPSULE ORAL EVERY 12 HOURS SCHEDULED
Status: DISCONTINUED | OUTPATIENT
Start: 2021-09-26 | End: 2021-09-26 | Stop reason: HOSPADM

## 2021-09-26 RX ORDER — MAGNESIUM HYDROXIDE 1200 MG/15ML
LIQUID ORAL PRN
Status: DISCONTINUED | OUTPATIENT
Start: 2021-09-26 | End: 2021-09-26 | Stop reason: ALTCHOICE

## 2021-09-26 RX ORDER — HYDROCODONE BITARTRATE AND ACETAMINOPHEN 5; 325 MG/1; MG/1
1 TABLET ORAL PRN
Status: DISCONTINUED | OUTPATIENT
Start: 2021-09-26 | End: 2021-09-26 | Stop reason: HOSPADM

## 2021-09-26 RX ORDER — NITROFURANTOIN 25; 75 MG/1; MG/1
100 CAPSULE ORAL 2 TIMES DAILY
Qty: 20 CAPSULE | Refills: 0 | Status: SHIPPED | OUTPATIENT
Start: 2021-09-26 | End: 2021-10-06

## 2021-09-26 RX ORDER — FERROUS SULFATE 325(65) MG
325 TABLET ORAL 2 TIMES DAILY WITH MEALS
Qty: 30 TABLET | Refills: 3 | Status: SHIPPED | OUTPATIENT
Start: 2021-09-26

## 2021-09-26 RX ORDER — SODIUM CHLORIDE, SODIUM LACTATE, POTASSIUM CHLORIDE, CALCIUM CHLORIDE 600; 310; 30; 20 MG/100ML; MG/100ML; MG/100ML; MG/100ML
INJECTION, SOLUTION INTRAVENOUS CONTINUOUS
Status: DISCONTINUED | OUTPATIENT
Start: 2021-09-26 | End: 2021-09-26

## 2021-09-26 RX ORDER — PROPOFOL 10 MG/ML
INJECTION, EMULSION INTRAVENOUS PRN
Status: DISCONTINUED | OUTPATIENT
Start: 2021-09-26 | End: 2021-09-26 | Stop reason: SDUPTHER

## 2021-09-26 RX ORDER — HYDROCODONE BITARTRATE AND ACETAMINOPHEN 5; 325 MG/1; MG/1
2 TABLET ORAL PRN
Status: DISCONTINUED | OUTPATIENT
Start: 2021-09-26 | End: 2021-09-26 | Stop reason: HOSPADM

## 2021-09-26 RX ORDER — FERROUS SULFATE 325(65) MG
325 TABLET ORAL 2 TIMES DAILY WITH MEALS
Status: DISCONTINUED | OUTPATIENT
Start: 2021-09-26 | End: 2021-09-26 | Stop reason: HOSPADM

## 2021-09-26 RX ORDER — DIPHENHYDRAMINE HYDROCHLORIDE 50 MG/ML
12.5 INJECTION INTRAMUSCULAR; INTRAVENOUS
Status: DISCONTINUED | OUTPATIENT
Start: 2021-09-26 | End: 2021-09-26 | Stop reason: HOSPADM

## 2021-09-26 RX ORDER — ONDANSETRON 2 MG/ML
4 INJECTION INTRAMUSCULAR; INTRAVENOUS
Status: DISCONTINUED | OUTPATIENT
Start: 2021-09-26 | End: 2021-09-26 | Stop reason: HOSPADM

## 2021-09-26 RX ADMIN — SODIUM CHLORIDE, POTASSIUM CHLORIDE, SODIUM LACTATE AND CALCIUM CHLORIDE: 600; 310; 30; 20 INJECTION, SOLUTION INTRAVENOUS at 07:56

## 2021-09-26 RX ADMIN — FERROUS SULFATE TAB 325 MG (65 MG ELEMENTAL FE) 325 MG: 325 (65 FE) TAB at 10:55

## 2021-09-26 RX ADMIN — PROPOFOL 50 MG: 10 INJECTION, EMULSION INTRAVENOUS at 09:09

## 2021-09-26 RX ADMIN — OXCARBAZEPINE 150 MG: 150 TABLET, FILM COATED ORAL at 10:56

## 2021-09-26 RX ADMIN — CITALOPRAM HYDROBROMIDE 10 MG: 10 TABLET ORAL at 10:56

## 2021-09-26 RX ADMIN — PROPOFOL 50 MG: 10 INJECTION, EMULSION INTRAVENOUS at 09:18

## 2021-09-26 RX ADMIN — NITROFURANTOIN MONOHYDRATE AND NITROFURANTOIN MACROCRYSTALLINE 100 MG: 75; 25 CAPSULE ORAL at 13:23

## 2021-09-26 RX ADMIN — PROPOFOL 50 MG: 10 INJECTION, EMULSION INTRAVENOUS at 09:03

## 2021-09-26 RX ADMIN — PROPOFOL 50 MG: 10 INJECTION, EMULSION INTRAVENOUS at 09:06

## 2021-09-26 RX ADMIN — ATORVASTATIN CALCIUM 10 MG: 10 TABLET, FILM COATED ORAL at 10:55

## 2021-09-26 RX ADMIN — FERROUS SULFATE TAB 325 MG (65 MG ELEMENTAL FE) 325 MG: 325 (65 FE) TAB at 17:11

## 2021-09-26 RX ADMIN — PROPOFOL 50 MG: 10 INJECTION, EMULSION INTRAVENOUS at 09:13

## 2021-09-26 RX ADMIN — FOLIC ACID 1 MG: 1 TABLET ORAL at 10:55

## 2021-09-26 RX ADMIN — PROPOFOL 50 MG: 10 INJECTION, EMULSION INTRAVENOUS at 09:01

## 2021-09-26 ASSESSMENT — ENCOUNTER SYMPTOMS
SHORTNESS OF BREATH: 1
STRIDOR: 0
CHEST TIGHTNESS: 0
COUGH: 0
WHEEZING: 0
EYES NEGATIVE: 1
GASTROINTESTINAL NEGATIVE: 1
NAUSEA: 0
BLOOD IN STOOL: 0

## 2021-09-26 ASSESSMENT — PULMONARY FUNCTION TESTS
PIF_VALUE: 1

## 2021-09-26 NOTE — OP NOTE
Endoscopy Note    Patient: Yoly Gonzáles  YOB: 1955  MRN: [id number]  Date of Procedure: 9/26/2021    Pre-Op Diagnosis: anemia, diarrhea, history of occasional rectal bleeding    Post-Op Diagnosis: Antral polyp, diverticulosis of the colon       Procedure(s):  EGD BIOPSY  COLONOSCOPY WITH BIOPSY    Anesthesia: Monitor Anesthesia Care    Surgeon(s):  Chapo Haywood MD    Staff:  Scrub Person First: Carlos Jane     Estimated Blood Loss: Minimal    Complications: None    Specimens:   ID Type Source Tests Collected by Time Destination   A : antral biopsy Tissue Stomach SURGICAL PATHOLOGY Chapo Haywood MD 9/26/2021 0908    B : Random biopsies Tissue Colon SURGICAL PATHOLOGY Chapo Haywood MD 9/26/2021 0920        Indications: This is a 78y.o. year old female who presents today with history of anemia and occasional diarrhea with rectal bleeding. Consent:  The patient or their legal guardian has signed a consent, and is aware of the potential risks, benefits, alternatives, and potential complications of this procedure. These include, but are not limited to hemorrhage, bleeding, post procedural pain, perforation, phlebitis, aspiration, hypotension, hypoxia, cardiovascular events such as arryhthmia, and possibly death. Additionally, the possibility of missed colonic polyps and interval colon cancer was discussed in the consent. Description of Procedure: Olympus videogastroscope was inserted into the esophagus and advanced up to the distal duodenum. Findings:    Esophagus: Normal esophageal mucosa, EG junction was at about 36 cm from the entry. .   Stomach: Fundus body antrum and pylorus was examined which showed a 2 to 3 mm sessile polyp in the antrum which was removed using a cold biopsy forcep  Duodenum: Duodenal bulb and post bulbar duodenum was examined and was normal.        A digital rectal examination was performed and revealed negative without mass, lesions or tenderness. Colonoscopy  Olympus video pediatric colonoscope was inserted into the rectum and advanced up to the cecum. Scope was advanced to the ileocecal valve into the terminal ileum. Mucosa the ileum was normal, cecum ascending colon and transverse colon is normal, ascending colon and sigmoid colon showed few scattered diverticuli, rectum is normal, patient has a small internal hemorrhoid. ,  Random biopsies were taken from the colon to rule out possible microscopic colitis    The patient tolerated the procedure well and was taken to the PACU in good condition.     Impression:    1) antral polyp, diverticulosis of the colon and internal hemorrhoid    Recommendations:   1) follow biopsy results    Nesha Doyle MD  [facility]

## 2021-09-26 NOTE — PROGRESS NOTES
Assessment complete. Patient is alert and oriented X4. Denies any SOB, N/V, dizziness or pain at this time. Tolerating the Golytely well. Denies any other needs at this time and call light is within reach.   Electronically signed by Socrates Gauthier RN on 9/25/2021 at 8:24 PM

## 2021-09-26 NOTE — ANESTHESIA POSTPROCEDURE EVALUATION
Department of Anesthesiology  Postprocedure Note    Patient: Mary Arreaga  MRN: [id number]  YOB: 1955  Date of evaluation: 9/26/2021  Time:  9:32 AM     Procedure Summary     Date: 09/26/21 Room / Location: [address]    Anesthesia Start: 7574 Anesthesia Stop:     Procedures:       EGD BIOPSY (N/A )      COLONOSCOPY WITH BIOPSY (N/A ) Diagnosis: (anemia)    Surgeons: Tonya Ramirez MD Responsible Provider: Trinidad Joy MD    Anesthesia Type: MAC ASA Status: 3 - Emergent          Anesthesia Type: No value filed. [place] Phase I:      [place] Phase II:      Last vitals: Reviewed and per EMR flowsheets.        Anesthesia Post Evaluation    Patient location during evaluation: PACU  Patient participation: complete - patient participated  Level of consciousness: awake  Pain score: 0  Airway patency: patent  Nausea & Vomiting: no nausea  Complications: no  Cardiovascular status: hemodynamically stable  Respiratory status: acceptable  Hydration status: euvolemic

## 2021-09-26 NOTE — PROGRESS NOTES
Pt now complains of urinary tract like symptoms. Will send UA and start patient on Macrobid based on previous urine culture sensitivity results. Spoke with nursing that pt can still be discharged.

## 2021-09-26 NOTE — PROGRESS NOTES
Patient stating that the prep is making her nauseous; zofran was given per order.   Electronically signed by Halle King RN on 9/25/2021 at 11:57 PM

## 2021-09-26 NOTE — PROGRESS NOTES
Progress Note  Patient: Sharifa Cartagena  Unit/Bed: MLOZ OR Pool/NONE  YOB: 1955  MRN: [id number]  Acct: [de-identified]   Admitting Diagnosis: Shortness of breath [R06.02]  Bradycardia [R00.1]  GI bleed [K92.2]  Lower GI bleed [K92.2]  Anemia, unspecified type [D64.9]  Chest pain, unspecified type [R07.9]  Admit Date:  9/25/2021  Hospital Day: 1    Chief Complaint: GIB    Histories:  Past Medical History:   Diagnosis Date    Allergic rhinitis     Bipolar 1 disorder ([facility] [address]     Depression     Hyperlipidemia     Hypertension     Irritable bowel syndrome     PTSD (post-traumatic stress disorder)      Past Surgical History:   Procedure Laterality Date    CHOLECYSTECTOMY      HYSTERECTOMY       Family History   Problem Relation Age of Onset    Cancer Father         Throat     Social History     Socioeconomic History    Marital status:      Spouse name: None    Number of children: None    Years of education: None    Highest education level: None   Occupational History    None   Tobacco Use    Smoking status: Current Every Day Smoker     Packs/day: 1.00    Smokeless tobacco: Never Used    Tobacco comment: 1-2 ppd   Vaping Use    Vaping Use: Never used   Substance and Sexual Activity    Alcohol use: Yes     Comment: socially    Drug use: No    Sexual activity: None   Other Topics Concern    None   Social History Narrative    None     Social Determinants of Health     Financial Resource Strain:     Difficulty of Paying Living Expenses:    Food Insecurity:     Worried About Running Out of Food in the Last Year:     Ran Out of Food in the Last Year:    Transportation Needs:     Lack of Transportation (Medical):      Lack of Transportation (Non-Medical):    Physical Activity:     Days of Exercise per Week:     Minutes of Exercise per Session:    Stress:     Feeling of Stress :    Social Connections:     Frequency of Communication with Friends and Family:     Frequency of Social Gatherings with Friends and Family:     Attends Protestant Services:     Active Member of Clubs or Organizations:     Attends Club or Organization Meetings:     Marital Status:    Intimate Partner Violence:     Fear of Current or Ex-Partner:     Emotionally Abused:     Physically Abused:     Sexually Abused:        Subjective/HPI  No acute events overnight. No PC no SOB. No active bleed. EKG: SR 70        Review of Systems:   Review of Systems   Constitutional: Negative. Negative for diaphoresis and fatigue. HENT: Negative. Eyes: Negative. Respiratory: Positive for shortness of breath. Negative for cough, chest tightness, wheezing and stridor. Cardiovascular: Negative. Negative for chest pain, palpitations and leg swelling. Gastrointestinal: Negative. Negative for blood in stool and nausea. Genitourinary: Negative. Musculoskeletal: Negative. Skin: Negative. Neurological: Positive for weakness. Negative for dizziness, syncope and light-headedness. Hematological: Negative. Psychiatric/Behavioral: Negative. Physical Examination:    BP (!) 149/99   Pulse 87   Temp 97.2 °F (36.2 °C) (Temporal)   Resp 12   Ht 5' 4\" (1.626 m)   Wt 230 lb (104.3 kg)   LMP  (LMP Unknown)   SpO2 92%   BMI 39.48 kg/m²    Physical Exam   Constitutional: She appears healthy. No distress. HENT:   Normal cephalic and Atraumatic   Eyes: Pupils are equal, round, and reactive to light. Neck: Thyroid normal. No JVD present. No neck adenopathy. No thyromegaly present. Cardiovascular: Normal rate, regular rhythm, intact distal pulses and normal pulses. Murmur heard. Pulmonary/Chest: Effort normal and breath sounds normal. She has no wheezes. She has no rales. She exhibits no tenderness. Abdominal: Soft. Bowel sounds are normal. There is no abdominal tenderness. Musculoskeletal:         General: No tenderness or edema. Normal range of motion.       Cervical back: Normal range of motion and neck supple. Neurological: She is alert and oriented to person, place, and time. Skin: Skin is warm. No cyanosis. Nails show no clubbing.        LABS:  CBC:   Lab Results   Component Value Date    WBC 7.6 09/25/2021    RBC 3.02 09/25/2021    RBC 4.60 06/11/2018    HGB 7.7 09/25/2021    HCT 23.6 09/25/2021    MCV 76.3 09/25/2021    MCH 24.8 09/25/2021    MCHC 32.5 09/25/2021    RDW 15.7 09/25/2021     09/25/2021    MPV 10.6 06/11/2018     CBC with Differential:    Lab Results   Component Value Date    WBC 7.6 09/25/2021    RBC 3.02 09/25/2021    RBC 4.60 06/11/2018    HGB 7.7 09/25/2021    HCT 23.6 09/25/2021     09/25/2021    MCV 76.3 09/25/2021    MCH 24.8 09/25/2021    MCHC 32.5 09/25/2021    RDW 15.7 09/25/2021    NRBC 0.0 06/11/2018    LYMPHOPCT 37.5 09/25/2021    MONOPCT 6.8 09/25/2021    BASOPCT 0.5 09/25/2021    MONOSABS 0.5 09/25/2021    LYMPHSABS 2.8 09/25/2021    EOSABS 0.1 09/25/2021    BASOSABS 0.0 09/25/2021     CMP:    Lab Results   Component Value Date     09/25/2021    K 4.0 09/25/2021    K 4.1 03/03/2019    CL 95 09/25/2021    CO2 25 09/25/2021    BUN 5 09/25/2021    CREATININE 0.49 09/25/2021    GFRAA >60.0 09/25/2021    AGRATIO 1.4 06/09/2018    LABGLOM >60.0 09/25/2021    GLUCOSE 111 09/25/2021    GLUCOSE 147 06/11/2018    PROT 6.0 09/25/2021    LABALBU 3.6 09/25/2021    LABALBU 3.8 06/09/2018    CALCIUM 8.8 09/25/2021    BILITOT <0.2 09/25/2021    ALKPHOS 89 09/25/2021    AST 33 09/25/2021    ALT 19 09/25/2021     BMP:    Lab Results   Component Value Date     09/25/2021    K 4.0 09/25/2021    K 4.1 03/03/2019    CL 95 09/25/2021    CO2 25 09/25/2021    BUN 5 09/25/2021    LABALBU 3.6 09/25/2021    LABALBU 3.8 06/09/2018    CREATININE 0.49 09/25/2021    CALCIUM 8.8 09/25/2021    GFRAA >60.0 09/25/2021    LABGLOM >60.0 09/25/2021    GLUCOSE 111 09/25/2021    GLUCOSE 147 06/11/2018     Magnesium:    Lab Results   Component Value Date    MG 1.7 03/01/2021 Troponin:    Lab Results   Component Value Date    TROPONINI <0.010 09/25/2021        Active Hospital Problems    Diagnosis Date Noted    GI bleed [K92.2] 09/25/2021     Priority: Low    Chest pain [R07.9]      Priority: Low        Assessment/Plan:  1. GIB/ANemia- she can proceed with GI w/u- discussed with Dr. Kush Jovel - scheduled for today  2. CP- atyical noncardiac  3. LVEF 60%  4. Moderate AS- will continue to follow this as out pt.   5. HTN Stable on meds.         Electronically signed by Dali Burgos MD on 9/26/2021 at 9:57 AM

## 2021-09-26 NOTE — CARE COORDINATION
1015- Per Dr. Samira Angelo, patient's scope is complete and negative for active bleeding. Dr. Samira Angelo states that patient has iron-deficiency anemia and will be started on iron supplements. Both hospitalist and GI physician have approved patient for discharge, with probable return to [facility]. CM met with patient and her mother and sister (who she lives with) with Dr. Samira Angelo and patient's RN also present. Patient was unable to focus on conversation, fixated instead on coffee and cigarettes. Patient's family explains that patient was at [facility] for medication adjustments. Patient reports it was because she threatened the president. Patient is a client of the [facility]. Sister expresses concerns that they will be able to manage patient if she were to go home and believes patient should return to [facility]. This CM attempted to explain to patient that she would need the supervision of mental health staff while completing med changes. Patient became very angry, yelling, swearing, and slamming things on her bedside table. Behaviors and anger escalated significanty and patient continued to perseverate on coffee and cigarettes. Family became fearful and asked if a  could be called. CM was able to calm patient and explained to patient and family that CM would contact [facility] to determine the process for patient to return to complete medication adjustments. 1030- Call placed to [facility], spoke with Betzy Sears in intake. CM was placed on hold for 15+ minutes while intake checked to see if patient could return to facility. Rebeca with [facility] intake returned to phone and stated that Rosalba Pod is a lack of communication on our part [facility]) and I had to contact the director to see if we can take the patient back. I should have an answer for you ASAP. \" CM provided contact info and updated patient/family, RN, and provider.     1200- This CM called [facility] intake regarding whether patient will be accepted for return. Per Esha Frias in intake and Sara Ann, RN, patient is good to return to room 108-2. Patient/family updated. 1220- Transport arranged through [facility] for pick-up is 1845. Patient/family, RN, and hub updated.

## 2021-09-26 NOTE — PROGRESS NOTES
Spiritual Care Services     Summary of Visit:   visited patient at patient's request. Patient's mother and sister were also present. Patient specifically asked for prayer and she said she needed patience.  provided prayer for patient and her her family and provided pastoral support. Spiritual Assessment/Intervention/Outcomes:    Encounter Summary  Services provided to[de-identified] Patient and family together  Referral/Consult From[de-identified] Patient  Support System: Parent, Family members  Continue Visiting: No  Complexity of Encounter: Moderate  Length of Encounter: 15 minutes  Spiritual Assessment Completed: Yes  Routine  Type: Initial     Spiritual/Yazidism  Type: Spiritual support  Assessment: Calm, Approachable, Anxious  Intervention: Nurtured hope, Prayer, Sustaining presence/ Ministry of presence  Outcome: Expressed gratitude, Encouraged                    Values / Beliefs  Do you have any ethnic, cultural, sacramental, or spiritual Buddhism needs you would like us to be aware of while you are in the hospital?: No    Care Plan:        [id number]   Electronically signed by Amira Archibald on 9/26/21 at 10:58 AM EDT     To reach a  for emotional and spiritual support, place an [facility] consult request.   If a  is needed immediately, dial 0 and ask to page the on-call .

## 2021-09-26 NOTE — DISCHARGE INSTR - COC
Continuity of Care Form    Patient Name: Dalton Noel   :    MRN:  [id number]    Admit date:  2021  Discharge date: 2021    Code Status Order: Full Code   Advance Directives:   Advance Care Flowsheet Documentation       Date/Time Healthcare Directive Type of Healthcare Directive Copy in [address] Agent's Name Healthcare Agent's Phone Number    [phone]  No, patient does not have an advance directive for healthcare treatment  --  --  --  --  --            Admitting Physician:   Raymon Bauer MD  PCP: Mamie Angelo    Discharging Nurse: [facility] Unit/Room#: T708/O852-02  Discharging Unit Phone Number: [phone]    Emergency Contact:   Extended Emergency Contact Information  Primary Emergency Contact: Bette [address] Phone: [phone]  Mobile Phone: [phone]  Relation: Parent    Past Surgical History:  Past Surgical History:   Procedure Laterality Date    CHOLECYSTECTOMY      HYSTERECTOMY         Immunization History:   Immunization History   Administered Date(s) Administered    COVID-19, Pfizer, PF, 30mcg/0.3mL 2021, 2021       Active Problems:  Patient Active Problem List   Diagnosis Code    Other schizoaffective disorders ([facility] [address] F25.8    Bipolar 1 disorder ([facility] [address] F31.9    GI bleed K92.2    Chest pain R07.9       Isolation/Infection:   Isolation            C Diff Contact          Patient Infection Status       Infection Onset Added Last Indicated Last Indicated By Review Planned Expiration Resolved Resolved By    C-diff Rule Out 21 Clostridium difficile toxin/antigen (Ordered)                Nurse Assessment:  Last Vital Signs: BP (!) 149/69   Pulse 93   Temp 97.2 °F (36.2 °C) (Temporal)   Resp 16   Ht 5' 4\" (1.626 m)   Wt 230 lb (104.3 kg)   LMP  (LMP Unknown)   SpO2 96%   BMI 39.48 kg/m²     Last documented pain score (0-10 scale):    Last Weight: Address:  Phone:  Fax:    Dialysis Facility (if applicable)   Name:  Address:  Dialysis Schedule:  Phone:  Fax:    / signature: {Esignature:[id number]}    PHYSICIAN SECTION    Prognosis: fair    Condition at Discharge: stable    Rehab Potential (if transferring to Rehab): {Prognosis:4323327715}    Recommended Labs or Other Treatments After Discharge: CBC in 3 days  FU urine cx sensitivity    Physician Certification: I certify the above information and transfer of Matty Lindsey  is necessary for the continuing treatment of the diagnosis listed and that she requires {Admit to Appropriate Level of Care:12037} for {GREATER/LESS:842065677} 30 days.      Update Admission H&P: {CHP DME Changes in GDPOR:515195329}    PHYSICIAN SIGNATURE:  Electronically signed by Kamala Mckay MD on 9/26/21 at 2:42 PM EDT

## 2021-09-26 NOTE — ANESTHESIA PRE PROCEDURE
Department of Anesthesiology  Preprocedure Note       Name:  Felipe Rios   Age:  78 y.o.  :  1955                                          MRN:  [id number]         Date:  2021      Surgeon: Bindu Mcdaniel):  Emili Pardo MD    Procedure: Procedure(s):  EGD BIOPSY  COLONOSCOPY WITH BIOPSY    Medications prior to admission:   Prior to Admission medications    Medication Sig Start Date End Date Taking?  Authorizing Provider   insulin aspart (NOVOLOG) 100 UNIT/ML injection vial Inject into the skin 4 times daily (before meals and nightly) 151-200=2 units 201-250=4 units 251-300=6 units 301-350=8 units 351-400= 10 units >401 recheck and notify provider   Yes Historical Provider, MD   naloxone (NARCAN) 0.4 MG/ML injection Infuse 0.4 mg intravenously as needed   Yes Historical Provider, MD   Melatonin 3-10 MG TABS Take 3 mg by mouth nightly as needed (for insomnia)   Yes Historical Provider, MD   Glucagon, rDNA, (GLUCAGON EMERGENCY) 1 MG KIT Inject 1 mg as directed as needed (low blood sugar)   Yes Historical Provider, MD   acetaminophen (TYLENOL) 325 MG tablet Take 650 mg by mouth every 6 hours as needed for Pain or Fever   Yes Historical Provider, MD   prazosin (MINIPRESS) 1 MG capsule Take 1 mg by mouth nightly   Yes Historical Provider, MD   vitamin D (ERGOCALCIFEROL) 1.25 MG (93729 UT) CAPS capsule Take 50,000 Units by mouth once a week Every wednesday   Yes Historical Provider, MD   atorvastatin (LIPITOR) 10 MG tablet Take 10 mg by mouth daily   Yes Historical Provider, MD   folic acid (FOLVITE) 1 MG tablet Take 1 mg by mouth daily   Yes Historical Provider, MD   metFORMIN (GLUCOPHAGE) 500 MG tablet Take 500 mg by mouth 3 times daily (with meals)   Yes Historical Provider, MD   montelukast (SINGULAIR) 10 MG tablet Take 10 mg by mouth nightly   Yes Historical Provider, MD   nicotine (NICODERM CQ) 21 MG/24HR Place 1 patch onto the skin every 24 hours   Yes Historical Provider, MD   OXcarbazepine (TRILEPTAL) 150 MG tablet Take 150 mg by mouth daily (with breakfast)   Yes Historical Provider, MD   loperamide (IMODIUM) 2 MG capsule Take 2 mg by mouth 4 times daily as needed for Diarrhea   Yes Historical Provider, MD   albuterol sulfate HFA (VENTOLIN HFA) 108 (90 Base) MCG/ACT inhaler Inhale 2 puffs into the lungs 4 times daily as needed for Wheezing 3/1/21  Yes Alice Francis MD   OXcarbazepine (TRILEPTAL) 300 MG tablet Take 1 tablet by mouth 2 times daily 1/21/20  Yes Brianda Senior MD   citalopram (CELEXA) 10 MG tablet Take 1 tablet by mouth daily 1/21/20  Yes Brianda Senior MD   ARIPiprazole (ABILIFY) 15 MG tablet Take 1 tablet by mouth nightly 1/21/20  Yes Brianda Senior MD   metoprolol tartrate (LOPRESSOR) 25 MG tablet Take 1 tablet by mouth daily 1/21/20  Yes Brianda Senior MD   ARIPiprazole ER (ABILIFY MAINTENA) 400 MG PRSY Inject 300 mg into the muscle every 30 days 2/18/20   Brianda Senior MD   pravastatin (PRAVACHOL) 20 MG tablet Take 1 tablet by mouth nightly 1/21/20   Brianda Senior MD   fluticasone (FLONASE) 50 MCG/ACT nasal spray 1 spray by Each Nostril route daily Indications: Per [facility] medication list 10/5/2019    Historical Provider, MD       Current medications:    Current Facility-Administered Medications   Medication Dose Route Frequency Provider Last Rate Last Admin    HYDROmorphone (DILAUDID) injection 0.5 mg  0.5 mg IntraVENous Q10 Min PRN Claudene Mc, MD        HYDROcodone-acetaminophen [facility]) 5-325 MG per tablet 1 tablet  1 tablet Oral PRN Claudene Mc, MD        Or    HYDROcodone-acetaminophen [facility]) 5-325 MG per tablet 2 tablet  2 tablet Oral PRN Claudene Mc, MD        ondansetron [facility]) injection 4 mg  4 mg IntraVENous Once PRN Claudene Mc, MD        diphenhydrAMINE (BENADRYL) injection 12.5 mg  12.5 mg IntraVENous Once PRN Claudene Mc, MD        ipratropium-albuterol (DUONEB) nebulizer solution 1 ampule  1 ampule Inhalation Continuous PRN Severo Potters, PA   1 ampule at 09/25/21 0407    sodium chloride flush 0.9 % injection 5-40 mL  5-40 mL IntraVENous 2 times per day Gerardo Ramsay MD        sodium chloride flush 0.9 % injection 5-40 mL  5-40 mL IntraVENous PRN Gerardo Ramsay MD        0.9 % sodium chloride infusion  25 mL IntraVENous PRN Gerardo Ramsay MD        ondansetron (ZOFRAN-ODT) disintegrating tablet 4 mg  4 mg Oral Q8H PRN Gerardo Ramsay MD        Or    [facility] injection 4 mg  4 mg IntraVENous Q6H PRN Gerardo Ramsay MD   4 mg at 09/25/21 2348    polyethylene glycol (GLYCOLAX) packet 17 g  17 g Oral Daily PRN Gerardo Ramsay MD        acetaminophen (TYLENOL) tablet 650 mg  650 mg Oral Q6H PRN Gerardo Ramsay MD        Or    acetaminophen (TYLENOL) suppository 650 mg  650 mg Rectal Q6H PRN Gerardo Ramsay MD        pantoprazole (PROTONIX) injection 40 mg  40 mg IntraVENous BID Gerardo Ramsay MD   40 mg at 09/25/21 2022    And    sodium chloride (PF) 0.9 % injection 10 mL  10 mL IntraVENous BID Gerardo Ramsay MD   10 mL at 09/25/21 2023    OXcarbazepine (TRILEPTAL) tablet 150 mg  150 mg Oral Daily with breakfast Ney Matos MD        prazosin (MINIPRESS) capsule 1 mg  1 mg Oral Nightly Ney Matos MD   1 mg at 09/25/21 2023    vitamin D (ERGOCALCIFEROL) capsule 50,000 Units  50,000 Units Oral Weekly Ney Matos MD        montelukast (SINGULAIR) tablet 10 mg  10 mg Oral Nightly Ney Matos MD   10 mg at 09/25/21 2023    albuterol sulfate  (90 Base) MCG/ACT inhaler 2 puff  2 puff Inhalation 4x Daily PRN Ney Matos MD        atorvastatin (LIPITOR) tablet 10 mg  10 mg Oral Daily Ney Matos MD   10 mg at 09/25/21 1049    citalopram (CELEXA) tablet 10 mg  10 mg Oral Daily Ney Matos MD   10 mg at 86/15/03 7230    folic acid (FOLVITE) tablet 1 mg  1 mg Oral Daily Ney Matos MD   1 mg at 09/25/21 1049    nicotine (NICODERM CQ) 21 MG/24HR 1 patch  1 patch TransDERmal Q24H Herbert Morales MD   1 patch at 09/25/21 1049    melatonin disintegrating tablet 5 mg  5 mg Oral Nightly PRN Herbert Morales MD        0.9 % sodium chloride infusion   IntraVENous Continuous Herbert Morales MD 75 mL/hr at 09/25/21 1049 New Bag at 09/25/21 1049    OXcarbazepine (TRILEPTAL) tablet 600 mg  600 mg Oral Nightly Herbert Morales MD   600 mg at 09/25/21 2023    ARIPiprazole (ABILIFY) tablet 20 mg  20 mg Oral Nightly Herbert Morales MD   20 mg at 09/25/21 2024    sodium chloride flush 0.9 % injection 5-40 mL  5-40 mL IntraVENous 2 times per day Irena Koroma MD        sodium chloride flush 0.9 % injection 5-40 mL  5-40 mL IntraVENous PRN Irena Koroma MD        0.9 % sodium chloride infusion  25 mL IntraVENous PRN Irena Koroma MD        sodium chloride flush 0.9 % injection 5-40 mL  5-40 mL IntraVENous 2 times per day Irena Koroma MD        sodium chloride flush 0.9 % injection 5-40 mL  5-40 mL IntraVENous PRN Irena Koroma MD   10 mL at 09/25/21 2024    0.9 % sodium chloride infusion  25 mL IntraVENous PRN Irena Koroma MD        polyethylene glycol (GoLYTELY) solution 4,000 mL  4,000 mL Oral Once Irena Koroma MD           Allergies:  No Known Allergies    Problem List:    Patient Active Problem List   Diagnosis Code    [facility] F25.8    Bipolar 1 disorder [facility] F31.9    GI bleed K92.2    Chest pain R07.9       Past Medical History:        Diagnosis Date    Allergic rhinitis     [facility]     Depression     Hyperlipidemia     Hypertension     Irritable bowel syndrome     PTSD (post-traumatic stress disorder)        Past Surgical History:        Procedure Laterality Date    CHOLECYSTECTOMY      HYSTERECTOMY         Social History:    Social History     Tobacco Use    Smoking status: Current Every Day Smoker     Packs/day: 1.00    Smokeless tobacco: Never Used    Tobacco comment: 1-2 ppd   Substance Use Topics    Alcohol use: Yes     Comment: socially                                Ready to quit: Not Answered  Counseling given: Not Answered  Comment: 1-2 ppd      Vital Signs (Current):   Vitals:    09/25/21 0600 09/25/21 0645 09/25/21 0744 09/25/21 1956   BP: (!) 145/54 (!) 172/54 123/85 (!) 184/62   Pulse: 61 61 63 64   Resp: 18 16 18 24   Temp:   97.9 °F (36.6 °C) 97.1 °F (36.2 °C)   TempSrc:   Oral Oral   SpO2: 97% 95% 98% 100%   Weight:       Height:                                                  BP Readings from Last 3 Encounters:   09/25/21 (!) 184/62   03/01/21 (!) 151/79   03/03/19 (!) 155/68       NPO Status:                                                                                 BMI:   Wt Readings from Last 3 Encounters:   09/25/21 230 lb (104.3 kg)   03/01/21 230 lb (104.3 kg)   02/27/19 230 lb (104.3 kg)     Body mass index is 39.48 kg/m².     CBC:   Lab Results   Component Value Date    WBC 7.6 09/25/2021    RBC 3.02 09/25/2021    RBC 4.60 06/11/2018    HGB 7.7 09/25/2021    HCT 23.6 09/25/2021    MCV 76.3 09/25/2021    RDW 15.7 09/25/2021     09/25/2021       CMP:   Lab Results   Component Value Date     09/25/2021    K 4.0 09/25/2021    K 4.1 03/03/2019    CL 95 09/25/2021    CO2 25 09/25/2021    BUN 5 09/25/2021    CREATININE 0.49 09/25/2021    GFRAA >60.0 09/25/2021    AGRATIO 1.4 06/09/2018    LABGLOM >60.0 09/25/2021    GLUCOSE 111 09/25/2021    GLUCOSE 147 06/11/2018    PROT 6.0 09/25/2021    CALCIUM 8.8 09/25/2021    BILITOT <0.2 09/25/2021    ALKPHOS 89 09/25/2021    AST 33 09/25/2021    ALT 19 09/25/2021       POC Tests:   Recent Labs     09/25/21  1615   POCGLU 112       Coags: No results found for: PROTIME, INR, APTT    HCG (If Applicable):   Lab Results   Component Value Date    PREGTESTUR Negative 01/11/2020        ABGs: No results found for: PHART, PO2ART, ONF5NSN, WFP7KEJ, BEART, J3RQQEBK     Type & Screen (If Applicable):  No results found for: LABABO, LABRH    Drug/Infectious Status (If Applicable):  No results found for: HIV, HEPCAB    COVID-19 Screening (If Applicable): No results found for: COVID19        Anesthesia Evaluation  Patient summary reviewed and Nursing notes reviewed no history of anesthetic complications:   Airway: Mallampati: II  TM distance: >3 FB   Neck ROM: full  Mouth opening: > = 3 FB Dental: normal exam         Pulmonary:Negative Pulmonary ROS and normal exam                               Cardiovascular:    (+) hypertension:,       ECG reviewed                        Neuro/Psych:   (+) psychiatric history:            GI/Hepatic/Renal:   (+) morbid obesity          Endo/Other: Negative Endo/Other ROS                    Abdominal:   (+) obese,           Vascular: Other Findings:             Anesthesia Plan      MAC     ASA 3 - emergent             Anesthetic plan and risks discussed with patient.                       Yuri Howard MD   9/26/2021

## 2021-09-26 NOTE — DISCHARGE SUMMARY
Discharge Summary    Date: 9/26/2021  Patient Name: Gerard Alejo YOB: 1955 Age: 78 y.o. Admit Date: 9/25/2021  Discharge Date: 9/26/2021  Discharge Condition:    Admission Diagnosis  Shortness of breath (R06.02); Bradycardia (R00.1);GI bleed (K92.2); Lower GI bleed (K92.2); Anemia, unspecified type (D64.9); Chest pain, unspecified type (R07.9)     Discharge Diagnosis  Active Problems: GI bleed Chest painResolved Problems: * No resolved hospital problems. [facility] Stay  Narrative of Hospital Course:  Patient called from [address] [place] [address] for anemia and chest pain. Was eval by cardiology troponins negative atypical chest pain. EGD and colonoscopy did not show any acute bleed. Patient found to have iron deficiency anemia. On colonoscopy patient found to have hemorrhoids not active bleeding and diverticulosis of large intestine. Status post biopsy. Recommend high-fiber diet. , pt will be discharge on po iron tablets and celliac studies as per GI, FU with PCP for iron/folate def anemia. No need for PPI, Please FU urine cx results for sensitivity. Consultants:  IP CONSULT TO GIIP CONSULT TO CARDIOLOGY    Surgeries/procedures Performed:       Treatments:           Discharge Plan/Disposition:  Home    Hospital/Incidental Findings Requiring Follow Up:    Patient Instructions:    Diet:    Activity:  For number of days (if applicable): Other Instructions:    Provider Follow-Up:   No follow-ups on file.      Significant Diagnostic Studies:    Recent Labs:  Admission on 09/25/2021Sodium                                        Date: 09/25/2021Value: 131*        Ref range: 135 - 144 mEq/L    Status: FinalPotassium                                     Date: 09/25/2021Value: 4.0         Ref range: 3.4 - 4.9 mEq/L    Status: FinalChloride                                      Date: 09/25/2021Value: 95          Ref range: 95 - 107 mEq/L     Status: FinalCO2                                           Date: Ref range: 0 - 35 U/L         Status: FinalGlobulin                                      Date: 09/25/2021Value: 2.4         Ref range: 2.3 - 3.5 g/dL     Status: [facility]                                           Date: 09/25/2021Value: 7.6         Ref range: 4.8 - 10.8 K/uL    Status: FinalRBC                                           Date: 09/25/2021Value: 3.02*       Ref range: 4.20 - 5.40 M/uL   Status: FinalHemoglobin                                    Date: 09/25/2021Value: 7.5*        Ref range: 12.0 - 16.0 g/dL   Status: FinalHematocrit                                    Date: 09/25/2021Value: 23.1*       Ref range: 37.0 - 47.0 %      Status: FinalMCV                                           Date: 09/25/2021Value: 76.3*       Ref range: 82.0 - 100.0 fL    Status: [facility]                                           Date: 09/25/2021Value: 24.8*       Ref range: 27.0 - 31.3 pg     Status: [facility]                                          Date: 09/25/2021Value: 32.5*       Ref range: 33.0 - 37.0 %      Status: FinalRDW                                           Date: 09/25/2021Value: 15.7*       Ref range: 11.5 - 14.5 %      Status: FinalPlatelets                                     Date: 09/25/2021Value: 214         Ref range: 130 - 400 K/uL     Status: FinalNeutrophils %                                 Date: 09/25/2021Value: 53.8        Ref range: %                  Status: FinalLymphocytes %                                 Date: 09/25/2021Value: 37.5        Ref range: %                  Status: FinalMonocytes %                                   Date: 09/25/2021Value: 6.8         Ref range: %                  Status: FinalEosinophils %                                 Date: 09/25/2021Value: 1.4         Ref range: %                  Status: FinalBasophils %                                   Date: 09/25/2021Value: 0.5         Ref range: %                  Status: FinalNeutrophils Absolute Date: 09/25/2021Value: 4.1         Ref range: 1.4 - 6.5 K/uL     Status: FinalLymphocytes Absolute                          Date: 09/25/2021Value: 2.8         Ref range: 1.0 - 4.8 K/uL     Status: FinalMonocytes Absolute                            Date: 09/25/2021Value: 0.5         Ref range: 0.2 - 0.8 K/uL     Status: FinalEosinophils Absolute                          Date: 09/25/2021Value: 0.1         Ref range: 0.0 - 0.7 K/uL     Status: FinalBasophils Absolute                            Date: 09/25/2021Value: 0.0         Ref range: 0.0 - 0.2 K/uL     Status: FinalTroponin                                      Date: 09/25/2021Value: <0.010      Ref range: 0.000 - 0.010 ng*  Status: Final              Comment: Methodology by Troponin T.Pro-BNP                                       Date: 09/25/2021Value: 280         Ref range: pg/mL              Status: Final              Comment: NT-pro BNP ACUTE Interpretive Guidelines:      Age        Cutoff for Heart Failure   Less than 50 yrs   450 pg/mL   50-75 yrs           900 pg/mL   Greater than 75 yrs  1800 pg/mLNT-pro BNP NON-ACUTE Interpretive Guidelines:    Age                 Reference Range  Less than 74 yrs   0-125 pg/mL  Greater than 74 yrs   0-450 pg/mLOther possible causes of an elevated NT-proBNP include:cardiac ischemia, acute coronary syndrome, COPD, pneumonia,atrial fibrillation, pulmonary emboli, pulmonary hypertension,pericarditisReference:AROLDO Randall et al. NT-proBNP testing for diagnosis andshort-term prognosis in acute destabilized HF: an internationalpooled analysis of 1256 patients.   Heart Journal.2006;27:330-337Iron                                          Date: 09/25/2021Value: 28*         Ref range: 37 - 145 ug/dL     Status: FinalTIBC                                          Date: 09/25/2021Value: 365         Ref range: 178 - 450 ug/dL    Status: FinalIron Saturation                               Date: 09/25/2021Value: 8* Ref range: 11 - 46 %          Status: FinalTroponin                                      Date: 09/25/2021Value: <0.010      Ref range: 0.000 - 0.010 ng*  Status: Final              Comment: Methodology by Troponin T. Troponin                                      Date: 09/25/2021Value: <0.010      Ref range: 0.000 - 0.010 ng*  Status: Final              Comment: Methodology by Troponin T. Hemoglobin                                    Date: 09/25/2021Value: 7.8*        Ref range: 12.0 - 16.0 g/dL   Status: FinalHematocrit                                    Date: 09/25/2021Value: 23.8*       Ref range: 37.0 - 47.0 %      Status: FinalHemoglobin                                    Date: 09/25/2021Value: 7.7*        Ref range: 12.0 - 16.0 g/dL   Status: FinalHematocrit                                    Date: 09/25/2021Value: 23.6*       Ref range: 37.0 - 47.0 %      Status: FinalPOC Glucose                                   Date: 09/25/2021Value: 119*        Ref range: 60 - 115 mg/dl     Status: FinalPerformed on                                  Date: 09/25/2021Value: ACCU-CHEK     Status: [address] Glucose                                   Date: 09/25/2021Value: 112         Ref range: 60 - 115 mg/dl     Status: FinalPerformed on                                  Date: 09/25/2021Value: ACCU-CHEK     Status: FinalVentricular Rate                              Date: 09/26/2021Value: 80          Ref range: BPM                Status: PreliminaryAtrial Rate                                   Date: 09/26/2021Value: 80          Ref range: BPM                Status: PreliminaryP-R Interval                                  Date: 09/26/2021Value: 166         Ref range: ms                 Status: PreliminaryQRS Duration                                  Date: 09/26/2021Value: 90          Ref range: ms                 Status: PreliminaryQ-T Interval                                  Date: 09/26/2021Value: 392         Ref range: ms Status: PreliminaryQTc Calculation (Raul)                      Date: 09/26/2021Value: 452         Ref range: ms                 Status: PreliminaryP Axis                                        Date: 09/26/2021Value: 53          Ref range: degrees            Status: PreliminaryR Axis                                        Date: 09/26/2021Value: 10          Ref range: degrees            Status: PreliminaryT Axis                                        Date: 09/26/2021Value: 62          Ref range: degrees            Status: Preliminary------------    Radiology last 7 days:  XR CHEST PORTABLEResult Date: 9/25/2021No acute radiographic abnormality or significant interval change.       Pending Labs   Order Current Status  Culture, Urine Collected (09/26/21 1313)  Urinalysis Collected (09/26/21 1313)      Discharge Medications    Current Discharge Medication ListSTART taking these medicationsferrous sulfate (IRON 325) 325 (65 Fe) MG tabletTake 1 tablet by mouth 2 times daily (with meals)Qty: 30 tablet Refills: 3nitrofurantoin, macrocrystal-monohydrate, (MACROBID) 100 MG capsuleTake 1 capsule by mouth 2 times daily for 10 daysQty: 20 capsule Refills: 0    Current Discharge Medication List    Current Discharge Medication ListCONTINUE these medications which have NOT CHANGEDinsulin aspart (NOVOLOG) 100 UNIT/ML injection vialInject into the skin 4 times daily (before meals and nightly) 151-200=2 units 201-250=4 units 251-300=6 units 301-350=8 units 351-400= 10 units >401 recheck and notify providernaloxone (NARCAN) 0.4 MG/ML injectionInfuse 0.4 mg intravenously as neededMelatonin 3-10 MG TABSTake 3 mg by mouth nightly as needed (for insomnia)Glucagon, rDNA, (GLUCAGON EMERGENCY) 1 MG KITInject 1 mg as directed as needed (low blood sugar)acetaminophen (TYLENOL) 325 MG tabletTake 650 mg by mouth every 6 hours as needed for Pain or Feverprazosin (MINIPRESS) 1 MG capsuleTake 1 mg by mouth nightlyvitamin D (ERGOCALCIFEROL) 1.25 MG (39869 UT) CAPS capsuleTake 50,000 Units by mouth once a week Every wednesdayatorvastatin (LIPITOR) 10 MG tabletTake 10 mg by mouth dailyfolic acid (FOLVITE) 1 MG tabletTake 1 mg by mouth dailymetFORMIN (GLUCOPHAGE) 500 MG tabletTake 500 mg by mouth 3 times daily (with meals)montelukast (SINGULAIR) 10 MG tabletTake 10 mg by mouth nightlynicotine (NICODERM CQ) 21 MG/24HRPlace 1 patch onto the skin every 24 hours!! OXcarbazepine (TRILEPTAL) 150 MG tabletTake 150 mg by mouth daily (with breakfast)loperamide (IMODIUM) 2 MG capsuleTake 2 mg by mouth 4 times daily as needed for Diarrheaalbuterol sulfate HFA (VENTOLIN HFA) 108 (90 Base) MCG/ACT inhalerInhale 2 puffs into the lungs 4 times daily as needed for HEART OF THE [facility]: 1 Inhaler Refills: 0!! OXcarbazepine (TRILEPTAL) 300 MG tabletTake 1 tablet by mouth 2 times dailyQty: 60 tablet Refills: 1citalopram (CELEXA) 10 MG tabletTake 1 tablet by mouth dailyQty: 30 tablet Refills: 1ARIPiprazole (ABILIFY) 15 MG tabletTake 1 tablet by mouth nightlyQty: 30 tablet Refills: 0metoprolol tartrate (LOPRESSOR) 25 MG tabletTake 1 tablet by mouth dailyQty: 30 tablet Refills: 1ARIPiprazole ER (ABILIFY MAINTENA) 400 MG PRSYInject 300 mg into the muscle every 30 daysQty: 1 each Refills: 1fluticasone (FLONASE) 50 MCG/ACT nasal spray1 spray by Each Nostril route daily Indications: Per [facility] medication list 10/5/2019!! - Potential duplicate medications found. Please discuss with provider. Current Discharge Medication ListSTOP taking these medicationspravastatin (PRAVACHOL) 20 MG tabletComments:Reason for Stopping:    Time Spent on Discharge:E] minutes were spent in patient examination, evaluation, counseling as well as medication reconciliation, prescriptions for required medications, discharge plan, and follow up.     Electronically signed by Meli Brasher MD on 9/26/21 at 1:44 PM EDT   overtime on dc summary was 45 min

## 2021-09-27 LAB
EKG ATRIAL RATE: 80 BPM
EKG P AXIS: 53 DEGREES
EKG P-R INTERVAL: 166 MS
EKG Q-T INTERVAL: 392 MS
EKG QRS DURATION: 90 MS
EKG QTC CALCULATION (BAZETT): 452 MS
EKG R AXIS: 10 DEGREES
EKG T AXIS: 57 DEGREES
EKG VENTRICULAR RATE: 80 BPM

## 2021-09-27 PROCEDURE — 93010 ELECTROCARDIOGRAM REPORT: CPT | Performed by: INTERNAL MEDICINE

## 2021-09-28 LAB
EKG ATRIAL RATE: 56 BPM
EKG P AXIS: 16 DEGREES
EKG P-R INTERVAL: 180 MS
EKG Q-T INTERVAL: 442 MS
EKG QRS DURATION: 102 MS
EKG QTC CALCULATION (BAZETT): 426 MS
EKG R AXIS: 12 DEGREES
EKG T AXIS: 53 DEGREES
EKG VENTRICULAR RATE: 56 BPM

## 2021-09-29 LAB
ORGANISM: ABNORMAL
URINE CULTURE, ROUTINE: ABNORMAL

## 2021-10-07 LAB
DATE OF SYMPTOM ONSET: NORMAL
PROCALCITONIN: 0.13 NG/ML
SARS-COV-2, PCR: NOT DETECTED
SPECIMEN SOURCE: NORMAL

## 2021-10-08 NOTE — PROGRESS NOTES
colon and internal   hemorrhoid  Clinical Indicators: per discharge summary: EGD and colonoscopy did not show   any acute bleed. . On colonoscopy patient found to have hemorrhoids not active   bleeding and diverticulosis of large intestine. h/h 7.5/23.1   FOBT +  Treatment: GI cardiologist consult  monitor H/H transfuse if HGB <7  EGD   colonoscopy  discharged on PO iron tablets    Thank you,  Jay ERNANDEZN RN CDS  contact Brandee Quinones  or  [email or web address]  Options provided:  -- iron deficiency anemia confirmed and acute post hemorrhagic anemia ruled   out  -- acute post hemorrhagic anemia confirmed and iron deficiency anemia ruled   out  -- Other - I will add my own diagnosis  -- Disagree - Not applicable / Not valid  -- Disagree - Clinically unable to determine / Unknown  -- Refer to Clinical Documentation Reviewer    PROVIDER RESPONSE TEXT:    Provider is clinically unable to determine a response to this query.     Query created by: Caitlin Haney on 10/7/2021 2:52 PM      Electronically signed by:  Loc Park MD 10/8/2021 2:50 PM

## 2021-10-18 NOTE — PROGRESS NOTES
Physician Progress Note      PATIENT:               Olivia Barbosa  CSN #:                  [id number]  :                       1955  ADMIT DATE:       2021 2:54 AM  DISCH DATE:        2021 6:38 PM  RESPONDING  PROVIDER #:        Dez Ackerman MD          QUERY TEXT:    Patient admitted with GI bleed, anemia  Noted documentation of acute post   hemorrhagic anemia per Dr. Beck Heriberto note 21 ;discharge summary states \"   Patient found to have iron deficiency anemia\". If possible, please document in   progress notes and discharge summary if you are evaluating and /or treating   any of the following: The medical record reflects the following:  Risk Factors: GIB  antral polyp, diverticulosis of the colon and internal   hemorrhoid  Clinical Indicators: per discharge summary: EGD and colonoscopy did not show   any acute bleed. . On colonoscopy patient found to have hemorrhoids not active   bleeding and diverticulosis of large intestine. h/h 7.5/23.1   FOBT +  Treatment: GI cardiologist consult  monitor H/H transfuse if HGB <7  EGD   colonoscopy  discharged on PO iron tablets    Thank you,  Ziyad ERNANDEZN RN CDS  contact [facility] Siomara Joni  or  [email or web address]  Options provided:  -- iron deficiency anemia confirmed and acute post hemorrhagic anemia ruled   out  -- acute post hemorrhagic anemia confirmed and iron deficiency anemia ruled   out  -- acute blood loss anemia and iron deficiency anemia confirmed  -- Other - I will add my own diagnosis  -- Disagree - Not applicable / Not valid  -- Disagree - Clinically unable to determine / Unknown  -- Refer to Clinical Documentation Reviewer    PROVIDER RESPONSE TEXT:    After study, iron deficiency anemia confirmed and acute post hemorrhagic   anemia ruled out.     Query created by: Nicolette Irene on 10/12/2021 2:04 PM      Electronically signed by:  Dez Ackerman MD 10/18/2021 12:54 PM

## 2021-11-25 ENCOUNTER — APPOINTMENT (OUTPATIENT)
Dept: CT IMAGING | Age: 66
End: 2021-11-25
Payer: MEDICARE

## 2021-11-25 ENCOUNTER — HOSPITAL ENCOUNTER (EMERGENCY)
Age: 66
Discharge: HOME | End: 2021-11-25
Attending: EMERGENCY MEDICINE
Payer: MEDICARE

## 2021-11-25 VITALS
TEMPERATURE: 98 F | RESPIRATION RATE: 16 BRPM | DIASTOLIC BLOOD PRESSURE: 54 MMHG | SYSTOLIC BLOOD PRESSURE: 145 MMHG | HEART RATE: 108 BPM | OXYGEN SATURATION: 93 % | WEIGHT: 230 LBS | BODY MASS INDEX: 39.48 KG/M2

## 2021-11-25 DIAGNOSIS — E87.6 HYPOKALEMIA: ICD-10-CM

## 2021-11-25 DIAGNOSIS — R41.82 ALTERED MENTAL STATUS, UNSPECIFIED ALTERED MENTAL STATUS TYPE: Primary | ICD-10-CM

## 2021-11-25 LAB
ABSOLUTE EOS #: 0.2 K/UL (ref 0–0.4)
ABSOLUTE IMMATURE GRANULOCYTE: ABNORMAL K/UL (ref 0–0.3)
ABSOLUTE LYMPH #: 2.3 K/UL (ref 1–4.8)
ABSOLUTE MONO #: 0.7 K/UL (ref 0–1)
ALBUMIN SERPL-MCNC: 3.9 G/DL (ref 3.5–5.2)
ALBUMIN/GLOBULIN RATIO: ABNORMAL (ref 1–2.5)
ALP BLD-CCNC: 107 U/L (ref 35–104)
ALT SERPL-CCNC: 23 U/L (ref 5–33)
ANION GAP SERPL CALCULATED.3IONS-SCNC: 10 MMOL/L (ref 9–17)
AST SERPL-CCNC: 34 U/L
BASOPHILS # BLD: 0 % (ref 0–2)
BASOPHILS ABSOLUTE: 0 K/UL (ref 0–0.2)
BILIRUB SERPL-MCNC: 0.31 MG/DL (ref 0.3–1.2)
BILIRUBIN URINE: NEGATIVE
BUN BLDV-MCNC: 3 MG/DL (ref 8–23)
BUN/CREAT BLD: 6 (ref 9–20)
CALCIUM SERPL-MCNC: 9.6 MG/DL (ref 8.6–10.4)
CHLORIDE BLD-SCNC: 92 MMOL/L (ref 98–107)
CO2: 31 MMOL/L (ref 20–31)
COLOR: YELLOW
COMMENT UA: NORMAL
CREAT SERPL-MCNC: 0.48 MG/DL (ref 0.5–0.9)
DIFFERENTIAL TYPE: ABNORMAL
EOSINOPHILS RELATIVE PERCENT: 2 % (ref 0–5)
GFR AFRICAN AMERICAN: >60 ML/MIN
GFR NON-AFRICAN AMERICAN: >60 ML/MIN
GFR SERPL CREATININE-BSD FRML MDRD: ABNORMAL ML/MIN/{1.73_M2}
GFR SERPL CREATININE-BSD FRML MDRD: ABNORMAL ML/MIN/{1.73_M2}
GLUCOSE BLD-MCNC: 195 MG/DL (ref 70–99)
GLUCOSE URINE: NEGATIVE
HCT VFR BLD CALC: 28.6 % (ref 36–46)
HEMOGLOBIN: 9.7 G/DL (ref 12–16)
IMMATURE GRANULOCYTES: ABNORMAL %
KETONES, URINE: NEGATIVE
LEUKOCYTE ESTERASE, URINE: NEGATIVE
LYMPHOCYTES # BLD: 22 % (ref 15–40)
MAGNESIUM: 1.7 MG/DL (ref 1.6–2.6)
MCH RBC QN AUTO: 26.9 PG (ref 26–34)
MCHC RBC AUTO-ENTMCNC: 33.9 G/DL (ref 31–37)
MCV RBC AUTO: 79.4 FL (ref 80–100)
MONOCYTES # BLD: 7 % (ref 4–8)
MORPHOLOGY: ABNORMAL
NITRITE, URINE: NEGATIVE
NRBC AUTOMATED: ABNORMAL PER 100 WBC
PDW BLD-RTO: 19.9 % (ref 12.1–15.2)
PH UA: 6.5 (ref 5–8)
PLATELET # BLD: 307 K/UL (ref 140–450)
PLATELET ESTIMATE: ABNORMAL
PMV BLD AUTO: ABNORMAL FL (ref 6–12)
POTASSIUM SERPL-SCNC: 3 MMOL/L (ref 3.7–5.3)
PROTEIN UA: NEGATIVE
RBC # BLD: 3.61 M/UL (ref 4–5.2)
RBC # BLD: ABNORMAL 10*6/UL
SEG NEUTROPHILS: 69 % (ref 47–75)
SEGMENTED NEUTROPHILS ABSOLUTE COUNT: 7.4 K/UL (ref 2.5–7)
SODIUM BLD-SCNC: 133 MMOL/L (ref 135–144)
SPECIFIC GRAVITY UA: 1.01 (ref 1–1.03)
TOTAL PROTEIN: 7 G/DL (ref 6.4–8.3)
TURBIDITY: CLEAR
URINE HGB: NEGATIVE
UROBILINOGEN, URINE: NORMAL
WBC # BLD: 10.5 K/UL (ref 3.5–11)
WBC # BLD: ABNORMAL 10*3/UL

## 2021-11-25 PROCEDURE — 80053 COMPREHEN METABOLIC PANEL: CPT

## 2021-11-25 PROCEDURE — 6360000002 HC RX W HCPCS: Performed by: EMERGENCY MEDICINE

## 2021-11-25 PROCEDURE — 2580000003 HC RX 258: Performed by: EMERGENCY MEDICINE

## 2021-11-25 PROCEDURE — 99284 EMERGENCY DEPT VISIT MOD MDM: CPT

## 2021-11-25 PROCEDURE — 6370000000 HC RX 637 (ALT 250 FOR IP): Performed by: EMERGENCY MEDICINE

## 2021-11-25 PROCEDURE — 83735 ASSAY OF MAGNESIUM: CPT

## 2021-11-25 PROCEDURE — 85025 COMPLETE CBC W/AUTO DIFF WBC: CPT

## 2021-11-25 PROCEDURE — 96374 THER/PROPH/DIAG INJ IV PUSH: CPT

## 2021-11-25 PROCEDURE — 81003 URINALYSIS AUTO W/O SCOPE: CPT

## 2021-11-25 PROCEDURE — 96375 TX/PRO/DX INJ NEW DRUG ADDON: CPT

## 2021-11-25 PROCEDURE — 70450 CT HEAD/BRAIN W/O DYE: CPT

## 2021-11-25 RX ORDER — NICOTINE 21 MG/24HR
1 PATCH, TRANSDERMAL 24 HOURS TRANSDERMAL DAILY
Status: DISCONTINUED | OUTPATIENT
Start: 2021-11-25 | End: 2021-11-25 | Stop reason: HOSPADM

## 2021-11-25 RX ORDER — LORAZEPAM 2 MG/ML
1 INJECTION INTRAMUSCULAR ONCE
Status: COMPLETED | OUTPATIENT
Start: 2021-11-25 | End: 2021-11-25

## 2021-11-25 RX ORDER — KETOROLAC TROMETHAMINE 30 MG/ML
15 INJECTION, SOLUTION INTRAMUSCULAR; INTRAVENOUS ONCE
Status: COMPLETED | OUTPATIENT
Start: 2021-11-25 | End: 2021-11-25

## 2021-11-25 RX ORDER — 0.9 % SODIUM CHLORIDE 0.9 %
1000 INTRAVENOUS SOLUTION INTRAVENOUS ONCE
Status: COMPLETED | OUTPATIENT
Start: 2021-11-25 | End: 2021-11-25

## 2021-11-25 RX ADMIN — POTASSIUM BICARBONATE 60 MEQ: 782 TABLET, EFFERVESCENT ORAL at 13:35

## 2021-11-25 RX ADMIN — LORAZEPAM 1 MG: 2 INJECTION, SOLUTION INTRAMUSCULAR; INTRAVENOUS at 15:29

## 2021-11-25 RX ADMIN — KETOROLAC TROMETHAMINE 15 MG: 30 INJECTION, SOLUTION INTRAMUSCULAR; INTRAVENOUS at 15:29

## 2021-11-25 RX ADMIN — SODIUM CHLORIDE 1000 ML: 9 INJECTION, SOLUTION INTRAVENOUS at 12:43

## 2021-11-25 ASSESSMENT — PAIN SCALES - GENERAL: PAINLEVEL_OUTOF10: 5

## 2021-11-25 NOTE — PROGRESS NOTES
Sister Rupert Messer contacted and Rupert Messer states that Her mom will be enroute to  patient when she is able to get a ride.  States it may be later this evening 8-9pm.

## 2021-11-25 NOTE — PROGRESS NOTES
Patient becoming agitated at staff, states her mother is 80years old and should not be coming to get her. 

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
## 2022-11-15 ENCOUNTER — HOSPITAL ENCOUNTER (EMERGENCY)
Age: 67
Discharge: OTHER INSTITUTION | End: 2022-11-16
Payer: MEDICARE

## 2022-11-15 DIAGNOSIS — F29 PSYCHOSIS, UNSPECIFIED PSYCHOSIS TYPE (HCC): Primary | ICD-10-CM

## 2022-11-15 LAB
ETHANOL PERCENT: NORMAL G/DL
ETHANOL: <10 MG/DL (ref 0–0.08)

## 2022-11-15 PROCEDURE — 85025 COMPLETE CBC W/AUTO DIFF WBC: CPT

## 2022-11-15 PROCEDURE — 84443 ASSAY THYROID STIM HORMONE: CPT

## 2022-11-15 PROCEDURE — 80053 COMPREHEN METABOLIC PANEL: CPT

## 2022-11-15 PROCEDURE — 36415 COLL VENOUS BLD VENIPUNCTURE: CPT

## 2022-11-15 PROCEDURE — 96372 THER/PROPH/DIAG INJ SC/IM: CPT

## 2022-11-15 PROCEDURE — 80307 DRUG TEST PRSMV CHEM ANLYZR: CPT

## 2022-11-15 PROCEDURE — 99285 EMERGENCY DEPT VISIT HI MDM: CPT

## 2022-11-15 PROCEDURE — 80179 DRUG ASSAY SALICYLATE: CPT

## 2022-11-15 PROCEDURE — 6360000002 HC RX W HCPCS: Performed by: STUDENT IN AN ORGANIZED HEALTH CARE EDUCATION/TRAINING PROGRAM

## 2022-11-15 PROCEDURE — 80061 LIPID PANEL: CPT

## 2022-11-15 PROCEDURE — 80143 DRUG ASSAY ACETAMINOPHEN: CPT

## 2022-11-15 PROCEDURE — 87635 SARS-COV-2 COVID-19 AMP PRB: CPT

## 2022-11-15 PROCEDURE — 81003 URINALYSIS AUTO W/O SCOPE: CPT

## 2022-11-15 PROCEDURE — 82077 ASSAY SPEC XCP UR&BREATH IA: CPT

## 2022-11-15 PROCEDURE — 82550 ASSAY OF CK (CPK): CPT

## 2022-11-15 RX ORDER — HYDRALAZINE HYDROCHLORIDE 20 MG/ML
10 INJECTION INTRAMUSCULAR; INTRAVENOUS ONCE
Status: DISCONTINUED | OUTPATIENT
Start: 2022-11-15 | End: 2022-11-16

## 2022-11-15 RX ORDER — HALOPERIDOL 5 MG/ML
5 INJECTION INTRAMUSCULAR ONCE
Status: COMPLETED | OUTPATIENT
Start: 2022-11-15 | End: 2022-11-15

## 2022-11-15 RX ORDER — LORAZEPAM 2 MG/ML
2 INJECTION INTRAMUSCULAR ONCE
Status: COMPLETED | OUTPATIENT
Start: 2022-11-15 | End: 2022-11-15

## 2022-11-15 RX ADMIN — LORAZEPAM 2 MG: 2 INJECTION INTRAMUSCULAR; INTRAVENOUS at 23:15

## 2022-11-15 RX ADMIN — HALOPERIDOL LACTATE 5 MG: 5 INJECTION, SOLUTION INTRAMUSCULAR at 23:15

## 2022-11-15 ASSESSMENT — PAIN - FUNCTIONAL ASSESSMENT: PAIN_FUNCTIONAL_ASSESSMENT: NONE - DENIES PAIN

## 2022-11-16 VITALS
WEIGHT: 200 LBS | DIASTOLIC BLOOD PRESSURE: 47 MMHG | BODY MASS INDEX: 34.15 KG/M2 | TEMPERATURE: 98.8 F | HEIGHT: 64 IN | OXYGEN SATURATION: 92 % | RESPIRATION RATE: 18 BRPM | SYSTOLIC BLOOD PRESSURE: 115 MMHG | HEART RATE: 69 BPM

## 2022-11-16 LAB
ACETAMINOPHEN LEVEL: <5 UG/ML (ref 10–30)
ALBUMIN SERPL-MCNC: 4.1 G/DL (ref 3.5–4.6)
ALP BLD-CCNC: 98 U/L (ref 40–130)
ALT SERPL-CCNC: 11 U/L (ref 0–33)
AMPHETAMINE SCREEN, URINE: NORMAL
ANION GAP SERPL CALCULATED.3IONS-SCNC: 18 MEQ/L (ref 9–15)
ANISOCYTOSIS: ABNORMAL
AST SERPL-CCNC: 12 U/L (ref 0–35)
BARBITURATE SCREEN URINE: NORMAL
BASOPHILS ABSOLUTE: 0 K/UL (ref 0–0.2)
BASOPHILS RELATIVE PERCENT: 0.7 %
BENZODIAZEPINE SCREEN, URINE: NORMAL
BILIRUB SERPL-MCNC: 0.4 MG/DL (ref 0.2–0.7)
BILIRUBIN URINE: NEGATIVE
BLOOD, URINE: NEGATIVE
BUN BLDV-MCNC: 9 MG/DL (ref 8–23)
CALCIUM SERPL-MCNC: 9.8 MG/DL (ref 8.5–9.9)
CANNABINOID SCREEN URINE: NORMAL
CHLORIDE BLD-SCNC: 89 MEQ/L (ref 95–107)
CHOLESTEROL, TOTAL: 147 MG/DL (ref 0–199)
CLARITY: CLEAR
CO2: 24 MEQ/L (ref 20–31)
COCAINE METABOLITE SCREEN URINE: NORMAL
COLOR: YELLOW
CREAT SERPL-MCNC: 0.78 MG/DL (ref 0.5–0.9)
EKG ATRIAL RATE: 83 BPM
EKG P AXIS: 54 DEGREES
EKG P-R INTERVAL: 150 MS
EKG Q-T INTERVAL: 392 MS
EKG QRS DURATION: 86 MS
EKG QTC CALCULATION (BAZETT): 460 MS
EKG R AXIS: -1 DEGREES
EKG T AXIS: 67 DEGREES
EKG VENTRICULAR RATE: 83 BPM
EOSINOPHILS ABSOLUTE: 2 K/UL (ref 0–0.7)
EOSINOPHILS RELATIVE PERCENT: 13 %
FENTANYL SCREEN, URINE: NORMAL
GFR SERPL CREATININE-BSD FRML MDRD: >60 ML/MIN/{1.73_M2}
GLOBULIN: 3.3 G/DL (ref 2.3–3.5)
GLUCOSE BLD-MCNC: 137 MG/DL (ref 70–99)
GLUCOSE URINE: NEGATIVE MG/DL
HCT VFR BLD CALC: 37.7 % (ref 37–47)
HDLC SERPL-MCNC: 36 MG/DL (ref 40–59)
HEMOGLOBIN: 12.3 G/DL (ref 12–16)
KETONES, URINE: NEGATIVE MG/DL
LDL CHOLESTEROL CALCULATED: 83 MG/DL (ref 0–129)
LEUKOCYTE ESTERASE, URINE: NEGATIVE
LYMPHOCYTES ABSOLUTE: 4.7 K/UL (ref 1–4.8)
LYMPHOCYTES RELATIVE PERCENT: 31 %
Lab: NORMAL
MCH RBC QN AUTO: 27.8 PG (ref 27–31.3)
MCHC RBC AUTO-ENTMCNC: 32.6 % (ref 33–37)
MCV RBC AUTO: 85.3 FL (ref 79.4–94.8)
METHADONE SCREEN, URINE: NORMAL
MONOCYTES ABSOLUTE: 0.5 K/UL (ref 0.2–0.8)
MONOCYTES RELATIVE PERCENT: 2.9 %
NEUTROPHILS ABSOLUTE: 8 K/UL (ref 1.4–6.5)
NEUTROPHILS RELATIVE PERCENT: 53 %
NITRITE, URINE: NEGATIVE
OPIATE SCREEN URINE: NORMAL
OXYCODONE URINE: NORMAL
PDW BLD-RTO: 13.6 % (ref 11.5–14.5)
PH UA: 5.5 (ref 5–9)
PHENCYCLIDINE SCREEN URINE: NORMAL
PLATELET # BLD: 515 K/UL (ref 130–400)
PLATELET SLIDE REVIEW: ABNORMAL
POTASSIUM SERPL-SCNC: 3.3 MEQ/L (ref 3.4–4.9)
PROPOXYPHENE SCREEN: NORMAL
PROTEIN UA: NEGATIVE MG/DL
RBC # BLD: 4.42 M/UL (ref 4.2–5.4)
SALICYLATE, SERUM: <0.3 MG/DL (ref 15–30)
SARS-COV-2, NAAT: NOT DETECTED
SODIUM BLD-SCNC: 131 MEQ/L (ref 135–144)
SPECIFIC GRAVITY UA: 1.01 (ref 1–1.03)
TOTAL CK: 21 U/L (ref 0–170)
TOTAL PROTEIN: 7.4 G/DL (ref 6.3–8)
TRIGL SERPL-MCNC: 138 MG/DL (ref 0–150)
TSH REFLEX: 2.77 UIU/ML (ref 0.44–3.86)
URINE REFLEX TO CULTURE: NORMAL
UROBILINOGEN, URINE: 0.2 E.U./DL
WBC # BLD: 15.1 K/UL (ref 4.8–10.8)

## 2022-11-16 PROCEDURE — 93005 ELECTROCARDIOGRAM TRACING: CPT | Performed by: STUDENT IN AN ORGANIZED HEALTH CARE EDUCATION/TRAINING PROGRAM

## 2022-11-16 RX ORDER — ONDANSETRON 4 MG/1
4 TABLET, ORALLY DISINTEGRATING ORAL ONCE
Status: DISCONTINUED | OUTPATIENT
Start: 2022-11-16 | End: 2022-11-16

## 2022-11-16 NOTE — ED NOTES
Lorraine Santo called from [facility] intake   Accepting Dr. Yudelka Qiu  Nurse to nurse [facility]  11/16/22 3956

## 2022-11-16 NOTE — ED NOTES
This RN assumed care of pt from [facility]. Assessment deferred at this time d/t pt sleeping. RR even and unlabored. No s/s of distress noted. Will continue to monitor pt.       Rosenda Ochoa RN  11/16/22 4463
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
## 2022-11-16 NOTE — ED NOTES
Per Mitesh CUBA. Pt medically clear. Pending transfer to [facility].       Humble Beltran  11/16/22 0401

## 2022-11-16 NOTE — ED NOTES
SPO2 of 92% while sleeping. Pt placed on 2LPM NC with improvement to 98%. No difficulties breathing noted on assessment.       Hailey De La Fuente, RN  11/16/22 5421

## 2022-11-16 NOTE — ED NOTES
Pt resting in bed with eyes closed. Breathing equal and unlabored, chest rise and fall noted. No obvious s/s of distress noted.       Emy Jerez RN  11/16/22 2618

## 2022-11-16 NOTE — ED NOTES
Neel Loo at [facility] patients ETA to [place] is currently [address], RN  11/16/22 7879

## 2022-11-16 NOTE — ED NOTES
Report to Imani Arias RN at [facility]. Per Imani Arias, bed is not available until 1100.       Danielle Cam RN  11/16/22 3852
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
## 2022-11-16 NOTE — ED NOTES
Clean gown and linens applied to pt. Pt cleaned. Pt resting in bed, eating breakfast tray at this time. No obvious s/s of distress noted.       Emy Jerez RN  11/16/22 2765

## 2022-11-16 NOTE — ED NOTES
Breakfast tray placed at bedside. Pt remains asleep with RR even and unlabored.       Efe Montes RN  11/16/22 6929

## 2023-06-20 PROBLEM — E78.2 MIXED HYPERLIPIDEMIA: Status: ACTIVE | Noted: 2023-06-20

## 2023-06-20 PROBLEM — I10 BENIGN ESSENTIAL HYPERTENSION: Status: ACTIVE | Noted: 2023-06-20

## 2023-06-20 PROBLEM — E11.9 DIABETES (MULTI): Status: ACTIVE | Noted: 2023-06-20

## 2023-06-20 PROBLEM — F30.9: Status: ACTIVE | Noted: 2023-06-20

## 2023-06-20 PROBLEM — R19.7 DIARRHEA: Status: ACTIVE | Noted: 2023-06-20

## 2023-06-20 PROBLEM — F17.200 CURRENT EVERY DAY SMOKER: Status: ACTIVE | Noted: 2023-06-20

## 2023-06-20 PROBLEM — D50.9 IRON DEFICIENCY ANEMIA: Status: ACTIVE | Noted: 2023-06-20

## 2023-06-20 PROBLEM — R01.1 CARDIAC MURMUR: Status: ACTIVE | Noted: 2023-06-20

## 2023-06-20 PROBLEM — E66.9 OBESITY: Status: ACTIVE | Noted: 2023-06-20

## 2023-06-20 PROBLEM — I35.0 AORTIC STENOSIS, MODERATE: Status: ACTIVE | Noted: 2023-06-20

## 2023-06-20 RX ORDER — POTASSIUM CHLORIDE 20 MEQ/1
1 TABLET, EXTENDED RELEASE ORAL DAILY
COMMUNITY
Start: 2021-10-08

## 2023-06-20 RX ORDER — METFORMIN HYDROCHLORIDE 850 MG/1
TABLET ORAL
COMMUNITY
Start: 2022-11-14

## 2023-06-20 RX ORDER — FLUTICASONE PROPIONATE AND SALMETEROL 250; 50 UG/1; UG/1
POWDER RESPIRATORY (INHALATION) 2 TIMES DAILY
COMMUNITY
Start: 2021-10-08

## 2023-06-20 RX ORDER — METOPROLOL TARTRATE 50 MG/1
TABLET ORAL 2 TIMES DAILY
COMMUNITY
Start: 2021-04-12

## 2023-06-20 RX ORDER — ARIPIPRAZOLE 10 MG/1
TABLET ORAL
COMMUNITY
Start: 2021-03-16

## 2023-06-20 RX ORDER — CITALOPRAM 10 MG/1
1 TABLET ORAL DAILY
COMMUNITY
Start: 2021-04-01

## 2023-06-20 RX ORDER — FERROUS SULFATE 325(65) MG
TABLET ORAL EVERY OTHER DAY
COMMUNITY
Start: 2022-11-14

## 2023-06-20 RX ORDER — PRAVASTATIN SODIUM 40 MG/1
1 TABLET ORAL DAILY
COMMUNITY
Start: 2021-04-05

## 2023-06-20 RX ORDER — ATORVASTATIN CALCIUM 10 MG/1
TABLET, FILM COATED ORAL
COMMUNITY
Start: 2022-11-14

## 2023-06-20 RX ORDER — KETOCONAZOLE 20 MG/ML
1 SHAMPOO, SUSPENSION TOPICAL SEE ADMIN INSTRUCTIONS
COMMUNITY
Start: 2021-03-16

## 2023-06-20 RX ORDER — OXCARBAZEPINE 600 MG/1
TABLET, FILM COATED ORAL
COMMUNITY
Start: 2021-10-25

## 2023-06-20 RX ORDER — HYDROXYZINE HYDROCHLORIDE 25 MG/1
TABLET, FILM COATED ORAL 3 TIMES DAILY PRN
COMMUNITY
Start: 2022-08-31

## 2023-06-20 RX ORDER — LEVETIRACETAM 250 MG/1
1 TABLET ORAL DAILY
COMMUNITY
Start: 2022-11-30

## 2023-06-20 RX ORDER — METFORMIN HYDROCHLORIDE 750 MG/1
1 TABLET, EXTENDED RELEASE ORAL 2 TIMES DAILY
COMMUNITY
Start: 2021-10-25

## 2023-06-20 RX ORDER — ASENAPINE 10 MG/1
TABLET SUBLINGUAL
COMMUNITY
Start: 2022-11-14

## 2023-06-20 RX ORDER — PANTOPRAZOLE SODIUM 40 MG/1
TABLET, DELAYED RELEASE ORAL 2 TIMES DAILY
COMMUNITY
Start: 2021-10-08

## 2023-06-20 RX ORDER — TRAZODONE HYDROCHLORIDE 50 MG/1
TABLET ORAL
COMMUNITY
Start: 2021-04-12

## 2023-06-20 RX ORDER — OXCARBAZEPINE 150 MG/1
TABLET, FILM COATED ORAL
COMMUNITY
Start: 2021-10-25

## 2023-06-20 RX ORDER — TIOTROPIUM BROMIDE 18 UG/1
CAPSULE ORAL; RESPIRATORY (INHALATION)
COMMUNITY
Start: 2021-10-08

## 2023-06-20 RX ORDER — ASCORBIC ACID 250 MG
1 TABLET ORAL DAILY
COMMUNITY

## 2023-06-20 RX ORDER — ALBUTEROL SULFATE 90 UG/1
AEROSOL, METERED RESPIRATORY (INHALATION) EVERY 4 HOURS PRN
COMMUNITY
Start: 2021-03-01

## 2023-06-20 RX ORDER — MONTELUKAST SODIUM 10 MG/1
TABLET ORAL
COMMUNITY
Start: 2021-04-05

## 2023-06-20 RX ORDER — PRAZOSIN HYDROCHLORIDE 1 MG/1
CAPSULE ORAL
COMMUNITY
Start: 2021-10-25

## 2023-06-27 ENCOUNTER — APPOINTMENT (OUTPATIENT)
Dept: PRIMARY CARE | Facility: CLINIC | Age: 68
End: 2023-06-27
Payer: MEDICARE

## 2023-09-17 ENCOUNTER — APPOINTMENT (OUTPATIENT)
Dept: GENERAL RADIOLOGY | Age: 68
DRG: 885 | End: 2023-09-17
Attending: EMERGENCY MEDICINE
Payer: MEDICARE

## 2023-09-17 ENCOUNTER — HOSPITAL ENCOUNTER (INPATIENT)
Age: 68
LOS: 1 days | Discharge: HOME | DRG: 885 | End: 2023-09-18
Attending: EMERGENCY MEDICINE | Admitting: INTERNAL MEDICINE
Payer: MEDICARE

## 2023-09-17 ENCOUNTER — APPOINTMENT (OUTPATIENT)
Dept: CT IMAGING | Age: 68
DRG: 885 | End: 2023-09-17
Payer: MEDICARE

## 2023-09-17 DIAGNOSIS — R41.0 CONFUSION: Primary | ICD-10-CM

## 2023-09-17 PROBLEM — R41.82 AMS (ALTERED MENTAL STATUS): Status: ACTIVE | Noted: 2023-09-17

## 2023-09-17 PROBLEM — I35.0 AORTIC STENOSIS: Status: ACTIVE | Noted: 2023-09-17

## 2023-09-17 LAB
ALBUMIN SERPL-MCNC: 4.1 G/DL (ref 3.5–5.2)
ALP SERPL-CCNC: 102 U/L (ref 35–104)
ALT SERPL-CCNC: 12 U/L (ref 5–33)
AMPHET UR QL SCN: NEGATIVE
ANION GAP SERPL CALCULATED.3IONS-SCNC: 9 MMOL/L (ref 9–17)
AST SERPL-CCNC: 26 U/L
BARBITURATES UR QL SCN: NEGATIVE
BASOPHILS # BLD: 0.1 K/UL (ref 0–0.2)
BASOPHILS NFR BLD: 1 % (ref 0–2)
BENZODIAZ UR QL: NEGATIVE
BILIRUB SERPL-MCNC: 0.3 MG/DL (ref 0.3–1.2)
BILIRUB UR QL STRIP: NEGATIVE
BUN SERPL-MCNC: 7 MG/DL (ref 8–23)
CALCIUM SERPL-MCNC: 9.8 MG/DL (ref 8.6–10.4)
CANNABINOIDS UR QL SCN: NEGATIVE
CHLORIDE SERPL-SCNC: 95 MMOL/L (ref 98–107)
CLARITY UR: CLEAR
CO2 SERPL-SCNC: 27 MMOL/L (ref 20–31)
COCAINE UR QL SCN: NEGATIVE
COLOR UR: YELLOW
COMMENT: NORMAL
CREAT SERPL-MCNC: 0.6 MG/DL (ref 0.5–0.9)
DATE, STOOL #1: NORMAL
EOSINOPHIL # BLD: 0.1 K/UL (ref 0–0.4)
EOSINOPHILS RELATIVE PERCENT: 2 % (ref 0–4)
ERYTHROCYTE [DISTWIDTH] IN BLOOD BY AUTOMATED COUNT: 15 % (ref 11.5–14.9)
ETHANOL PERCENT: <0.01 %
ETHANOLAMINE SERPL-MCNC: <10 MG/DL
FENTANYL UR QL: NEGATIVE
GFR SERPL CREATININE-BSD FRML MDRD: >60 ML/MIN/1.73M2
GLUCOSE BLD-MCNC: 138 MG/DL (ref 65–105)
GLUCOSE BLD-MCNC: 160 MG/DL (ref 65–105)
GLUCOSE SERPL-MCNC: 134 MG/DL (ref 70–99)
GLUCOSE UR STRIP-MCNC: NEGATIVE MG/DL
HCT VFR BLD AUTO: 33.9 % (ref 36–46)
HEMOCCULT SP1 STL QL: NEGATIVE
HGB BLD-MCNC: 11.2 G/DL (ref 12–16)
HGB UR QL STRIP.AUTO: NEGATIVE
INR PPP: 1
KETONES UR STRIP-MCNC: NEGATIVE MG/DL
LEUKOCYTE ESTERASE UR QL STRIP: NEGATIVE
LIPASE SERPL-CCNC: 49 U/L (ref 13–60)
LYMPHOCYTES NFR BLD: 1.9 K/UL (ref 1–4.8)
LYMPHOCYTES RELATIVE PERCENT: 21 % (ref 24–44)
MAGNESIUM SERPL-MCNC: 2 MG/DL (ref 1.6–2.6)
MCH RBC QN AUTO: 29.4 PG (ref 26–34)
MCHC RBC AUTO-ENTMCNC: 32.9 G/DL (ref 31–37)
MCV RBC AUTO: 89.4 FL (ref 80–100)
METHADONE UR QL: NEGATIVE
MONOCYTES NFR BLD: 0.8 K/UL (ref 0.1–1.3)
MONOCYTES NFR BLD: 8 % (ref 1–7)
NEUTROPHILS NFR BLD: 68 % (ref 36–66)
NEUTS SEG NFR BLD: 6.2 K/UL (ref 1.3–9.1)
NITRITE UR QL STRIP: NEGATIVE
OPIATES UR QL SCN: NEGATIVE
OXYCODONE UR QL SCN: NEGATIVE
PCP UR QL SCN: NEGATIVE
PH UR STRIP: 6 [PH] (ref 5–8)
PLATELET # BLD AUTO: 280 K/UL (ref 150–450)
PMV BLD AUTO: 9.4 FL (ref 6–12)
POTASSIUM SERPL-SCNC: 4.4 MMOL/L (ref 3.7–5.3)
PROT SERPL-MCNC: 7.3 G/DL (ref 6.4–8.3)
PROT UR STRIP-MCNC: NEGATIVE MG/DL
PROTHROMBIN TIME: 13 SEC (ref 11.8–14.6)
RBC # BLD AUTO: 3.79 M/UL (ref 4–5.2)
SODIUM SERPL-SCNC: 131 MMOL/L (ref 135–144)
SP GR UR STRIP: 1 (ref 1–1.03)
T4 FREE SERPL-MCNC: 1.3 NG/DL (ref 0.9–1.7)
TEST INFORMATION: NORMAL
TIME, STOOL #1: 1450
TROPONIN I SERPL HS-MCNC: 11 NG/L (ref 0–14)
TSH SERPL DL<=0.05 MIU/L-ACNC: 1.45 UIU/ML (ref 0.3–5)
UROBILINOGEN UR STRIP-ACNC: NORMAL EU/DL (ref 0–1)
WBC OTHER # BLD: 9.1 K/UL (ref 3.5–11)

## 2023-09-17 PROCEDURE — G0480 DRUG TEST DEF 1-7 CLASSES: HCPCS

## 2023-09-17 PROCEDURE — 82270 OCCULT BLOOD FECES: CPT

## 2023-09-17 PROCEDURE — 99223 1ST HOSP IP/OBS HIGH 75: CPT | Performed by: INTERNAL MEDICINE

## 2023-09-17 PROCEDURE — 2580000003 HC RX 258: Performed by: EMERGENCY MEDICINE

## 2023-09-17 PROCEDURE — 83690 ASSAY OF LIPASE: CPT

## 2023-09-17 PROCEDURE — 82947 ASSAY GLUCOSE BLOOD QUANT: CPT

## 2023-09-17 PROCEDURE — 85025 COMPLETE CBC W/AUTO DIFF WBC: CPT

## 2023-09-17 PROCEDURE — 84443 ASSAY THYROID STIM HORMONE: CPT

## 2023-09-17 PROCEDURE — 80053 COMPREHEN METABOLIC PANEL: CPT

## 2023-09-17 PROCEDURE — 6360000002 HC RX W HCPCS

## 2023-09-17 PROCEDURE — 2580000003 HC RX 258

## 2023-09-17 PROCEDURE — 6370000000 HC RX 637 (ALT 250 FOR IP)

## 2023-09-17 PROCEDURE — 99285 EMERGENCY DEPT VISIT HI MDM: CPT

## 2023-09-17 PROCEDURE — 81003 URINALYSIS AUTO W/O SCOPE: CPT

## 2023-09-17 PROCEDURE — 83735 ASSAY OF MAGNESIUM: CPT

## 2023-09-17 PROCEDURE — 84439 ASSAY OF FREE THYROXINE: CPT

## 2023-09-17 PROCEDURE — 36415 COLL VENOUS BLD VENIPUNCTURE: CPT

## 2023-09-17 PROCEDURE — 93005 ELECTROCARDIOGRAM TRACING: CPT | Performed by: EMERGENCY MEDICINE

## 2023-09-17 PROCEDURE — 1200000000 HC SEMI PRIVATE

## 2023-09-17 PROCEDURE — 70450 CT HEAD/BRAIN W/O DYE: CPT

## 2023-09-17 PROCEDURE — 80307 DRUG TEST PRSMV CHEM ANLYZR: CPT

## 2023-09-17 PROCEDURE — 71045 X-RAY EXAM CHEST 1 VIEW: CPT

## 2023-09-17 PROCEDURE — 84484 ASSAY OF TROPONIN QUANT: CPT

## 2023-09-17 PROCEDURE — 6370000000 HC RX 637 (ALT 250 FOR IP): Performed by: EMERGENCY MEDICINE

## 2023-09-17 PROCEDURE — 85610 PROTHROMBIN TIME: CPT

## 2023-09-17 RX ORDER — ACETAMINOPHEN 650 MG/1
650 SUPPOSITORY RECTAL EVERY 6 HOURS PRN
Status: DISCONTINUED | OUTPATIENT
Start: 2023-09-17 | End: 2023-09-18 | Stop reason: HOSPADM

## 2023-09-17 RX ORDER — ONDANSETRON 2 MG/ML
4 INJECTION INTRAMUSCULAR; INTRAVENOUS EVERY 6 HOURS PRN
Status: DISCONTINUED | OUTPATIENT
Start: 2023-09-17 | End: 2023-09-18 | Stop reason: HOSPADM

## 2023-09-17 RX ORDER — SODIUM CHLORIDE 9 MG/ML
INJECTION, SOLUTION INTRAVENOUS CONTINUOUS
Status: DISCONTINUED | OUTPATIENT
Start: 2023-09-17 | End: 2023-09-18 | Stop reason: HOSPADM

## 2023-09-17 RX ORDER — PANTOPRAZOLE SODIUM 40 MG/1
40 TABLET, DELAYED RELEASE ORAL
Status: DISCONTINUED | OUTPATIENT
Start: 2023-09-18 | End: 2023-09-18 | Stop reason: HOSPADM

## 2023-09-17 RX ORDER — POLYETHYLENE GLYCOL 3350 17 G/17G
17 POWDER, FOR SOLUTION ORAL DAILY PRN
Status: DISCONTINUED | OUTPATIENT
Start: 2023-09-17 | End: 2023-09-18 | Stop reason: HOSPADM

## 2023-09-17 RX ORDER — ONDANSETRON 4 MG/1
4 TABLET, ORALLY DISINTEGRATING ORAL EVERY 8 HOURS PRN
Status: DISCONTINUED | OUTPATIENT
Start: 2023-09-17 | End: 2023-09-18 | Stop reason: HOSPADM

## 2023-09-17 RX ORDER — POTASSIUM CHLORIDE 7.45 MG/ML
10 INJECTION INTRAVENOUS PRN
Status: DISCONTINUED | OUTPATIENT
Start: 2023-09-17 | End: 2023-09-18 | Stop reason: HOSPADM

## 2023-09-17 RX ORDER — NICOTINE 21 MG/24HR
1 PATCH, TRANSDERMAL 24 HOURS TRANSDERMAL DAILY
Status: DISCONTINUED | OUTPATIENT
Start: 2023-09-17 | End: 2023-09-18 | Stop reason: HOSPADM

## 2023-09-17 RX ORDER — METOPROLOL TARTRATE 50 MG/1
50 TABLET, FILM COATED ORAL 2 TIMES DAILY
Status: DISCONTINUED | OUTPATIENT
Start: 2023-09-17 | End: 2023-09-18 | Stop reason: HOSPADM

## 2023-09-17 RX ORDER — NICOTINE 21 MG/24HR
1 PATCH, TRANSDERMAL 24 HOURS TRANSDERMAL EVERY 24 HOURS
Status: DISCONTINUED | OUTPATIENT
Start: 2023-09-17 | End: 2023-09-17 | Stop reason: SDUPTHER

## 2023-09-17 RX ORDER — INSULIN LISPRO 100 [IU]/ML
0-4 INJECTION, SOLUTION INTRAVENOUS; SUBCUTANEOUS
Status: DISCONTINUED | OUTPATIENT
Start: 2023-09-17 | End: 2023-09-18 | Stop reason: HOSPADM

## 2023-09-17 RX ORDER — 0.9 % SODIUM CHLORIDE 0.9 %
1000 INTRAVENOUS SOLUTION INTRAVENOUS ONCE
Status: COMPLETED | OUTPATIENT
Start: 2023-09-17 | End: 2023-09-17

## 2023-09-17 RX ORDER — SODIUM CHLORIDE 0.9 % (FLUSH) 0.9 %
5-40 SYRINGE (ML) INJECTION PRN
Status: DISCONTINUED | OUTPATIENT
Start: 2023-09-17 | End: 2023-09-18 | Stop reason: HOSPADM

## 2023-09-17 RX ORDER — ALBUTEROL SULFATE 90 UG/1
2 AEROSOL, METERED RESPIRATORY (INHALATION) 4 TIMES DAILY PRN
Status: DISCONTINUED | OUTPATIENT
Start: 2023-09-17 | End: 2023-09-18 | Stop reason: HOSPADM

## 2023-09-17 RX ORDER — ENOXAPARIN SODIUM 100 MG/ML
40 INJECTION SUBCUTANEOUS DAILY
Status: DISCONTINUED | OUTPATIENT
Start: 2023-09-17 | End: 2023-09-18 | Stop reason: HOSPADM

## 2023-09-17 RX ORDER — POTASSIUM CHLORIDE 20 MEQ/1
40 TABLET, EXTENDED RELEASE ORAL PRN
Status: DISCONTINUED | OUTPATIENT
Start: 2023-09-17 | End: 2023-09-18 | Stop reason: HOSPADM

## 2023-09-17 RX ORDER — SODIUM CHLORIDE 0.9 % (FLUSH) 0.9 %
5-40 SYRINGE (ML) INJECTION EVERY 12 HOURS SCHEDULED
Status: DISCONTINUED | OUTPATIENT
Start: 2023-09-17 | End: 2023-09-18 | Stop reason: HOSPADM

## 2023-09-17 RX ORDER — ACETAMINOPHEN 325 MG/1
650 TABLET ORAL EVERY 6 HOURS PRN
Status: DISCONTINUED | OUTPATIENT
Start: 2023-09-17 | End: 2023-09-18 | Stop reason: HOSPADM

## 2023-09-17 RX ORDER — SODIUM CHLORIDE 9 MG/ML
INJECTION, SOLUTION INTRAVENOUS PRN
Status: DISCONTINUED | OUTPATIENT
Start: 2023-09-17 | End: 2023-09-18 | Stop reason: HOSPADM

## 2023-09-17 RX ORDER — DEXTROSE MONOHYDRATE 100 MG/ML
INJECTION, SOLUTION INTRAVENOUS CONTINUOUS PRN
Status: DISCONTINUED | OUTPATIENT
Start: 2023-09-17 | End: 2023-09-18 | Stop reason: HOSPADM

## 2023-09-17 RX ORDER — INSULIN LISPRO 100 [IU]/ML
0-4 INJECTION, SOLUTION INTRAVENOUS; SUBCUTANEOUS NIGHTLY
Status: DISCONTINUED | OUTPATIENT
Start: 2023-09-17 | End: 2023-09-18 | Stop reason: HOSPADM

## 2023-09-17 RX ORDER — MAGNESIUM SULFATE HEPTAHYDRATE 40 MG/ML
2000 INJECTION, SOLUTION INTRAVENOUS PRN
Status: DISCONTINUED | OUTPATIENT
Start: 2023-09-17 | End: 2023-09-18 | Stop reason: HOSPADM

## 2023-09-17 RX ADMIN — SODIUM CHLORIDE: 9 INJECTION, SOLUTION INTRAVENOUS at 17:42

## 2023-09-17 RX ADMIN — METOPROLOL TARTRATE 50 MG: 50 TABLET, FILM COATED ORAL at 20:34

## 2023-09-17 RX ADMIN — SODIUM CHLORIDE, PRESERVATIVE FREE 10 ML: 5 INJECTION INTRAVENOUS at 20:34

## 2023-09-17 RX ADMIN — SODIUM CHLORIDE 1000 ML: 9 INJECTION, SOLUTION INTRAVENOUS at 10:02

## 2023-09-17 RX ADMIN — METOPROLOL TARTRATE 50 MG: 50 TABLET, FILM COATED ORAL at 15:11

## 2023-09-17 RX ADMIN — ENOXAPARIN SODIUM 40 MG: 100 INJECTION SUBCUTANEOUS at 17:43

## 2023-09-17 ASSESSMENT — ENCOUNTER SYMPTOMS
STRIDOR: 0
BLOOD IN STOOL: 0
CONSTIPATION: 0
VOMITING: 0
ABDOMINAL PAIN: 0
SHORTNESS OF BREATH: 0
EYE PAIN: 0
CHEST TIGHTNESS: 0
COLOR CHANGE: 0
BACK PAIN: 0
NAUSEA: 0
DIARRHEA: 0
COUGH: 0
SHORTNESS OF BREATH: 1
WHEEZING: 0

## 2023-09-17 ASSESSMENT — PAIN - FUNCTIONAL ASSESSMENT: PAIN_FUNCTIONAL_ASSESSMENT: NONE - DENIES PAIN

## 2023-09-17 NOTE — ED NOTES
Reviewed concerns with patient per request of Dr. Telma Alex. Patient is lethargic but responsive, exhausted, and unable to secure safe transportation to home and manage self in home environment safely upon discharge. Patient is agreeable to be admitted to have concerns worked-up.       Terence Akers RN  09/17/23 0918

## 2023-09-17 NOTE — PLAN OF CARE
Problem: Discharge Planning  Goal: Discharge to home or other facility with appropriate resources  9/17/2023 1937 by Katherine Prescott RN  Outcome: Progressing  Flowsheets (Taken 9/17/2023 1937)  Discharge to home or other facility with appropriate resources: Identify barriers to discharge with patient and caregiver  9/17/2023 1803 by Roxanne Kohli RN  Outcome: Progressing     Problem: Skin/Tissue Integrity  Goal: Absence of new skin breakdown  Description: 1. Monitor for areas of redness and/or skin breakdown  2. Assess vascular access sites hourly  3. Every 4-6 hours minimum:  Change oxygen saturation probe site  4. Every 4-6 hours:  If on nasal continuous positive airway pressure, respiratory therapy assess nares and determine need for appliance change or resting period.   9/17/2023 1937 by Katherine Prescott RN  Outcome: Progressing  9/17/2023 1803 by Roxanne Kohli RN  Outcome: Progressing     Problem: Safety - Adult  Goal: Free from fall injury  9/17/2023 1937 by Katherine Prescott RN  Outcome: Progressing  9/17/2023 1803 by Roxanne Kohli RN  Outcome: Progressing

## 2023-09-17 NOTE — ED NOTES
While preparing patient for transfer to Med/Surg 2043, author received telephone call from patient's sister; Humble Chopra [phone]. Per patient's sister, patient is Bi-Polar and has not been taking her mental health medication as directed. Patient states she is on [place] 750 mg IM and receives a monthly shot. Patient received last shot at beginning of this month (September) per patient report. Patients sister updated as to patient's medical condition and plan to admit to hospital under Dr. Yajaira Martinez for continued evaluation and treatment.       Kirk Cook RN  09/17/23 2730

## 2023-09-17 NOTE — H&P
NEGATIVE NEGATIVE mg/dL    Specific Gravity, UA 1.005 1.000 - 1.030    Urine Hgb NEGATIVE NEGATIVE    pH, UA 6.0 5.0 - 8.0    Protein, UA NEGATIVE NEGATIVE mg/dL    Urobilinogen, Urine Normal 0.0 - 1.0 EU/dL    Nitrite, Urine NEGATIVE NEGATIVE    Leukocyte Esterase, Urine NEGATIVE NEGATIVE    Comment       Microscopic exam not performed based on chemical results unless requested in original order. ETOH    Collection Time: 09/17/23  8:49 AM   Result Value Ref Range    Ethanol <10 <10 mg/dL    Ethanol percent <0.010 %   Drug Scr, Abuse, Ur    Collection Time: 09/17/23  8:49 AM   Result Value Ref Range    Amphetamine Screen, Ur NEGATIVE NEGATIVE    Barbiturate Screen, Ur NEGATIVE NEGATIVE    Benzodiazepine Screen, Urine NEGATIVE NEGATIVE    Cocaine Metabolite, Urine NEGATIVE NEGATIVE    Methadone Screen, Urine NEGATIVE NEGATIVE    Opiates, Urine NEGATIVE NEGATIVE    Phencyclidine, Urine NEGATIVE NEGATIVE    Cannabinoid Scrn, Ur NEGATIVE NEGATIVE    Oxycodone Screen, Ur NEGATIVE NEGATIVE    Fentanyl, Ur NEGATIVE NEGATIVE    Test Information       Assay provides medical screening only. The absence of expected drug(s) and/or metabolite(s) may indicate diluted or adulterated urine, limitations of testing or timing of collection.    Blood Occult Stool Screen #1    Collection Time: 09/17/23  2:50 PM   Result Value Ref Range    Occult Blood, Stool #1 NEGATIVE NEGATIVE    Date, Stool #1 [phone]     Time, Stool #1 1,450    POC Glucose Fingerstick    Collection Time: 09/17/23  4:10 PM   Result Value Ref Range    POC Glucose 138 (H) 65 - 105 mg/dL       Imaging/Diagnostics:  CT Head W/O Contrast    Result Date: 9/17/2023  EXAMINATION: CT OF THE HEAD WITHOUT CONTRAST  9/17/2023 8:55 am TECHNIQUE: CT of the head was performed without the administration of intravenous contrast. Automated exposure control, iterative reconstruction, and/or weight based adjustment of the mA/kV was utilized to reduce the radiation dose to as low as
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
## 2023-09-17 NOTE — ED TRIAGE NOTES
Mode of arrival (squad #, walk in, police, etc) : [facility]        Chief complaint(s): Possible confusion at times (A&O X4), foul smelling urine, urine filled brief        Arrival Note (brief scenario, treatment PTA, etc). : Patient was brought in from [facility] after being pulled over by [employer] for [place] [address]. C= \"Have you ever felt that you should Cut down on your drinking? \"  No  A= \"Have people Annoyed you by criticizing your drinking? \"  No  G= \"Have you ever felt bad or Guilty about your drinking? \"  No  E= \"Have you ever had a drink as an Eye-opener first thing in the morning to steady your nerves or to help a hangover? \"  No      Deferred []      Reason for deferring: N/A    *If yes to two or more: probable alcohol abuse. *

## 2023-09-17 NOTE — ED NOTES
Patient lives in a house with her mother and sister. Patient's sister is in a nursing home for her dementia. Patient's mother recently was hospitalized for cardiac reasons and is being placed in a nursing home for rehabilitation. There are no other members living with patient in this residence, per patient report.       Rafy Winter RN  09/17/23 7980

## 2023-09-17 NOTE — ED PROVIDER NOTES
below, the results are reviewed by myself, and all abnormals are listed below. Labs Reviewed   CBC WITH AUTO DIFFERENTIAL - Abnormal; Notable for the following components:       Result Value    RBC 3.79 (*)     Hemoglobin 11.2 (*)     Hematocrit 33.9 (*)     RDW 15.0 (*)     Neutrophils % 68 (*)     Lymphocytes % 21 (*)     Monocytes % 8 (*)     All other components within normal limits   COMPREHENSIVE METABOLIC PANEL - Abnormal; Notable for the following components:    Sodium 131 (*)     Chloride 95 (*)     Glucose 134 (*)     BUN 7 (*)     All other components within normal limits   LIPASE   MAGNESIUM   PROTIME-INR   TROPONIN   TSH   T4, FREE   URINALYSIS WITH REFLEX TO CULTURE   ETHANOL   BLOOD OCCULT STOOL SCREEN #1   URINE DRUG SCREEN       Vitals Reviewed:    Vitals:    09/17/23 1341 09/17/23 1356 09/17/23 1441 09/17/23 1511   BP: (!) 152/62 (!) 147/89 121/72 (!) 144/78   Pulse: 94 90  96   Resp: 18 20     Temp:       TempSrc:       SpO2: 94% 98%     Weight:       Height:         MEDICATIONS GIVEN TO PATIENT THIS ENCOUNTER:  Orders Placed This Encounter   Medications    sodium chloride 0.9 % bolus 1,000 mL    nicotine (NICODERM CQ) 21 MG/24HR 1 patch    metoprolol tartrate (LOPRESSOR) tablet 50 mg    DISCONTD: nicotine (NICODERM CQ) 21 MG/24HR 1 patch    insulin lispro (HUMALOG) injection vial 0-4 Units    insulin lispro (HUMALOG) injection vial 0-4 Units    glucose chewable tablet 16 g    OR Linked Order Group     dextrose bolus 10% 125 mL     dextrose bolus 10% 250 mL    glucagon injection 1 mg    dextrose 10 % infusion     DISCHARGE PRESCRIPTIONS:  New Prescriptions    No medications on file     PHYSICIAN CONSULTS ORDERED THIS ENCOUNTER:  IP CONSULT TO INTERNAL MEDICINE  IP CONSULT TO PSYCHIATRY  IP CONSULT TO SOCIAL WORK  FINAL IMPRESSION      1.  Confusion          DISPOSITION/PLAN   DISPOSITION Admitted 09/17/2023 01:38:56 PM      OUTPATIENT FOLLOW UP THE PATIENT:  No follow-up provider

## 2023-09-17 NOTE — ED NOTES
Nicoderm CQ 21mg/24hr patch X1 was placed on the back of the patient's left shoulder (per patient's request)     Teri Crowley RN  09/17/23 0090

## 2023-09-18 VITALS
TEMPERATURE: 98.6 F | BODY MASS INDEX: 35.34 KG/M2 | RESPIRATION RATE: 18 BRPM | SYSTOLIC BLOOD PRESSURE: 160 MMHG | DIASTOLIC BLOOD PRESSURE: 77 MMHG | HEART RATE: 87 BPM | OXYGEN SATURATION: 96 % | WEIGHT: 207 LBS | HEIGHT: 64 IN

## 2023-09-18 LAB
ANION GAP SERPL CALCULATED.3IONS-SCNC: 8 MMOL/L (ref 9–17)
BASOPHILS # BLD: 0 K/UL (ref 0–0.2)
BASOPHILS NFR BLD: 0 % (ref 0–2)
BUN SERPL-MCNC: 5 MG/DL (ref 8–23)
CALCIUM SERPL-MCNC: 9.4 MG/DL (ref 8.6–10.4)
CHLORIDE SERPL-SCNC: 105 MMOL/L (ref 98–107)
CO2 SERPL-SCNC: 25 MMOL/L (ref 20–31)
CREAT SERPL-MCNC: 0.5 MG/DL (ref 0.5–0.9)
EOSINOPHIL # BLD: 0.2 K/UL (ref 0–0.4)
EOSINOPHILS RELATIVE PERCENT: 2 % (ref 0–4)
ERYTHROCYTE [DISTWIDTH] IN BLOOD BY AUTOMATED COUNT: 15.2 % (ref 11.5–14.9)
GFR SERPL CREATININE-BSD FRML MDRD: >60 ML/MIN/1.73M2
GLUCOSE BLD-MCNC: 137 MG/DL (ref 65–105)
GLUCOSE BLD-MCNC: 147 MG/DL (ref 65–105)
GLUCOSE SERPL-MCNC: 143 MG/DL (ref 70–99)
HCT VFR BLD AUTO: 31 % (ref 36–46)
HCT VFR BLD AUTO: 31.3 % (ref 36–46)
HGB BLD-MCNC: 10 G/DL (ref 12–16)
HGB BLD-MCNC: 10.6 G/DL (ref 12–16)
LYMPHOCYTES NFR BLD: 2.1 K/UL (ref 1–4.8)
LYMPHOCYTES RELATIVE PERCENT: 25 % (ref 24–44)
MCH RBC QN AUTO: 30.1 PG (ref 26–34)
MCHC RBC AUTO-ENTMCNC: 33.8 G/DL (ref 31–37)
MCV RBC AUTO: 88.9 FL (ref 80–100)
MONOCYTES NFR BLD: 0.7 K/UL (ref 0.1–1.3)
MONOCYTES NFR BLD: 8 % (ref 1–7)
NEUTROPHILS NFR BLD: 65 % (ref 36–66)
NEUTS SEG NFR BLD: 5.4 K/UL (ref 1.3–9.1)
PLATELET # BLD AUTO: 266 K/UL (ref 150–450)
PMV BLD AUTO: 9.7 FL (ref 6–12)
POTASSIUM SERPL-SCNC: 4.2 MMOL/L (ref 3.7–5.3)
RBC # BLD AUTO: 3.52 M/UL (ref 4–5.2)
SODIUM SERPL-SCNC: 138 MMOL/L (ref 135–144)
WBC OTHER # BLD: 8.4 K/UL (ref 3.5–11)

## 2023-09-18 PROCEDURE — 6370000000 HC RX 637 (ALT 250 FOR IP): Performed by: NURSE PRACTITIONER

## 2023-09-18 PROCEDURE — 97165 OT EVAL LOW COMPLEX 30 MIN: CPT

## 2023-09-18 PROCEDURE — 80048 BASIC METABOLIC PNL TOTAL CA: CPT

## 2023-09-18 PROCEDURE — 2580000003 HC RX 258

## 2023-09-18 PROCEDURE — 36415 COLL VENOUS BLD VENIPUNCTURE: CPT

## 2023-09-18 PROCEDURE — 97116 GAIT TRAINING THERAPY: CPT

## 2023-09-18 PROCEDURE — 6370000000 HC RX 637 (ALT 250 FOR IP)

## 2023-09-18 PROCEDURE — 97530 THERAPEUTIC ACTIVITIES: CPT

## 2023-09-18 PROCEDURE — 85018 HEMOGLOBIN: CPT

## 2023-09-18 PROCEDURE — 85014 HEMATOCRIT: CPT

## 2023-09-18 PROCEDURE — 97161 PT EVAL LOW COMPLEX 20 MIN: CPT

## 2023-09-18 PROCEDURE — 99232 SBSQ HOSP IP/OBS MODERATE 35: CPT | Performed by: INTERNAL MEDICINE

## 2023-09-18 PROCEDURE — 85025 COMPLETE CBC W/AUTO DIFF WBC: CPT

## 2023-09-18 PROCEDURE — 6370000000 HC RX 637 (ALT 250 FOR IP): Performed by: EMERGENCY MEDICINE

## 2023-09-18 PROCEDURE — 6360000002 HC RX W HCPCS

## 2023-09-18 PROCEDURE — 99221 1ST HOSP IP/OBS SF/LOW 40: CPT | Performed by: PSYCHIATRY & NEUROLOGY

## 2023-09-18 PROCEDURE — 82947 ASSAY GLUCOSE BLOOD QUANT: CPT

## 2023-09-18 RX ORDER — PANTOPRAZOLE SODIUM 40 MG/1
40 TABLET, DELAYED RELEASE ORAL
Qty: 30 TABLET | Refills: 3 | Status: SHIPPED | OUTPATIENT
Start: 2023-09-19

## 2023-09-18 RX ORDER — POLYETHYLENE GLYCOL 3350 17 G
4 POWDER IN PACKET (EA) ORAL
Status: DISCONTINUED | OUTPATIENT
Start: 2023-09-18 | End: 2023-09-18 | Stop reason: HOSPADM

## 2023-09-18 RX ORDER — HYDRALAZINE HYDROCHLORIDE 20 MG/ML
10 INJECTION INTRAMUSCULAR; INTRAVENOUS EVERY 6 HOURS PRN
Status: DISCONTINUED | OUTPATIENT
Start: 2023-09-18 | End: 2023-09-18 | Stop reason: HOSPADM

## 2023-09-18 RX ADMIN — ENOXAPARIN SODIUM 40 MG: 100 INJECTION SUBCUTANEOUS at 08:40

## 2023-09-18 RX ADMIN — NICOTINE POLACRILEX 4 MG: 2 LOZENGE ORAL at 00:49

## 2023-09-18 RX ADMIN — ACETAMINOPHEN 650 MG: 325 TABLET ORAL at 07:15

## 2023-09-18 RX ADMIN — METOPROLOL TARTRATE 50 MG: 50 TABLET, FILM COATED ORAL at 08:41

## 2023-09-18 RX ADMIN — SODIUM CHLORIDE: 9 INJECTION, SOLUTION INTRAVENOUS at 08:45

## 2023-09-18 RX ADMIN — PANTOPRAZOLE SODIUM 40 MG: 40 TABLET, DELAYED RELEASE ORAL at 05:42

## 2023-09-18 ASSESSMENT — ENCOUNTER SYMPTOMS
SORE THROAT: 0
SINUS PRESSURE: 0
ABDOMINAL PAIN: 0
VOMITING: 0
SHORTNESS OF BREATH: 0
DIARRHEA: 0
SINUS PAIN: 0
NAUSEA: 0

## 2023-09-18 ASSESSMENT — PAIN SCALES - GENERAL: PAINLEVEL_OUTOF10: 3

## 2023-09-18 NOTE — CARE COORDINATION
Case Management Assessment  Initial Evaluation    Date/Time of Evaluation: 9/18/2023 2:16 PM  Assessment Completed by: Bhaskar Gomez RN    If patient is discharged prior to next notation, then this note serves as note for discharge by case management. Patient Name: Gordon Gonzales                   YOB: 1955  Diagnosis: Confusion [R41.0]  AMS (altered mental status) [R41.82]                   Date / Time: 9/17/2023  7:08 AM    Patient Admission Status: Inpatient   Readmission Risk (Low < 19, Mod (19-27), High > 27): Readmission Risk Score: 8.4    Current PCP: No primary care provider on file. PCP verified by CM? Yes    Chart Reviewed: Yes      History Provided by: Patient  Patient Orientation: Alert and Oriented    Patient Cognition: Alert    Hospitalization in the last 30 days (Readmission):  No    If yes, Readmission Assessment in CM Navigator will be completed. Advance Directives:      Code Status: Full Code   Patient's Primary Decision Maker is:        Discharge Planning:    Patient lives with: Family Members Type of Home: House  Primary Care Giver: Self  Patient Support Systems include: Family Members   Current Financial resources: Medicare  Current community resources: None  Current services prior to admission: None            Current DME:              Type of Home Care services:  None    ADLS  Prior functional level: Independent in ADLs/IADLs  Current functional level: Independent in ADLs/IADLs    PT AM-PAC: 22 /24  OT AM-PAC: 23 /24    Family can provide assistance at DC: Yes  Would you like Case Management to discuss the discharge plan with any other family members/significant others, and if so, who?  No  Plans to Return to Present Housing: Yes  Other Identified Issues/Barriers to RETURNING to current housing: no  Potential Assistance needed at discharge: N/A            Potential DME:  n/a  Patient expects to discharge to: [address] [place] [address] for transportation at discharge:

## 2023-09-18 NOTE — PROGRESS NOTES
[address]    PROGRESS NOTE             9/18/2023    7:11 AM    Name:   Tanja Bernal  MRN:     [id number]     Acct:      [de-identified]   Room:   2043/2043-01   Day:  1  Admit Date:  9/17/2023  7:08 AM    PCP:  No primary care provider on file. Code Status:  Full Code    Subjective:     C/C:   Chief Complaint   Patient presents with    Illness     Patient was driving wrong way on [address], came in with foul smelling urine/soaked brief. Interval History Status: improved. Patient was seen and examined at bedside this morning. She was resting comfortably in bed, eating her breakfast.  She was more tearful this morning describing her family situation and her mother being sick. Otherwise, she is doing well and has no other acute concerns or questions at this time. Brief History:       Tanja Bernal is a 70 y.o. female with a past medical history of Type 2 diabetes, HTN, peptic ulcer disease, aortic stenosis, bipolar disorder, and irritable bowel syndrome who presents after the patient was found by police driving the wrong way on [address]. According to the patient, she was driving all through the night to meet her niece whose phone number she does not have, has not spoken to in months, and did not know her address. The patient is from the [facility] and she states that she was attempting to visit her niece in [place]. She states she is under a lot of stress because of possibility of TAVR procedure, the hospitalization of her mother, and tension between her and her brother regarding decision making statements. Patient states she did not get her pill pack of meds this past Friday. She takes Hiveoo monthly shot for mood disorder. The patient says she is exhausting and was driving for an entire day. She states that she just \"got lost\".   The patient states she has been smoking up to 5 packs of cigarettes per day due to her increased
CLINICAL PHARMACY NOTE: MEDS TO BEDS    Total # of Prescriptions Filled: 1   The following medications were delivered to the patient:  Pantoprazole 40mg    Additional Documentation:  Delivered medications to patients room  Nicotine not covered OTC
Cab called to transport patient to car.
[facility]: PARUL MAURER   Occupational Therapy Evaluation  Date: 23  Patient Name: Belle Dempsey       Room: 3137/4133-22  MRN: [id number]  Account: [de-identified]   : 1955  (74 y.o.) Gender: female     Discharge Recommendations: The patient's needs are being met with no further Occupational Therapy recommended at discharge. Referring Practitioner: Mikey Douglas MD  Diagnosis: Confusion         Past Medical History:  has a past medical history of Allergic rhinitis, Bipolar 1 disorder ([address]), Depression, Hyperlipidemia, Hypertension, Irritable bowel syndrome, and PTSD (post-traumatic stress disorder). Past Surgical History:   has a past surgical history that includes Cholecystectomy; Hysterectomy; Upper gastrointestinal endoscopy (N/A, 2021); and Colonoscopy (N/A, 2021). Restrictions  Restrictions/Precautions  Restrictions/Precautions: Fall Risk;General Precautions  Required Braces or Orthoses?: No  Implants present? :  (Pt denies)      Vitals  Vitals  Pulse: 87  BP: (!) 160/77  MAP (Calculated): 105  Respirations: 18  SpO2: 96 %  O2 Device: None (Room air)     Subjective  Subjective: \"There was so much stress in my life. \" Pt was pleasant and agreeable to OT/PT eval  Comments: Ok per [employer] for OT/PT eval  Subjective  Pain: Pt denies pain at rest      Social/Functional History  Social/Functional History  Lives With: Family, Other (comment) (Mother and sister (both have been sent into a rehab facility and sister will be staying LTC);  Pt reports that she has a katelyn that fixes things that now lives in the basement)  Type of Home: House  Home Layout: One level  Home Access: Ramped entrance  Bathroom Shower/Tub: Walk-in shower, Curtain, Shower chair with back  [employer]: Handicap height  Bathroom Equipment: Shower chair, Grab bars in shower, Hand-held shower, Grab bars around toilet  Bathroom Accessibility: [facility] [facility] [address]: [place], rolling,
Examined the patient with digital rectal exam at bedside with Dr. Mak Anderson as chaperone. The exam was unremarkable. There was no blood visualized. The patient's repeat H&H was stable at 10. The patient was medically cleared for discharge.     Lorri Goff MD  PGY1-Transitional Year Resident  9/18/2023  1:58 PM
Pt given discharge instructions/AVS. Pt verbalized understanding using teaching back method. Pt is stable and got belongings with her including cell phone,clothes, and purse. Got discharge in stable conditions via wheelchair. Assisted to front lobby to wait for cab.
RN tried to review home medication with patient. Patient alert and oriented but kept falling asleep during care and when asked questions. Will let patient sleep at this time and try to review medications again in the morning. May need to call pharmacy for verification.   [facility] RN
Time   Individual Concurrent Group Co-treatment   Time In 1102         Time Out 1125         Minutes 23         Timed Code Treatment Minutes: 8 Minutes       Oleg Francisco, PT

## 2023-09-18 NOTE — DISCHARGE SUMMARY
/ Micro:  CBC:   Lab Results   Component Value Date/Time    WBC 8.4 09/18/2023 05:35 AM    RBC 3.52 09/18/2023 05:35 AM    RBC 4.60 06/11/2018 10:05 AM    HGB 10.0 09/18/2023 12:36 PM    HCT 31.0 09/18/2023 12:36 PM    MCV 88.9 09/18/2023 05:35 AM    MCH 30.1 09/18/2023 05:35 AM    MCHC 33.8 09/18/2023 05:35 AM    RDW 15.2 09/18/2023 05:35 AM     09/18/2023 05:35 AM     BMP:    Lab Results   Component Value Date/Time    GLUCOSE 143 09/18/2023 05:35 AM    GLUCOSE 125 04/27/2023 12:09 PM     09/18/2023 05:35 AM    K 4.2 09/18/2023 05:35 AM    K 4.1 03/03/2019 08:56 AM     09/18/2023 05:35 AM    CO2 25 09/18/2023 05:35 AM    ANIONGAP 8 09/18/2023 05:35 AM    BUN 5 09/18/2023 05:35 AM    CREATININE 0.5 09/18/2023 05:35 AM    BUNCRER 6 11/25/2021 12:27 PM    CALCIUM 9.4 09/18/2023 05:35 AM    LABGLOM >60 09/18/2023 05:35 AM    GFRAA >60.0 03/31/2022 06:09 AM    GFR      11/25/2021 12:27 PM    GFR NOT REPORTED 11/25/2021 12:27 PM     U/A:    Lab Results   Component Value Date/Time    COLORU Yellow 09/17/2023 08:49 AM    TURBIDITY Clear 09/17/2023 08:49 AM    SPECGRAV 1.005 09/17/2023 08:49 AM    HGBUR NEGATIVE 09/17/2023 08:49 AM    PHUR 6.0 09/17/2023 08:49 AM    PROTEINU NEGATIVE 09/17/2023 08:49 AM    GLUCOSEU NEGATIVE 09/17/2023 08:49 AM    KETUA NEGATIVE 09/17/2023 08:49 AM    BILIRUBINUR NEGATIVE 09/17/2023 08:49 AM    BILIRUBINUR NEGATIVE 04/27/2023 11:25 AM    UROBILINOGEN Normal 09/17/2023 08:49 AM    NITRU NEGATIVE 09/17/2023 08:49 AM    LEUKOCYTESUR NEGATIVE 09/17/2023 08:49 AM       Radiology:    CT Head W/O Contrast    Result Date: 9/17/2023  EXAMINATION: CT OF THE HEAD WITHOUT CONTRAST  9/17/2023 8:55 am TECHNIQUE: CT of the head was performed without the administration of intravenous contrast. Automated exposure control, iterative reconstruction, and/or weight based adjustment of the mA/kV was utilized to reduce the radiation dose to as low as reasonably achievable.  COMPARISON: 11/25/2021

## 2023-09-18 NOTE — CARE COORDINATION
DISCHARGE PLANNING NOTE:    Pt states her car is at the [facility] in [facility] at the truck stop. [address] [facility], [facility]. Pt will need cab ride there on discharge.     Electronically signed by Dolores Jaimes RN on 9/18/2023 at 2:07 PM

## 2023-09-18 NOTE — DISCHARGE INSTRUCTIONS
Follow up with your PCP in 1 week. Follow up with [facility]. Follow up with out patient psychiatry for Invega injection.

## 2023-09-18 NOTE — CONSULTS
circumstantial. Focusing on family stressors   Thought content:  Preoccupied with family stressors  Cognition:  oriented to person, place, and time   Concentration intact  Memory intact  Insight fair   Judgement fair   Fund of Knowledge adequate        LABS: REVIEWED TODAY:  Recent Labs     09/17/23  0849 09/18/23  0535   WBC 9.1 8.4   HGB 11.2* 10.6*    266     Recent Labs     09/17/23  0849 09/18/23  0535   * 138   K 4.4 4.2   CL 95* 105   CO2 27 25   BUN 7* 5*   CREATININE 0.6 0.5   GLUCOSE 134* 143*     Recent Labs     09/17/23  0849   BILITOT 0.3   ALKPHOS 102   AST 26   ALT 12     Lab Results   Component Value Date/Time    LABAMPH PRESUMPTIVE NEGATIVE 04/27/2023 11:25 AM    BARBSCNU NEGATIVE 09/17/2023 08:49 AM    LABBENZ NEGATIVE 09/17/2023 08:49 AM    LABMETH NEGATIVE 09/17/2023 08:49 AM    OPIATESCREENURINE Neg 11/15/2022 10:45 PM    PHENCYCLIDINESCREENURINE PRESUMPTIVE NEGATIVE 04/27/2023 11:25 AM    ETOH <10 11/15/2022 11:00 PM     Lab Results   Component Value Date/Time    TSH 1.45 09/17/2023 08:49 AM     No results found for: \"LITHIUM\"  Lab Results   Component Value Date    VALPROATE 53.3 06/30/2016     Lab Results   Component Value Date/Time    VALPROATE 53.3 06/30/2016 01:20 PM       FURTHER LABS ORDERED :      Radiology   CT Head W/O Contrast    Result Date: 9/17/2023  EXAMINATION: CT OF THE HEAD WITHOUT CONTRAST  9/17/2023 8:55 am TECHNIQUE: CT of the head was performed without the administration of intravenous contrast. Automated exposure control, iterative reconstruction, and/or weight based adjustment of the mA/kV was utilized to reduce the radiation dose to as low as reasonably achievable.  COMPARISON: 11/25/2021 HISTORY: ORDERING SYSTEM PROVIDED HISTORY: ams TECHNOLOGIST PROVIDED HISTORY: ams Decision Support Exception - unselect if not a suspected or confirmed emergency medical condition->Emergency Medical Condition (MA) Reason for Exam: ams Additional signs and symptoms: ams

## 2023-09-19 LAB
EKG ATRIAL RATE: 100 BPM
EKG P AXIS: 34 DEGREES
EKG P-R INTERVAL: 148 MS
EKG Q-T INTERVAL: 360 MS
EKG QRS DURATION: 76 MS
EKG QTC CALCULATION (BAZETT): 464 MS
EKG R AXIS: 4 DEGREES
EKG T AXIS: 69 DEGREES
EKG VENTRICULAR RATE: 100 BPM

## 2023-09-19 PROCEDURE — 93010 ELECTROCARDIOGRAM REPORT: CPT | Performed by: INTERNAL MEDICINE

## 2023-12-14 ENCOUNTER — APPOINTMENT (OUTPATIENT)
Dept: PRIMARY CARE | Facility: CLINIC | Age: 68
End: 2023-12-14
Payer: MEDICARE

## 2024-03-24 ENCOUNTER — HOSPITAL ENCOUNTER (EMERGENCY)
Facility: HOSPITAL | Age: 69
Discharge: HOME | End: 2024-03-24
Attending: EMERGENCY MEDICINE
Payer: MEDICARE

## 2024-03-24 ENCOUNTER — APPOINTMENT (OUTPATIENT)
Dept: RADIOLOGY | Facility: HOSPITAL | Age: 69
End: 2024-03-24
Payer: MEDICARE

## 2024-03-24 ENCOUNTER — APPOINTMENT (OUTPATIENT)
Dept: CARDIOLOGY | Facility: HOSPITAL | Age: 69
End: 2024-03-24
Payer: MEDICARE

## 2024-03-24 VITALS
RESPIRATION RATE: 18 BRPM | DIASTOLIC BLOOD PRESSURE: 55 MMHG | TEMPERATURE: 97.7 F | WEIGHT: 200 LBS | HEIGHT: 63 IN | BODY MASS INDEX: 35.44 KG/M2 | OXYGEN SATURATION: 98 % | SYSTOLIC BLOOD PRESSURE: 110 MMHG | HEART RATE: 82 BPM

## 2024-03-24 DIAGNOSIS — R53.81 MALAISE AND FATIGUE: Primary | ICD-10-CM

## 2024-03-24 DIAGNOSIS — R53.83 MALAISE AND FATIGUE: Primary | ICD-10-CM

## 2024-03-24 DIAGNOSIS — D64.9 ANEMIA, UNSPECIFIED TYPE: ICD-10-CM

## 2024-03-24 LAB
ALBUMIN SERPL BCP-MCNC: 3.6 G/DL (ref 3.4–5)
ALP SERPL-CCNC: 74 U/L (ref 33–136)
ALT SERPL W P-5'-P-CCNC: 6 U/L (ref 7–45)
ANION GAP SERPL CALC-SCNC: <7 MMOL/L (ref 10–20)
APPEARANCE UR: CLEAR
APTT PPP: 33 SECONDS (ref 27–38)
AST SERPL W P-5'-P-CCNC: 9 U/L (ref 9–39)
BASOPHILS # BLD AUTO: 0.02 X10*3/UL (ref 0–0.1)
BASOPHILS NFR BLD AUTO: 0.3 %
BILIRUB SERPL-MCNC: 0.3 MG/DL (ref 0–1.2)
BILIRUB UR STRIP.AUTO-MCNC: NEGATIVE MG/DL
BNP SERPL-MCNC: 217 PG/ML (ref 0–99)
BUN SERPL-MCNC: 7 MG/DL (ref 6–23)
CALCIUM SERPL-MCNC: 8.8 MG/DL (ref 8.6–10.3)
CARDIAC TROPONIN I PNL SERPL HS: 9 NG/L (ref 0–13)
CHLORIDE SERPL-SCNC: 99 MMOL/L (ref 98–107)
CO2 SERPL-SCNC: 29 MMOL/L (ref 21–32)
COLOR UR: YELLOW
CREAT SERPL-MCNC: 0.68 MG/DL (ref 0.5–1.05)
EGFRCR SERPLBLD CKD-EPI 2021: >90 ML/MIN/1.73M*2
EOSINOPHIL # BLD AUTO: 0.12 X10*3/UL (ref 0–0.7)
EOSINOPHIL NFR BLD AUTO: 1.5 %
ERYTHROCYTE [DISTWIDTH] IN BLOOD BY AUTOMATED COUNT: 13.4 % (ref 11.5–14.5)
FLUAV RNA RESP QL NAA+PROBE: NOT DETECTED
FLUBV RNA RESP QL NAA+PROBE: NOT DETECTED
GLUCOSE SERPL-MCNC: 87 MG/DL (ref 74–99)
GLUCOSE UR STRIP.AUTO-MCNC: NEGATIVE MG/DL
HCT VFR BLD AUTO: 31.8 % (ref 36–46)
HGB BLD-MCNC: 10.3 G/DL (ref 12–16)
HOLD SPECIMEN: NORMAL
IMM GRANULOCYTES # BLD AUTO: 0.03 X10*3/UL (ref 0–0.7)
IMM GRANULOCYTES NFR BLD AUTO: 0.4 % (ref 0–0.9)
INR PPP: 1 (ref 0.9–1.1)
KETONES UR STRIP.AUTO-MCNC: NEGATIVE MG/DL
LEUKOCYTE ESTERASE UR QL STRIP.AUTO: NEGATIVE
LYMPHOCYTES # BLD AUTO: 2.02 X10*3/UL (ref 1.2–4.8)
LYMPHOCYTES NFR BLD AUTO: 25.3 %
MAGNESIUM SERPL-MCNC: 1.56 MG/DL (ref 1.6–2.4)
MCH RBC QN AUTO: 29 PG (ref 26–34)
MCHC RBC AUTO-ENTMCNC: 32.4 G/DL (ref 32–36)
MCV RBC AUTO: 90 FL (ref 80–100)
MONOCYTES # BLD AUTO: 0.6 X10*3/UL (ref 0.1–1)
MONOCYTES NFR BLD AUTO: 7.5 %
NEUTROPHILS # BLD AUTO: 5.19 X10*3/UL (ref 1.2–7.7)
NEUTROPHILS NFR BLD AUTO: 65 %
NITRITE UR QL STRIP.AUTO: NEGATIVE
NRBC BLD-RTO: 0 /100 WBCS (ref 0–0)
PH UR STRIP.AUTO: 6 [PH]
PLATELET # BLD AUTO: 318 X10*3/UL (ref 150–450)
POTASSIUM SERPL-SCNC: 3.4 MMOL/L (ref 3.5–5.3)
PROT SERPL-MCNC: 6.3 G/DL (ref 6.4–8.2)
PROT UR STRIP.AUTO-MCNC: NEGATIVE MG/DL
PROTHROMBIN TIME: 11.5 SECONDS (ref 9.8–12.8)
RBC # BLD AUTO: 3.55 X10*6/UL (ref 4–5.2)
RBC # UR STRIP.AUTO: NEGATIVE /UL
SARS-COV-2 RNA RESP QL NAA+PROBE: NOT DETECTED
SODIUM SERPL-SCNC: 131 MMOL/L (ref 136–145)
SP GR UR STRIP.AUTO: 1.01
UROBILINOGEN UR STRIP.AUTO-MCNC: <2 MG/DL
WBC # BLD AUTO: 8 X10*3/UL (ref 4.4–11.3)

## 2024-03-24 PROCEDURE — 87636 SARSCOV2 & INF A&B AMP PRB: CPT | Performed by: EMERGENCY MEDICINE

## 2024-03-24 PROCEDURE — 93005 ELECTROCARDIOGRAM TRACING: CPT

## 2024-03-24 PROCEDURE — 80053 COMPREHEN METABOLIC PANEL: CPT | Performed by: EMERGENCY MEDICINE

## 2024-03-24 PROCEDURE — 85730 THROMBOPLASTIN TIME PARTIAL: CPT | Performed by: EMERGENCY MEDICINE

## 2024-03-24 PROCEDURE — 2500000002 HC RX 250 W HCPCS SELF ADMINISTERED DRUGS (ALT 637 FOR MEDICARE OP, ALT 636 FOR OP/ED): Performed by: EMERGENCY MEDICINE

## 2024-03-24 PROCEDURE — 71045 X-RAY EXAM CHEST 1 VIEW: CPT

## 2024-03-24 PROCEDURE — 2500000004 HC RX 250 GENERAL PHARMACY W/ HCPCS (ALT 636 FOR OP/ED): Performed by: EMERGENCY MEDICINE

## 2024-03-24 PROCEDURE — 36415 COLL VENOUS BLD VENIPUNCTURE: CPT | Performed by: EMERGENCY MEDICINE

## 2024-03-24 PROCEDURE — 83735 ASSAY OF MAGNESIUM: CPT | Performed by: EMERGENCY MEDICINE

## 2024-03-24 PROCEDURE — 84484 ASSAY OF TROPONIN QUANT: CPT | Performed by: EMERGENCY MEDICINE

## 2024-03-24 PROCEDURE — 83880 ASSAY OF NATRIURETIC PEPTIDE: CPT | Performed by: EMERGENCY MEDICINE

## 2024-03-24 PROCEDURE — 85610 PROTHROMBIN TIME: CPT | Performed by: EMERGENCY MEDICINE

## 2024-03-24 PROCEDURE — 96360 HYDRATION IV INFUSION INIT: CPT

## 2024-03-24 PROCEDURE — 85025 COMPLETE CBC W/AUTO DIFF WBC: CPT | Performed by: EMERGENCY MEDICINE

## 2024-03-24 PROCEDURE — 99283 EMERGENCY DEPT VISIT LOW MDM: CPT | Mod: 25

## 2024-03-24 PROCEDURE — 81003 URINALYSIS AUTO W/O SCOPE: CPT | Performed by: EMERGENCY MEDICINE

## 2024-03-24 PROCEDURE — 71045 X-RAY EXAM CHEST 1 VIEW: CPT | Performed by: RADIOLOGY

## 2024-03-24 RX ORDER — FAMOTIDINE 20 MG/1
20 TABLET, FILM COATED ORAL 2 TIMES DAILY
Qty: 30 TABLET | Refills: 0 | Status: SHIPPED | OUTPATIENT
Start: 2024-03-24 | End: 2024-04-08

## 2024-03-24 RX ORDER — LANOLIN ALCOHOL/MO/W.PET/CERES
400 CREAM (GRAM) TOPICAL ONCE
Status: COMPLETED | OUTPATIENT
Start: 2024-03-24 | End: 2024-03-24

## 2024-03-24 RX ORDER — POTASSIUM CHLORIDE 20 MEQ/1
40 TABLET, EXTENDED RELEASE ORAL ONCE
Status: COMPLETED | OUTPATIENT
Start: 2024-03-24 | End: 2024-03-24

## 2024-03-24 RX ADMIN — SODIUM CHLORIDE 1000 ML: 9 INJECTION, SOLUTION INTRAVENOUS at 16:48

## 2024-03-24 RX ADMIN — POTASSIUM CHLORIDE 40 MEQ: 1500 TABLET, EXTENDED RELEASE ORAL at 16:47

## 2024-03-24 RX ADMIN — MAGNESIUM OXIDE 400 MG (241.3 MG MAGNESIUM) TABLET 400 MG: TABLET at 16:47

## 2024-03-24 ASSESSMENT — COLUMBIA-SUICIDE SEVERITY RATING SCALE - C-SSRS
1. IN THE PAST MONTH, HAVE YOU WISHED YOU WERE DEAD OR WISHED YOU COULD GO TO SLEEP AND NOT WAKE UP?: NO
6. HAVE YOU EVER DONE ANYTHING, STARTED TO DO ANYTHING, OR PREPARED TO DO ANYTHING TO END YOUR LIFE?: NO
2. HAVE YOU ACTUALLY HAD ANY THOUGHTS OF KILLING YOURSELF?: NO

## 2024-03-24 ASSESSMENT — PAIN - FUNCTIONAL ASSESSMENT
PAIN_FUNCTIONAL_ASSESSMENT: 0-10
PAIN_FUNCTIONAL_ASSESSMENT: 0-10

## 2024-03-24 ASSESSMENT — PAIN SCALES - GENERAL
PAINLEVEL_OUTOF10: 0 - NO PAIN
PAINLEVEL_OUTOF10: 0 - NO PAIN

## 2024-03-24 NOTE — ED TRIAGE NOTES
Per squrai report pt was at [facility] urgent care today trying to get blood work done, but they do not do blood work on Sundays.  Pt then left and went to [facility] and returned to [facility] again.  Per squrai report [facility] was concerned about pt mental status and called [facility].  Pt came with [facility].  Pt states she had lab work scheduled for possible GI bleed.  Pt states she is homeless and decided to just come here. Pt couldn't clearly define a complaint other than the labwork she was supposed to get done for her doctor and asked for a dinner tray. Respirations even and unlabored.  No acute distress noted at this time.  Denies CP and SOB.  A&Ox4.  VSS.

## 2024-03-24 NOTE — DISCHARGE INSTRUCTIONS
Follow up with your primary care doctor and GI. Return to the ER if symptoms worsen or change. Take medications as prescribed.
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
## 2024-03-25 LAB
ATRIAL RATE: 65 BPM
HOLD SPECIMEN: NORMAL
P AXIS: 6 DEGREES
P OFFSET: 196 MS
P ONSET: 148 MS
PR INTERVAL: 148 MS
Q ONSET: 222 MS
QRS COUNT: 11 BEATS
QRS DURATION: 80 MS
QT INTERVAL: 412 MS
QTC CALCULATION(BAZETT): 428 MS
QTC FREDERICIA: 422 MS
R AXIS: 16 DEGREES
T AXIS: 73 DEGREES
T OFFSET: 428 MS
VENTRICULAR RATE: 65 BPM

## 2024-03-26 ENCOUNTER — HOSPITAL ENCOUNTER (EMERGENCY)
Age: 69
Discharge: PSYCHIATRIC OR SUBSTANCE USE FACILITY | End: 2024-03-26
Payer: MEDICARE

## 2024-03-26 VITALS
DIASTOLIC BLOOD PRESSURE: 65 MMHG | HEART RATE: 75 BPM | RESPIRATION RATE: 16 BRPM | SYSTOLIC BLOOD PRESSURE: 150 MMHG | BODY MASS INDEX: 35.34 KG/M2 | OXYGEN SATURATION: 100 % | WEIGHT: 207 LBS | TEMPERATURE: 98 F | HEIGHT: 64 IN

## 2024-03-26 DIAGNOSIS — F31.9 BIPOLAR 1 DISORDER (HCC): Primary | ICD-10-CM

## 2024-03-26 LAB
ALBUMIN SERPL-MCNC: 3.9 G/DL (ref 3.5–4.6)
ALP SERPL-CCNC: 87 U/L (ref 40–130)
ALT SERPL-CCNC: 8 U/L (ref 0–33)
AMPHET UR QL SCN: NORMAL
ANION GAP SERPL CALCULATED.3IONS-SCNC: 8 MEQ/L (ref 9–15)
APAP SERPL-MCNC: <5 UG/ML (ref 10–30)
AST SERPL-CCNC: 10 U/L (ref 0–35)
BARBITURATES UR QL SCN: NORMAL
BASOPHILS # BLD: 0 K/UL (ref 0–0.2)
BASOPHILS NFR BLD: 0.2 %
BENZODIAZ UR QL SCN: NORMAL
BILIRUB SERPL-MCNC: <0.2 MG/DL (ref 0.2–0.7)
BILIRUB UR QL STRIP: NEGATIVE
BUN SERPL-MCNC: 7 MG/DL (ref 8–23)
CALCIUM SERPL-MCNC: 9.3 MG/DL (ref 8.5–9.9)
CANNABINOIDS UR QL SCN: NORMAL
CHLORIDE SERPL-SCNC: 96 MEQ/L (ref 95–107)
CHOLEST SERPL-MCNC: 207 MG/DL (ref 0–199)
CK SERPL-CCNC: 34 U/L (ref 0–170)
CLARITY UR: CLEAR
CO2 SERPL-SCNC: 28 MEQ/L (ref 20–31)
COCAINE UR QL SCN: NORMAL
COLOR UR: YELLOW
CREAT SERPL-MCNC: 0.59 MG/DL (ref 0.5–0.9)
DRUG SCREEN COMMENT UR-IMP: NORMAL
EOSINOPHIL # BLD: 0.1 K/UL (ref 0–0.7)
EOSINOPHIL NFR BLD: 1.3 %
ERYTHROCYTE [DISTWIDTH] IN BLOOD BY AUTOMATED COUNT: 13.7 % (ref 11.5–14.5)
ETHANOL PERCENT: NORMAL G/DL
ETHANOLAMINE SERPL-MCNC: <10 MG/DL (ref 0–0.08)
FENTANYL SCREEN, URINE: NORMAL
GLOBULIN SER CALC-MCNC: 2.8 G/DL (ref 2.3–3.5)
GLUCOSE SERPL-MCNC: 101 MG/DL (ref 70–99)
GLUCOSE UR STRIP-MCNC: NEGATIVE MG/DL
HBA1C MFR BLD: 5.9 % (ref 4.8–5.9)
HCT VFR BLD AUTO: 32.5 % (ref 37–47)
HDLC SERPL-MCNC: 57 MG/DL (ref 40–59)
HGB BLD-MCNC: 10.3 G/DL (ref 12–16)
HGB UR QL STRIP: NEGATIVE
KETONES UR STRIP-MCNC: NEGATIVE MG/DL
LDLC SERPL CALC-MCNC: 129 MG/DL (ref 0–129)
LEUKOCYTE ESTERASE UR QL STRIP: NEGATIVE
LYMPHOCYTES # BLD: 2 K/UL (ref 1–4.8)
LYMPHOCYTES NFR BLD: 22.8 %
MCH RBC QN AUTO: 28.2 PG (ref 27–31.3)
MCHC RBC AUTO-ENTMCNC: 31.7 % (ref 33–37)
MCV RBC AUTO: 89 FL (ref 79.4–94.8)
METHADONE UR QL SCN: NORMAL
MONOCYTES # BLD: 0.8 K/UL (ref 0.2–0.8)
MONOCYTES NFR BLD: 9.7 %
NEUTROPHILS # BLD: 5.6 K/UL (ref 1.4–6.5)
NEUTS SEG NFR BLD: 65.8 %
NITRITE UR QL STRIP: NEGATIVE
OPIATES UR QL SCN: NORMAL
OXYCODONE UR QL SCN: NORMAL
PCP UR QL SCN: NORMAL
PH UR STRIP: 6 [PH] (ref 5–9)
PLATELET # BLD AUTO: 325 K/UL (ref 130–400)
POTASSIUM SERPL-SCNC: 3.7 MEQ/L (ref 3.4–4.9)
PROPOXYPH UR QL SCN: NORMAL
PROT SERPL-MCNC: 6.7 G/DL (ref 6.3–8)
PROT UR STRIP-MCNC: NEGATIVE MG/DL
RBC # BLD AUTO: 3.65 M/UL (ref 4.2–5.4)
SALICYLATES SERPL-MCNC: <0.3 MG/DL (ref 15–30)
SODIUM SERPL-SCNC: 132 MEQ/L (ref 135–144)
SP GR UR STRIP: 1.01 (ref 1–1.03)
TRIGL SERPL-MCNC: 105 MG/DL (ref 0–150)
TSH SERPL-MCNC: 1.69 UIU/ML (ref 0.44–3.86)
URINE REFLEX TO CULTURE: NORMAL
UROBILINOGEN UR STRIP-ACNC: 0.2 E.U./DL
WBC # BLD AUTO: 8.5 K/UL (ref 4.8–10.8)

## 2024-03-26 PROCEDURE — 80307 DRUG TEST PRSMV CHEM ANLYZR: CPT

## 2024-03-26 PROCEDURE — 80143 DRUG ASSAY ACETAMINOPHEN: CPT

## 2024-03-26 PROCEDURE — 80061 LIPID PANEL: CPT

## 2024-03-26 PROCEDURE — 80179 DRUG ASSAY SALICYLATE: CPT

## 2024-03-26 PROCEDURE — 81003 URINALYSIS AUTO W/O SCOPE: CPT

## 2024-03-26 PROCEDURE — 84443 ASSAY THYROID STIM HORMONE: CPT

## 2024-03-26 PROCEDURE — 99285 EMERGENCY DEPT VISIT HI MDM: CPT

## 2024-03-26 PROCEDURE — 6370000000 HC RX 637 (ALT 250 FOR IP): Performed by: PHYSICIAN ASSISTANT

## 2024-03-26 PROCEDURE — 85025 COMPLETE CBC W/AUTO DIFF WBC: CPT

## 2024-03-26 PROCEDURE — 82550 ASSAY OF CK (CPK): CPT

## 2024-03-26 PROCEDURE — 36415 COLL VENOUS BLD VENIPUNCTURE: CPT

## 2024-03-26 PROCEDURE — 83036 HEMOGLOBIN GLYCOSYLATED A1C: CPT

## 2024-03-26 PROCEDURE — 82077 ASSAY SPEC XCP UR&BREATH IA: CPT

## 2024-03-26 PROCEDURE — 80053 COMPREHEN METABOLIC PANEL: CPT

## 2024-03-26 RX ORDER — PRAVASTATIN SODIUM 40 MG
40 TABLET ORAL DAILY
COMMUNITY

## 2024-03-26 RX ORDER — TEMAZEPAM 15 MG/1
15 CAPSULE ORAL NIGHTLY PRN
COMMUNITY

## 2024-03-26 RX ORDER — LORATADINE 10 MG/1
10 CAPSULE, LIQUID FILLED ORAL DAILY
COMMUNITY

## 2024-03-26 RX ORDER — MULTIVIT WITH MINERALS/LUTEIN
250 TABLET ORAL 2 TIMES DAILY
COMMUNITY

## 2024-03-26 RX ORDER — LISINOPRIL 40 MG/1
40 TABLET ORAL DAILY
COMMUNITY

## 2024-03-26 RX ORDER — OXYBUTYNIN CHLORIDE 5 MG/1
5 TABLET ORAL 2 TIMES DAILY
COMMUNITY

## 2024-03-26 RX ORDER — NICOTINE 21 MG/24HR
1 PATCH, TRANSDERMAL 24 HOURS TRANSDERMAL ONCE
Status: DISCONTINUED | OUTPATIENT
Start: 2024-03-26 | End: 2024-03-26 | Stop reason: HOSPADM

## 2024-03-26 ASSESSMENT — LIFESTYLE VARIABLES
HOW OFTEN DO YOU HAVE A DRINK CONTAINING ALCOHOL: NEVER
HOW MANY STANDARD DRINKS CONTAINING ALCOHOL DO YOU HAVE ON A TYPICAL DAY: PATIENT DOES NOT DRINK

## 2024-03-26 NOTE — PROGRESS NOTES
Pt changed into psych safe clothes. Attempted to obtain urine but pt stated she could not get the lid off of the cup. Will reattempt later. Pt given snacks.

## 2024-03-26 NOTE — ED PROVIDER NOTES
[facility] ED  EMERGENCY DEPARTMENT ENCOUNTER      Pt Name: Ellie Cadena  MRN: [id number]  Birthdate 1955  Date of evaluation: 3/26/2024  Provider: Josh Kwok PA-C  2:07 PM EDT    CHIEF COMPLAINT       Chief Complaint   Patient presents with    Psychiatric Evaluation         HISTORY OF PRESENT ILLNESS   (Location/Symptom, Timing/Onset, Context/Setting, Quality, Duration, Modifying Factors, Severity)  Note limiting factors.   Ellie Cadena is a 68 y.o. female who presents to the emergency department for psych evaluation, patient was seen at the [facility] today where she states she is recently seen for her psychiatric issues, during her evaluation, she was pink slipped, and sent to the ED for further evaluation, staff stating that patient had made threats that she wanted to take a gun and shoot herself, she states that she did not want to be alive any longer.  Also she is currently homeless, living out of a motel.  Patient states she went to the [facility] hoping that they would give her a place to sleep, and eat this evening, she states she was tricked into coming to the ED stating that the statements the [facility] made on a pink slip are not true or valid.  She denies any homicidal or suicidal thoughts.  Past medical history significant for schizophrenia, bipolar, gastric reflux, aortic stenosis.    HPI    Nursing Notes were reviewed.    REVIEW OF SYSTEMS    (2-9 systems for level 4, 10 or more for level 5)     Review of Systems   Constitutional:  Negative for activity change and appetite change.   HENT:  Negative for congestion, ear discharge, ear pain, nosebleeds, rhinorrhea and sore throat.    Eyes:  Negative for discharge.   Respiratory:  Negative for shortness of breath.    Cardiovascular:  Negative for chest pain, palpitations and leg swelling.   Gastrointestinal:  Negative for abdominal distention, abdominal pain and constipation.   Genitourinary:  Negative for difficulty urinating
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
## (undated) DEVICE — Device: Brand: ENDO SMARTCAP

## (undated) DEVICE — CONMED SCOPE SAVER BITE BLOCK, 20X27 MM: Brand: SCOPE SAVER

## (undated) DEVICE — ENDO CARRY-ON PROCEDURE KIT: Brand: ENDO CARRY-ON PROCEDURE KIT

## (undated) DEVICE — GLOVE ORANGE PI 8 1/2   MSG9085

## (undated) DEVICE — TUBING, SUCTION, 1/4" X 10', STRAIGHT: Brand: MEDLINE

## (undated) DEVICE — TUBE SET 96 MM 64 MM H2O PERISTALTIC STD AUX CHANNEL

## (undated) DEVICE — ADAPTER FLSH PMP FLD MGMT GI IRRIG OFP 2 DISPOSABLE

## (undated) DEVICE — SINGLE PORT MANIFOLD: Brand: NEPTUNE 2

## (undated) DEVICE — BRUSH ENDO CLN L90.5IN SHTH DIA1.7MM BRIST DIA5-7MM 2-6MM

## (undated) DEVICE — GLOVE ORTHO 8   MSG9480

## (undated) DEVICE — FORCEPS BX L240CM JAW DIA2.4MM ORNG L CAP W/ NDL DISP RAD